# Patient Record
Sex: MALE | Race: WHITE | NOT HISPANIC OR LATINO | Employment: OTHER | URBAN - METROPOLITAN AREA
[De-identification: names, ages, dates, MRNs, and addresses within clinical notes are randomized per-mention and may not be internally consistent; named-entity substitution may affect disease eponyms.]

---

## 2017-03-15 ENCOUNTER — TELEPHONE (OUTPATIENT)
Dept: MEDICAL GROUP | Age: 82
End: 2017-03-15

## 2017-03-15 NOTE — TELEPHONE ENCOUNTER
Called Lui Denise and left a message to return my call regarding his/her upcoming NP appointment with Giuseppe Lazo M.D..   If patient returns the call please transfer them to extension: [phone]

## 2017-03-16 RX ORDER — ESCITALOPRAM OXALATE 20 MG/1
20 TABLET ORAL DAILY
COMMUNITY
End: 2017-03-22 | Stop reason: SDUPTHER

## 2017-03-16 NOTE — TELEPHONE ENCOUNTER
NEW PATIENT PRE-VISIT PLANNING    Called Lui Denise in order to verify health topics prior to the New appointment.     1.  All medications were updated? yes    2.  Allergies were updated? yes    3.  All care teams were updated? yes       •   Gait devices, O2, CPAP, etc: N\A        •   Eye professional: yes       •   Other specialists (GYN, cardiology, endo, etc): yes    4.  All pharmacies were updated? yes          Current Outpatient Prescriptions   Medication Sig Dispense Refill   • escitalopram (LEXAPRO) 20 MG tablet Take 20 mg by mouth every day.     • Donepezil HCl (ARICEPT PO) Take  by mouth.       No current facility-administered medications for this visit.       5.  Patient may be due for these Health Maintenance Topics (update any if possible):            Health Maintenance Due   Topic Date Due   • IMM DTaP/Tdap/Td Vaccine (1 - Tdap) 03/22/1955   • COLONOSCOPY  03/22/1986   • IMM ZOSTER VACCINE  03/22/1996   • IMM PNEUMOCOCCAL 65+ (ADULT) LOW/MEDIUM RISK SERIES (1 of 2 - PCV13) 03/22/2001   • IMM INFLUENZA (1) 09/01/2016                        6.  Immunizations were updated in [facility] using WebIZ?: No        a. Web Iz Recommendations: Not in Web Iz          7.  Former PCP records requested?: N\A         8.  Notes to provider or MA:       a. Establish care               Pt was encouraged to keep the appointment and to arrive at least 15-20 minutes early.

## 2017-03-22 ENCOUNTER — OFFICE VISIT (OUTPATIENT)
Dept: MEDICAL GROUP | Age: 82
End: 2017-03-22
Payer: MEDICARE

## 2017-03-22 VITALS
HEIGHT: 62 IN | WEIGHT: 143 LBS | RESPIRATION RATE: 16 BRPM | OXYGEN SATURATION: 98 % | SYSTOLIC BLOOD PRESSURE: 136 MMHG | DIASTOLIC BLOOD PRESSURE: 88 MMHG | TEMPERATURE: 97.9 F | HEART RATE: 66 BPM | BODY MASS INDEX: 26.31 KG/M2

## 2017-03-22 DIAGNOSIS — Z00.00 PREVENTATIVE HEALTH CARE: ICD-10-CM

## 2017-03-22 DIAGNOSIS — G30.1 LATE ONSET ALZHEIMER'S DISEASE WITH BEHAVIORAL DISTURBANCE (HCC): ICD-10-CM

## 2017-03-22 DIAGNOSIS — F32.9 REACTIVE DEPRESSION: ICD-10-CM

## 2017-03-22 DIAGNOSIS — Z23 NEED FOR VACCINATION: ICD-10-CM

## 2017-03-22 DIAGNOSIS — F02.818 LATE ONSET ALZHEIMER'S DISEASE WITH BEHAVIORAL DISTURBANCE (HCC): ICD-10-CM

## 2017-03-22 DIAGNOSIS — Z86.73 H/O: CVA (CEREBROVASCULAR ACCIDENT): ICD-10-CM

## 2017-03-22 DIAGNOSIS — K55.069: ICD-10-CM

## 2017-03-22 PROCEDURE — 99204 OFFICE O/P NEW MOD 45 MIN: CPT | Performed by: FAMILY MEDICINE

## 2017-03-22 RX ORDER — DONEPEZIL HYDROCHLORIDE 10 MG/1
10 TABLET, FILM COATED ORAL DAILY
Qty: 90 TAB | Refills: 1 | Status: SHIPPED | OUTPATIENT
Start: 2017-03-22 | End: 2018-01-15

## 2017-03-22 RX ORDER — ESCITALOPRAM OXALATE 20 MG/1
20 TABLET ORAL DAILY
Qty: 90 TAB | Refills: 1 | Status: SHIPPED | OUTPATIENT
Start: 2017-03-22 | End: 2017-09-23 | Stop reason: SDUPTHER

## 2017-03-22 RX ORDER — MEMANTINE HYDROCHLORIDE 10 MG/1
10 TABLET ORAL 2 TIMES DAILY
Qty: 180 TAB | Refills: 0 | Status: SHIPPED | OUTPATIENT
Start: 2017-03-22 | End: 2017-08-21 | Stop reason: SDUPTHER

## 2017-03-22 RX ORDER — DONEPEZIL HYDROCHLORIDE 10 MG/1
10 TABLET, FILM COATED ORAL NIGHTLY
COMMUNITY
End: 2017-03-22 | Stop reason: SDUPTHER

## 2017-03-22 RX ORDER — MELOXICAM 7.5 MG/1
7.5 TABLET ORAL DAILY
COMMUNITY
End: 2018-01-15

## 2017-03-22 RX ORDER — ASPIRIN 81 MG/1
81 TABLET, CHEWABLE ORAL DAILY
COMMUNITY
End: 2018-01-15

## 2017-03-22 RX ORDER — MEMANTINE HYDROCHLORIDE 10 MG/1
10 TABLET ORAL 2 TIMES DAILY
COMMUNITY
End: 2017-03-22 | Stop reason: SDUPTHER

## 2017-03-22 ASSESSMENT — PATIENT HEALTH QUESTIONNAIRE - PHQ9: CLINICAL INTERPRETATION OF PHQ2 SCORE: 1

## 2017-03-22 NOTE — ASSESSMENT & PLAN NOTE
On Namenda and Aricept ×6 years  This started in May 2011 after his bowel infarct per wife  Is often irritated about being asked what he wants to eat  He has no recolection of his unusual behavior  His wife is very supportive  Does not have a neurologist  They do not want to be seen by any specialists due to fear of co-pays

## 2017-03-22 NOTE — ASSESSMENT & PLAN NOTE
About 44 years ago--suffered a stroke per wife  At age 40, he was a heavy drinker at that point  They are not certain if that was the cause  No residual weakness, no speech difficulties

## 2017-03-22 NOTE — ASSESSMENT & PLAN NOTE
The patient is a very pleasant 82-year-old male who presents to clinic with his wife to establish care. He has a significant past medical history of CVA?, Depression, late onset Alzheimer's disease, bowel infarction? The patient denied any chest pain, no sob, no lazar, no  pnd, no orthopnea, no headache, no changes in vision, no numbness or tingling, no nausea, no diarrhea, no abdominal pain, no fevers, no chills, no bright red blood per rectum, no  difficulty urinating, no burning during micturition, no depressed mood, no other concerns.    He can walk with a cane  [place] [employer]  He can walk 5 blocks with some difficulty  He walks rarely , he is not active  His wife is his sole caretaker  His son lives in [place]  There are certain that they're up-to-date on all vaccinations

## 2017-03-22 NOTE — MR AVS SNAPSHOT
"        Lui Denise   3/22/2017 10:40 AM   Office Visit   MRN: [id number]    Department:  [address] [place] [address]   Dept Phone:  [phone]    Description:  Male : 1935   Provider:  Giuseppe Lazo M.D.           Allergies as of 3/22/2017     No Known Allergies      You were diagnosed with     Bowel infarction (HCC)   [705092]       Late onset Alzheimer's disease with behavioral disturbance   [8996385]       Need for vaccination   [620852]       Reactive depression   [621399]       H/O: CVA (cerebrovascular accident)   [981730]       Preventative health care   [310232]         Vital Signs     Blood Pressure Pulse Temperature Respirations Height Weight    136/88 mmHg 66 36.6 °C (97.9 °F) 16 1.575 m (5' 2.01\") 64.864 kg (143 lb)    Body Mass Index Oxygen Saturation Smoking Status             26.15 kg/m2 98% Former Smoker         Basic Information     Date Of Birth Sex Preferred Language          1935 Male English        Your appointments     Mar 22, 2017 10:40 AM   New Patient with Giuseppe Lazo M.D.   [address])    [address] [place] [address] [facility] [address]   [phone]           Please bring Photo ID, Insurance Cards, All Medication Bottles and copies of any legal documents (such as Living Will, Power of ) If speaking a language besides English please bring an adult . Please arrive 30 minutes prior for check in and registration. You will be receiving a confirmation call a few days before your appointment from our automated call confirmation system.            2017  1:40 PM   Established Patient with Giuseppe Lazo M.D.   [address])    [address] [place] [address] [facility] [address]   [phone]           You will be receiving a confirmation call a few days before your appointment from our automated call confirmation system.              Problem List              ICD-10-CM Priority Class " Noted - Resolved    Bowel infarction (HCC) K55.069   3/22/2017 - Present    Late onset Alzheimer's disease with behavioral disturbance G30.1, F02.81   3/22/2017 - Present    Need for vaccination Z23   3/22/2017 - Present    Reactive depression F32.9   3/22/2017 - Present    H/O: CVA (cerebrovascular accident) Z86.73   3/22/2017 - Present    Preventative health care Z00.00   3/22/2017 - Present      Health Maintenance        Date Due Completion Dates    IMM DTaP/Tdap/Td Vaccine (1 - Tdap) 3/22/1955 ---    COLONOSCOPY 3/22/1986 ---    IMM ZOSTER VACCINE 3/22/1996 ---    IMM PNEUMOCOCCAL 65+ (ADULT) LOW/MEDIUM RISK SERIES (1 of 2 - PCV13) 3/22/2001 ---    IMM INFLUENZA (1) 9/1/2016 ---            Current Immunizations     No immunizations on file.      Below and/or attached are the medications your provider expects you to take. Review all of your home medications and newly ordered medications with your provider and/or pharmacist. Follow medication instructions as directed by your provider and/or pharmacist. Please keep your medication list with you and share with your provider. Update the information when medications are discontinued, doses are changed, or new medications (including over-the-counter products) are added; and carry medication information at all times in the event of emergency situations     Allergies:  No Known Allergies          Medications  Valid as of: March 22, 2017 -  9:57 AM    Generic Name Brand Name Tablet Size Instructions for use    Aspirin (Chew Tab) ASA 81 MG Take 81 mg by mouth every day.        Donepezil HCl   Take  by mouth.        Donepezil HCl (Tab) ARICEPT 10 MG Take 1 Tab by mouth every day.        Escitalopram Oxalate (Tab) LEXAPRO 20 MG Take 1 Tab by mouth every day.        Meloxicam (Tab) MOBIC 7.5 MG Take 7.5 mg by mouth every day.        Memantine HCl (Tab) NAMENDA 10 MG Take 1 Tab by mouth 2 times a day.        .                 Medicines prescribed today were sent to:     [id number] - [place], [place] - [address]    [address] [place] [address]    Phone: [phone] Fax: [phone]    Open 24 Hours?: No      Medication refill instructions:       If your prescription bottle indicates you have medication refills left, it is not necessary to call your provider’s office. Please contact your pharmacy and they will refill your medication.    If your prescription bottle indicates you do not have any refills left, you may request refills at any time through one of the following ways: The online Comeks system (except Urgent Care), by calling your provider’s office, or by asking your pharmacy to contact your provider’s office with a refill request. Medication refills are processed only during regular business hours and may not be available until the next business day. Your provider may request additional information or to have a follow-up visit with you prior to refilling your medication.   *Please Note: Medication refills are assigned a new Rx number when refilled electronically. Your pharmacy may indicate that no refills were authorized even though a new prescription for the same medication is available at the pharmacy. Please request the medicine by name with the pharmacy before contacting your provider for a refill.        Your To Do List     Future Labs/Procedures Complete By Expires    CBC WITH DIFFERENTIAL  As directed 3/22/2018    COMP METABOLIC PANEL  As directed 3/22/2018    LIPID PROFILE  As directed 3/22/2018         Comeks Status: Patient Declined

## 2017-03-22 NOTE — PROGRESS NOTES
This medical record contains text that has been entered with the assistance of computer voice recognition and dictation software.  Therefore, it may contain unintended errors in text, spelling, punctuation, or grammar    No chief complaint on file.      Lui Denise is a 82 y.o. male here evaluation and management of: Establish care, also has dementia, depression      HPI:     Bowel infarction (HCC)  May 29, 2011  Was in ICU for 2wks  S/p colon resection  Now eating in normal fashion  And has BM x1 daily    Late onset Alzheimer's disease with behavioral disturbance  On Namenda and Aricept ×6 years  This started in May 2011 after his bowel infarct per wife  Is often irritated about being asked what he wants to eat  He has no recolection of his unusual behavior  His wife is very supportive  Does not have a neurologist  They do not want to be seen by any specialists due to fear of co-pays    Reactive depression  Lexapro 20 mg daily Was started to help with the agitation from dementia. Wife states that she thinks it may be helping she is not certain.    H/O: CVA (cerebrovascular accident)  About 44 years ago--suffered a stroke per wife  At age 40, he was a heavy drinker at that point  They are not certain if that was the cause  No residual weakness, no speech difficulties    Need for vaccination  They do not believe in vaccinations    Preventative health care  The patient is a very pleasant 82-year-old male who presents to clinic with his wife to establish care. He has a significant past medical history of CVA?, Depression, late onset Alzheimer's disease, bowel infarction? The patient denied any chest pain, no sob, no lazar, no  pnd, no orthopnea, no headache, no changes in vision, no numbness or tingling, no nausea, no diarrhea, no abdominal pain, no fevers, no chills, no bright red blood per rectum, no  difficulty urinating, no burning during micturition, no depressed mood, no other concerns.    He can walk with a  cane  [place]  He can walk 5 blocks with some difficulty  He walks rarely , he is not active  His wife is his sole caretaker  His son lives in [place]  There are certain that they're up-to-date on all vaccinations        Current medicines (including changes today)  Current Outpatient Prescriptions   Medication Sig Dispense Refill   • meloxicam (MOBIC) 7.5 MG Tab Take 7.5 mg by mouth every day.     • aspirin (ASA) 81 MG Chew Tab chewable tablet Take 81 mg by mouth every day.     • escitalopram (LEXAPRO) 20 MG tablet Take 1 Tab by mouth every day. 90 Tab 1   • memantine (NAMENDA) 10 MG Tab Take 1 Tab by mouth 2 times a day. 180 Tab 0   • donepezil (ARICEPT) 10 MG tablet Take 1 Tab by mouth every day. 90 Tab 1   • Donepezil HCl (ARICEPT PO) Take  by mouth.       No current facility-administered medications for this visit.     He  has a past medical history of Stroke (CMS-[facility]) and Alzheimer's dementia.  He  has past surgical history that includes hernia repair.  Social History   Substance Use Topics   • Smoking status: Former Smoker -- 3.00 packs/day for 48 years     Types: Cigarettes     Quit date: 1996   • Smokeless tobacco: Former User     Types: Chew     Quit date: 1968   • Alcohol Use: No      Comment: recoverying alcoholic  1973     Social History     Social History Narrative     Family History   Problem Relation Age of Onset   • No Known Problems Mother    • No Known Problems Father    • No Known Problems Maternal Grandmother    • No Known Problems Maternal Grandfather    • No Known Problems Paternal Grandmother    • No Known Problems Paternal Grandfather      Family Status   Relation Status Death Age   • Mother     • Father     • Sister Alive    • Maternal Grandmother     • Maternal Grandfather     • Paternal Grandmother     • Paternal Grandfather           ROS  Please see history of present illness    All other  "systems reviewed and are negative     Objective:     Blood pressure 136/88, pulse 66, temperature 36.6 °C (97.9 °F), resp. rate 16, height 1.575 m (5' 2.01\"), weight 64.864 kg (143 lb), SpO2 98 %. Body mass index is 26.15 kg/(m^2).  Physical Exam:    Constitutional: Alert, no distress, walks with cane very slowly  Skin: Warm, dry, good turgor, no rashes in visible areas.  Eye: Equal, round and reactive, conjunctiva clear, lids normal.  ENMT: Lips without lesions, good dentition, oropharynx clear.  Neck: Trachea midline, no masses, no thyromegaly. No cervical or supraclavicular lymphadenopathy.  Respiratory: Unlabored respiratory effort, lungs clear to auscultation, no wheezes, no ronchi.  Cardiovascular: Normal S1, S2, no murmur, no edema.  Abdomen: Soft, non-tender, no masses, no hepatosplenomegaly.  Psych: Alert and oriented x3, normal affect and mood.          Assessment and Plan:   The following treatment plan was discussed, again this medical record contains text that has been entered with the assistance of computer voice recognition and dictation software.  Therefore, it may contain unintended errors in text, spelling, punctuation, or grammar      1. Bowel infarction (HCC)  Requested that records be sent to us    2. Late onset Alzheimer's disease with behavioral disturbance  I will refill his medications  They do not want to be seen by any other specialist  Because of fear of co-pays  - memantine (NAMENDA) 10 MG Tab; Take 1 Tab by mouth 2 times a day.  Dispense: 180 Tab; Refill: 0  - donepezil (ARICEPT) 10 MG tablet; Take 1 Tab by mouth every day.  Dispense: 90 Tab; Refill: 1    3. Need for vaccination  There are certain that they're up-to-date on all vaccinations  I requested that they have records sent here    4. Reactive depression  Patient has been stable with current management  We will make no changes for now  - escitalopram (LEXAPRO) 20 MG tablet; Take 1 Tab by mouth every day.  Dispense: 90 Tab; " Refill: 1    5. H/O: CVA (cerebrovascular accident)  Continue blood pressure control and daily prophylactic aspirin    6. Preventative health care  Care has been established  We need baseline labs to establish a clinical profile  Continue daily prophylactic aspirin    the benefits outweighs  risk of GI bleed in his case  We reviewed [place] preventative guidelines  Up to date on colonoscopy per wife  Requested Medical records to be sent to us  This patient is due for nothing per wife    - COMP METABOLIC PANEL; Future  - CBC WITH DIFFERENTIAL; Future  - LIPID PROFILE; Future        Followup: Return in about 3 months (around 6/22/2017) for Reevaluation, shave biopsy.

## 2017-03-22 NOTE — ASSESSMENT & PLAN NOTE
Lexapro 20 mg daily Was started to help with the agitation from dementia. Wife states that she thinks it may be helping she is not certain.

## 2017-03-22 NOTE — ASSESSMENT & PLAN NOTE
May 29, 2011  Was in ICU for 2wks  S/p colon resection  Now eating in normal fashion  And has BM x1 daily

## 2017-04-19 ENCOUNTER — HOSPITAL ENCOUNTER (OUTPATIENT)
Dept: LAB | Facility: MEDICAL CENTER | Age: 82
End: 2017-04-19
Attending: FAMILY MEDICINE
Payer: MEDICARE

## 2017-04-19 DIAGNOSIS — Z00.00 PREVENTATIVE HEALTH CARE: ICD-10-CM

## 2017-04-19 LAB
BASOPHILS # BLD AUTO: 1.1 % (ref 0–1.8)
BASOPHILS # BLD: 0.08 K/UL (ref 0–0.12)
EOSINOPHIL # BLD AUTO: 0.22 K/UL (ref 0–0.51)
EOSINOPHIL NFR BLD: 2.9 % (ref 0–6.9)
ERYTHROCYTE [DISTWIDTH] IN BLOOD BY AUTOMATED COUNT: 48.4 FL (ref 35.9–50)
HCT VFR BLD AUTO: 47.9 % (ref 42–52)
HGB BLD-MCNC: 15.5 G/DL (ref 14–18)
IMM GRANULOCYTES # BLD AUTO: 0.07 K/UL (ref 0–0.11)
IMM GRANULOCYTES NFR BLD AUTO: 0.9 % (ref 0–0.9)
LYMPHOCYTES # BLD AUTO: 2.21 K/UL (ref 1–4.8)
LYMPHOCYTES NFR BLD: 29.2 % (ref 22–41)
MCH RBC QN AUTO: 32.4 PG (ref 27–33)
MCHC RBC AUTO-ENTMCNC: 32.4 G/DL (ref 33.7–35.3)
MCV RBC AUTO: 100.2 FL (ref 81.4–97.8)
MONOCYTES # BLD AUTO: 0.44 K/UL (ref 0–0.85)
MONOCYTES NFR BLD AUTO: 5.8 % (ref 0–13.4)
NEUTROPHILS # BLD AUTO: 4.56 K/UL (ref 1.82–7.42)
NEUTROPHILS NFR BLD: 60.1 % (ref 44–72)
NRBC # BLD AUTO: 0 K/UL
NRBC BLD AUTO-RTO: 0 /100 WBC
PLATELET # BLD AUTO: 213 K/UL (ref 164–446)
PMV BLD AUTO: 11 FL (ref 9–12.9)
RBC # BLD AUTO: 4.78 M/UL (ref 4.7–6.1)
WBC # BLD AUTO: 7.6 K/UL (ref 4.8–10.8)

## 2017-04-19 PROCEDURE — 80061 LIPID PANEL: CPT

## 2017-04-19 PROCEDURE — 80053 COMPREHEN METABOLIC PANEL: CPT

## 2017-04-19 PROCEDURE — 85025 COMPLETE CBC W/AUTO DIFF WBC: CPT

## 2017-04-19 PROCEDURE — 36415 COLL VENOUS BLD VENIPUNCTURE: CPT

## 2017-04-20 LAB
ALBUMIN SERPL BCP-MCNC: 4.2 G/DL (ref 3.2–4.9)
ALBUMIN/GLOB SERPL: 1.5 G/DL
ALP SERPL-CCNC: 70 U/L (ref 30–99)
ALT SERPL-CCNC: 11 U/L (ref 2–50)
ANION GAP SERPL CALC-SCNC: 8 MMOL/L (ref 0–11.9)
AST SERPL-CCNC: 14 U/L (ref 12–45)
BILIRUB SERPL-MCNC: 1.5 MG/DL (ref 0.1–1.5)
BUN SERPL-MCNC: 24 MG/DL (ref 8–22)
CALCIUM SERPL-MCNC: 9.2 MG/DL (ref 8.5–10.5)
CHLORIDE SERPL-SCNC: 107 MMOL/L (ref 96–112)
CHOLEST SERPL-MCNC: 150 MG/DL (ref 100–199)
CO2 SERPL-SCNC: 25 MMOL/L (ref 20–33)
CREAT SERPL-MCNC: 1.07 MG/DL (ref 0.5–1.4)
GFR SERPL CREATININE-BSD FRML MDRD: >60 ML/MIN/1.73 M 2
GLOBULIN SER CALC-MCNC: 2.8 G/DL (ref 1.9–3.5)
GLUCOSE SERPL-MCNC: 84 MG/DL (ref 65–99)
HDLC SERPL-MCNC: 39 MG/DL
LDLC SERPL CALC-MCNC: 94 MG/DL
POTASSIUM SERPL-SCNC: 3.7 MMOL/L (ref 3.6–5.5)
PROT SERPL-MCNC: 7 G/DL (ref 6–8.2)
SODIUM SERPL-SCNC: 140 MMOL/L (ref 135–145)
TRIGL SERPL-MCNC: 86 MG/DL (ref 0–149)

## 2017-04-21 ENCOUNTER — TELEPHONE (OUTPATIENT)
Dept: MEDICAL GROUP | Age: 82
End: 2017-04-21

## 2017-04-21 NOTE — TELEPHONE ENCOUNTER
----- Message from Giuseppe Lazo M.D. sent at 4/20/2017  3:16 PM PDT -----  All of the patients labs were normal. They may see some that are highlighted, however these have no clinical significance.    Mando Lazo MD  [facility]  [address] [place] [id number]

## 2017-04-21 NOTE — TELEPHONE ENCOUNTER
Phone Number Called: [phone]    Message: Spoke with pt's wife, informed her of results of lab work.    Left Message for patient to call back: N\A

## 2017-06-22 ENCOUNTER — HOSPITAL ENCOUNTER (OUTPATIENT)
Facility: MEDICAL CENTER | Age: 82
End: 2017-06-22
Attending: FAMILY MEDICINE
Payer: MEDICARE

## 2017-06-22 ENCOUNTER — OFFICE VISIT (OUTPATIENT)
Dept: MEDICAL GROUP | Age: 82
End: 2017-06-22
Payer: MEDICARE

## 2017-06-22 VITALS
TEMPERATURE: 97.5 F | HEART RATE: 105 BPM | BODY MASS INDEX: 25.65 KG/M2 | WEIGHT: 139.4 LBS | OXYGEN SATURATION: 98 % | HEIGHT: 62 IN | DIASTOLIC BLOOD PRESSURE: 62 MMHG | SYSTOLIC BLOOD PRESSURE: 110 MMHG

## 2017-06-22 DIAGNOSIS — D48.9 NEOPLASM OF UNCERTAIN BEHAVIOR: ICD-10-CM

## 2017-06-22 DIAGNOSIS — Z23 NEED FOR VACCINATION: ICD-10-CM

## 2017-06-22 DIAGNOSIS — L57.0 AK (ACTINIC KERATOSIS): ICD-10-CM

## 2017-06-22 PROCEDURE — 90472 IMMUNIZATION ADMIN EACH ADD: CPT | Performed by: FAMILY MEDICINE

## 2017-06-22 PROCEDURE — 17000 DESTRUCT PREMALG LESION: CPT | Performed by: FAMILY MEDICINE

## 2017-06-22 PROCEDURE — 11100 PR BIOPSY OF SKIN LESION: CPT | Mod: 59 | Performed by: FAMILY MEDICINE

## 2017-06-22 PROCEDURE — 17003 DESTRUCT PREMALG LES 2-14: CPT | Performed by: FAMILY MEDICINE

## 2017-06-22 PROCEDURE — G0009 ADMIN PNEUMOCOCCAL VACCINE: HCPCS | Mod: 59 | Performed by: FAMILY MEDICINE

## 2017-06-22 PROCEDURE — 90670 PCV13 VACCINE IM: CPT | Performed by: FAMILY MEDICINE

## 2017-06-22 PROCEDURE — 88305 TISSUE EXAM BY PATHOLOGIST: CPT

## 2017-06-22 PROCEDURE — 90715 TDAP VACCINE 7 YRS/> IM: CPT | Performed by: FAMILY MEDICINE

## 2017-06-22 NOTE — ASSESSMENT & PLAN NOTE
The patient is an 82-year-old male who presents to clinic with his wife and his daughter with a chief complaint of mole on the left upper back. They state that is been there for over 20 years but may have grown recently. He has several moles all over his body they state and need to be seen by dermatologist.

## 2017-06-22 NOTE — PROGRESS NOTES
This medical record contains text that has been entered with the assistance of computer voice recognition and dictation software.  Therefore, it may contain unintended errors in text, spelling, punctuation, or grammar    Chief Complaint   Patient presents with   • Other     see reason for visit       Lui Denise is a 82 y.o. male here evaluation and management of: Moles, AK      HPI:     Neoplasm of uncertain behavior  The patient is an 82-year-old male who presents to clinic with his wife and his daughter with a chief complaint of mole on the left upper back. They state that is been there for over 20 years but may have grown recently. He has several moles all over his body they state and need to be seen by dermatologist.    AK (actinic keratosis)  Patient also noted some angiopathic, red pinpoint, dry spots on his back. These are new.      Current medicines (including changes today)  Current Outpatient Prescriptions   Medication Sig Dispense Refill   • escitalopram (LEXAPRO) 20 MG tablet Take 1 Tab by mouth every day. 90 Tab 1   • memantine (NAMENDA) 10 MG Tab Take 1 Tab by mouth 2 times a day. 180 Tab 0   • donepezil (ARICEPT) 10 MG tablet Take 1 Tab by mouth every day. 90 Tab 1   • Donepezil HCl (ARICEPT PO) Take  by mouth.     • meloxicam (MOBIC) 7.5 MG Tab Take 7.5 mg by mouth every day.     • aspirin (ASA) 81 MG Chew Tab chewable tablet Take 81 mg by mouth every day.       No current facility-administered medications for this visit.     He  has a past medical history of Stroke (CMS-[facility]) and Alzheimer's dementia.  He  has past surgical history that includes hernia repair.  Social History   Substance Use Topics   • Smoking status: Former Smoker -- 3.00 packs/day for 48 years     Types: Cigarettes     Quit date: 03/19/1996   • Smokeless tobacco: Former User     Types: Chew     Quit date: 03/19/1968   • Alcohol Use: No      Comment: recoverying alcoholic  july 6, 1973     Social History     Social History  "Narrative     Family History   Problem Relation Age of Onset   • No Known Problems Mother    • No Known Problems Father    • No Known Problems Maternal Grandmother    • No Known Problems Maternal Grandfather    • No Known Problems Paternal Grandmother    • No Known Problems Paternal Grandfather      Family Status   Relation Status Death Age   • Mother     • Father     • Sister Alive    • Maternal Grandmother     • Maternal Grandfather     • Paternal Grandmother     • Paternal Grandfather           ROS  Please see hpi    All other systems reviewed and are negative     Objective:     Blood pressure 110/62, pulse 105, temperature 36.4 °C (97.5 °F), height 1.575 m (5' 2.01\"), weight 63.231 kg (139 lb 6.4 oz), SpO2 98 %. Body mass index is 25.49 kg/(m^2).  Physical Exam:    Constitutional: Alert, no distress.  Skin: Warm, dry, good turgor, no rashes in visible areas.  2  lesions on left upper back evidence of of solar damage present , spotty scattered telangiectasias, and  Xerosis      PROCEDURE: CRYOTHERAPY  Discussed risks and benefits of cryotherapy. Patient verbally agreed. 3 applications of cryotherapy were applied to the back lesions. Patient tolerated procedure well. Aftercare instructions given.      Eye: Equal, round and reactive, conjunctiva clear, lids normal.  ENMT: Lips without lesions, good dentition, oropharynx clear.  Neck: Trachea midline, no masses, no thyromegaly. No cervical or supraclavicular lymphadenopathy.  Respiratory: Unlabored respiratory effort, lungs clear to auscultation, no wheezes, no ronchi.  Cardiovascular: Normal S1, S2, no murmur, no edema.  Abdomen: Soft, non-tender, no masses, no hepatosplenomegaly.  Psych: Alert and oriented x3, normal affect and mood.    PROCEDURE NOTE, SHAVE BIOPSY    Indication: pain/bleeding over skin growth      History of allergy to iodine: NO     The risks (including bleeding and infection) and benefits of the " procedure and written informed consent obtained.    Local anesthesia was performed with Lidocaine 1% without epinephrine, prepped with povidone iodine, and draped in the usual sterile fashion.  Using a dermablade i shaved beneath the leasion and removed.     The wound stopped bleeding and a sterile dressing applied.  The specimen was  sent for pathologic examination.    The patient tolerated the procedure well and without apparent complications.    The patient was instructed to keep the wound dry and covered for 24-48h and clean thereafter, and warning signs of infection were reviewed.    Recommended that the patient use OTC analgesics as needed for pain.  Followup sooner for any concerns.            Assessment and Plan:   The following treatment plan was discussed    1. Neoplasm of uncertain behavior  Patient tollerrated procedure well  There were no adverse events  Patient was given post procedure precautions     - PATHOLOGY SPECIMEN; Future  - REFERRAL TO DERMATOLOGY    2. Need for vaccination  Given today    - Pneumococcal Conjugate Vaccine 13-Valent <4yo IM  - TDAP VACCINE =>6YO IM      3. AK (actinic keratosis)  Patient tollerrated procedure well  There were no adverse events  Patient was given post procedure precautions         HEALTH MAINTENANCE: due for AWV and shingles    Instructed to Follow up in clinic or ER for worsening symptoms, difficulty breathing, lack of expected recovery, or should new symptoms or problems arise.    Followup: Return in about 3 months (around 9/22/2017) for Reevaluation.       Once again this medical record contains text that has been entered with the assistance of computer voice recognition and dictation software.  Therefore, it may contain unintended errors in text, spelling, punctuation, or grammar

## 2017-06-23 LAB — PATHOLOGY CONSULT NOTE: NORMAL

## 2017-06-27 DIAGNOSIS — D48.9 NEOPLASM OF UNCERTAIN BEHAVIOR: ICD-10-CM

## 2017-08-22 RX ORDER — MEMANTINE HYDROCHLORIDE 10 MG/1
TABLET ORAL
Qty: 180 TAB | Refills: 0 | Status: SHIPPED | OUTPATIENT
Start: 2017-08-22 | End: 2017-11-21 | Stop reason: SDUPTHER

## 2017-09-23 DIAGNOSIS — F32.9 REACTIVE DEPRESSION: ICD-10-CM

## 2017-09-26 RX ORDER — ESCITALOPRAM OXALATE 20 MG/1
TABLET ORAL
Qty: 90 TAB | Refills: 0 | Status: SHIPPED | OUTPATIENT
Start: 2017-09-26 | End: 2017-12-20 | Stop reason: SDUPTHER

## 2017-11-28 RX ORDER — MEMANTINE HYDROCHLORIDE 10 MG/1
TABLET ORAL
Qty: 180 TAB | Refills: 0 | Status: ON HOLD | OUTPATIENT
Start: 2017-11-28 | End: 2018-02-13

## 2017-12-20 DIAGNOSIS — F32.9 REACTIVE DEPRESSION: ICD-10-CM

## 2017-12-26 RX ORDER — ESCITALOPRAM OXALATE 20 MG/1
TABLET ORAL
Qty: 90 TAB | Refills: 0 | Status: SHIPPED | OUTPATIENT
Start: 2017-12-26 | End: 2018-02-27

## 2017-12-26 RX ORDER — DONEPEZIL HYDROCHLORIDE 10 MG/1
10 TABLET, FILM COATED ORAL EVERY 24 HOURS
Qty: 90 TAB | Refills: 0 | Status: SHIPPED | OUTPATIENT
Start: 2017-12-26 | End: 2018-02-27

## 2017-12-27 ENCOUNTER — TELEPHONE (OUTPATIENT)
Dept: MEDICAL GROUP | Age: 82
End: 2017-12-27

## 2017-12-27 NOTE — TELEPHONE ENCOUNTER
ESTABLISHED PATIENT PRE-VISIT PLANNING     Note: Patient will not be contacted if there is no indication to call.     1.  Reviewed notes from the last few office visits within the medical group: Yes    2.  If any orders were placed at last visit or intended to be done for this visit (i.e. 6 mos follow-up), do we have Results/Consult Notes?        •  Labs - Labs were not ordered at last office visit.   Note: If patient appointment is for lab review and patient did not complete labs, check with provider if OK to reschedule patient until labs completed.       •  Imaging - Imaging was not ordered at last office visit.       •  Referrals - No referrals were ordered at last office visit.    3. Is this appointment scheduled as a Hospital Follow-Up? No    4.  Immunizations were updated in Epic using WebIZ?: Epic matches WebIZ       •  Web Iz Recommendations: FLU and ZOSTAVAX (Shingles)    5.  Patient is due for the following Health Maintenance Topics:   Health Maintenance Due   Topic Date Due   • Annual Wellness Visit  1935   • IMM ZOSTER VACCINE  03/22/1996   • IMM INFLUENZA (1) 09/01/2017       - Patient is up-to-date on all Health Maintenance topics. No records have been requested at this time.    6.  Patient was NOT informed to arrive 15 min prior to their scheduled appointment and bring in their medication bottles.

## 2017-12-28 ENCOUNTER — OFFICE VISIT (OUTPATIENT)
Dept: MEDICAL GROUP | Age: 82
End: 2017-12-28
Payer: MEDICARE

## 2017-12-28 VITALS
OXYGEN SATURATION: 98 % | WEIGHT: 138 LBS | BODY MASS INDEX: 25.4 KG/M2 | HEART RATE: 84 BPM | DIASTOLIC BLOOD PRESSURE: 70 MMHG | SYSTOLIC BLOOD PRESSURE: 124 MMHG | TEMPERATURE: 97.7 F | HEIGHT: 62 IN

## 2017-12-28 DIAGNOSIS — F32.9 REACTIVE DEPRESSION: ICD-10-CM

## 2017-12-28 DIAGNOSIS — H25.012 CORTICAL AGE-RELATED CATARACT OF LEFT EYE: ICD-10-CM

## 2017-12-28 DIAGNOSIS — Z23 NEED FOR VACCINATION: ICD-10-CM

## 2017-12-28 DIAGNOSIS — F02.818 LATE ONSET ALZHEIMER'S DISEASE WITH BEHAVIORAL DISTURBANCE (HCC): ICD-10-CM

## 2017-12-28 DIAGNOSIS — J30.1 CHRONIC SEASONAL ALLERGIC RHINITIS DUE TO POLLEN: ICD-10-CM

## 2017-12-28 DIAGNOSIS — G30.1 LATE ONSET ALZHEIMER'S DISEASE WITH BEHAVIORAL DISTURBANCE (HCC): ICD-10-CM

## 2017-12-28 DIAGNOSIS — R06.83 SNORING: ICD-10-CM

## 2017-12-28 DIAGNOSIS — L57.0 AK (ACTINIC KERATOSIS): ICD-10-CM

## 2017-12-28 PROBLEM — H25.9 AGE-RELATED CATARACT OF LEFT EYE: Status: ACTIVE | Noted: 2017-12-28

## 2017-12-28 PROCEDURE — 17003 DESTRUCT PREMALG LES 2-14: CPT | Performed by: FAMILY MEDICINE

## 2017-12-28 PROCEDURE — 99214 OFFICE O/P EST MOD 30 MIN: CPT | Mod: 25 | Performed by: FAMILY MEDICINE

## 2017-12-28 PROCEDURE — 17000 DESTRUCT PREMALG LESION: CPT | Performed by: FAMILY MEDICINE

## 2017-12-28 RX ORDER — MONTELUKAST SODIUM 10 MG/1
10 TABLET ORAL DAILY
Qty: 90 TAB | Refills: 1 | Status: SHIPPED | OUTPATIENT
Start: 2017-12-28 | End: 2018-02-27

## 2017-12-28 RX ORDER — ALPRAZOLAM 0.25 MG/1
0.25 TABLET ORAL NIGHTLY PRN
Qty: 30 TAB | Refills: 0 | Status: SHIPPED | OUTPATIENT
Start: 2017-12-28 | End: 2018-01-27

## 2017-12-28 NOTE — ASSESSMENT & PLAN NOTE
Lexapro 20mg    She has not noticed any improvement, he continues to have bouts of anxiety nervousness when he becomes really irritable and frustrated. This is due to him not remembering routine things throughout his day.

## 2017-12-28 NOTE — PROGRESS NOTES
This medical record contains text that has been entered with the assistance of computer voice recognition and dictation software.  Therefore, it may contain unintended errors in text, spelling, punctuation, or grammar    Chief Complaint   Patient presents with   • Other     see reason for visit       Lui Denise is a 82 y.o. male here evaluation and management of: Alzheimers, depression, AK, cataract      HPI:     Late onset Alzheimer's disease with behavioral disturbance  On Namenda and Aricept x 7 years  They believe his memory is improving  He remembers his wife or murmurs his daughter  He continues to have some behavioral disturbances when he becomes very irritable, they think he may benefit from Xanax.      Reactive depression  Lexapro 20mg    She has not noticed any improvement, he continues to have bouts of anxiety nervousness when he becomes really irritable and frustrated. This is due to him not remembering routine things throughout his day.    AK (actinic keratosis)  The patient continues to scratch these very irritating lesions on the top of his head. They are new and he does not use sunscreen nor wears hat he is bald.    Age-related cataract of left eye  Patient has difficulty seeing due to age-related cataract, however there financial hardship prevents him from going to see the ophthalmologist for now. They planned going next year.    Current medicines (including changes today)  Current Outpatient Prescriptions   Medication Sig Dispense Refill   • montelukast (SINGULAIR) 10 MG Tab Take 1 Tab by mouth every day. 90 Tab 1   • alprazolam (XANAX) 0.25 MG Tab Take 1 Tab by mouth at bedtime as needed for Sleep for up to 30 days. 30 Tab 0   • escitalopram (LEXAPRO) 20 MG tablet TAKE ONE TABLET BY MOUTH ONE TIME DAILY 90 Tab 0   • memantine (NAMENDA) 10 MG Tab TAKE 1 TABLET BY MOUTH TWICE DAILY 180 Tab 0   • donepezil (ARICEPT) 10 MG tablet Take 1 Tab by mouth every day. 90 Tab 1   • donepezil (ARICEPT) 10 MG  "tablet Take 1 Tab by mouth every 24 hours. 90 Tab 0   • meloxicam (MOBIC) 7.5 MG Tab Take 7.5 mg by mouth every day.     • aspirin (ASA) 81 MG Chew Tab chewable tablet Take 81 mg by mouth every day.       No current facility-administered medications for this visit.      He  has a past medical history of Alzheimer's dementia and Stroke (CMS-HCC).  He  has a past surgical history that includes hernia repair.  Social History   Substance Use Topics   • Smoking status: Former Smoker     Packs/day: 3.00     Years: 48.00     Types: Cigarettes     Quit date: 3/19/1996   • Smokeless tobacco: Former User     Types: Chew     Quit date: 3/19/1968   • Alcohol use No      Comment: recoverying alcoholic  1973     Social History     Social History Narrative   • No narrative on file     Family History   Problem Relation Age of Onset   • No Known Problems Mother    • No Known Problems Father    • No Known Problems Maternal Grandmother    • No Known Problems Maternal Grandfather    • No Known Problems Paternal Grandmother    • No Known Problems Paternal Grandfather      Family Status   Relation Status   • Mother    • Father    • Sister Alive   • Maternal Grandmother    • Maternal Grandfather    • Paternal Grandmother    • Paternal Grandfather          ROS    Please see hpi     All other systems reviewed and are negative     Objective:     Blood pressure 124/70, pulse 84, temperature 36.5 °C (97.7 °F), height 1.575 m (5' 2.01\"), weight 62.6 kg (138 lb), SpO2 98 %. Body mass index is 25.23 kg/m².  Physical Exam:    Constitutional: Alert, no distress.  Skin: Warm, dry, good turgor, no rashes in visible areas.  Eye: Equal, round and reactive, conjunctiva clear, lids normal.  ENMT: Lips without lesions, good dentition, oropharynx clear.  Neck: Trachea midline, no masses, no thyromegaly. No cervical or supraclavicular lymphadenopathy.  Respiratory: Unlabored respiratory effort, lungs " clear to auscultation, no wheezes, no ronchi.  Cardiovascular: Normal S1, S2, no murmur, no edema.  Abdomen: Soft, non-tender, no masses, no hepatosplenomegaly.  Psych: Alert and oriented x3, normal affect and mood.      SKIN EXAM        scalp with evidence of of solar damage present , spotty hyperpigmentation, scattered telangiectasias, and  Xerosis      PROCEDURE: CRYOTHERAPY  Discussed risks and benefits of cryotherapy including but not limited to scarring, hyperpigmentation, hypopigmentation, hypertrophic scarring, keiloid scarring, incomplete or no resolution of lesions treated,pain, undesirable cosemetic result, blistering, potential need for additional treatment including more invasive treatment. Patient expresses understanding and verbally acknowledges risks and consent to treatment. 2  applications of cryotherapy with 3 second freeze thaw cycle was applied to  3 AK's. Patient tolerated procedure well. There were no complications. Aftercare instructions given.            Assessment and Plan:   The following treatment plan was discussed      1. Need for vaccination    - INFLUENZA VACCINE, HIGH DOSE (65+ ONLY)    2. Cortical age-related cataract of left eye  Needs to f/u with ophathamology    3. Late onset Alzheimer's disease with behavioral disturbance  They would like to try Xanax prn severe agitation, not daily    - alprazolam (XANAX) 0.25 MG Tab; Take 1 Tab by mouth at bedtime as needed for Sleep for up to 30 days.  Dispense: 30 Tab; Refill: 0    4. Reactive depression      5. Chronic seasonal allergic rhinitis due to pollen    - montelukast (SINGULAIR) 10 MG Tab; Take 1 Tab by mouth every day.  Dispense: 90 Tab; Refill: 1    6. Snoring  Clinically suspicious for obstructive sleep apnea  [place] Sleepiness Scale score (ESS) of 10  We will proceed with  Polysomnography    We discussed all pathologies related to untreated sleep apnea  Including but not limited to pulmonary hypertension, stroke,  cardiovascular disease, difficulty losing weight, headaches, daytime somnolence, etc...    - REFERRAL TO PULMONOLOGY    7. AK (actinic keratosis)  Patient tollerrated procedure well  There were no adverse events  Patient was given post procedure precautions           HEALTH MAINTENANCE:    Instructed to Follow up in clinic or ER for worsening symptoms, difficulty breathing, lack of expected recovery, or should new symptoms or problems arise.    Followup: Return in about 3 months (around 3/28/2018) for Reevaluation.       Once again this medical record contains text that has been entered with the assistance of computer voice recognition and dictation software.  Therefore, it may contain unintended errors in text, spelling, punctuation, or grammar

## 2017-12-28 NOTE — ASSESSMENT & PLAN NOTE
On Namenda and Aricept x 7 years  They believe his memory is improving  He remembers his wife or murmurs his daughter  He continues to have some behavioral disturbances when he becomes very irritable, they think he may benefit from Xanax.

## 2017-12-28 NOTE — ASSESSMENT & PLAN NOTE
The patient continues to scratch these very irritating lesions on the top of his head. They are new and he does not use sunscreen nor wears hat he is bald.

## 2018-01-15 ENCOUNTER — APPOINTMENT (OUTPATIENT)
Dept: RADIOLOGY | Facility: MEDICAL CENTER | Age: 83
DRG: 057 | End: 2018-01-15
Attending: EMERGENCY MEDICINE
Payer: MEDICARE

## 2018-01-15 ENCOUNTER — RESOLUTE PROFESSIONAL BILLING HOSPITAL PROF FEE (OUTPATIENT)
Dept: HOSPITALIST | Facility: MEDICAL CENTER | Age: 83
End: 2018-01-15
Payer: MEDICARE

## 2018-01-15 ENCOUNTER — HOSPITAL ENCOUNTER (INPATIENT)
Facility: MEDICAL CENTER | Age: 83
LOS: 29 days | DRG: 057 | End: 2018-02-13
Attending: EMERGENCY MEDICINE | Admitting: HOSPITALIST
Payer: MEDICARE

## 2018-01-15 DIAGNOSIS — N30.00 ACUTE CYSTITIS WITHOUT HEMATURIA: ICD-10-CM

## 2018-01-15 DIAGNOSIS — E87.0 HYPERNATREMIA: ICD-10-CM

## 2018-01-15 DIAGNOSIS — G30.1 LATE ONSET ALZHEIMER'S DISEASE WITH BEHAVIORAL DISTURBANCE (HCC): Primary | ICD-10-CM

## 2018-01-15 DIAGNOSIS — R62.7 FTT (FAILURE TO THRIVE) IN ADULT: ICD-10-CM

## 2018-01-15 DIAGNOSIS — R45.1 AGITATION: ICD-10-CM

## 2018-01-15 DIAGNOSIS — F02.818 LATE ONSET ALZHEIMER'S DISEASE WITH BEHAVIORAL DISTURBANCE (HCC): Primary | ICD-10-CM

## 2018-01-15 PROBLEM — R45.851 SUICIDAL IDEATION: Status: ACTIVE | Noted: 2018-01-15

## 2018-01-15 PROBLEM — R82.71 ASYMPTOMATIC BACTERIURIA: Status: ACTIVE | Noted: 2018-01-15

## 2018-01-15 PROBLEM — F03.90 DEMENTIA (HCC): Status: ACTIVE | Noted: 2018-01-15

## 2018-01-15 PROBLEM — R45.4 IRRITABILITY: Status: ACTIVE | Noted: 2018-01-15

## 2018-01-15 LAB
ALBUMIN SERPL BCP-MCNC: 4.1 G/DL (ref 3.2–4.9)
ALBUMIN/GLOB SERPL: 1.9 G/DL
ALP SERPL-CCNC: 53 U/L (ref 30–99)
ALT SERPL-CCNC: 7 U/L (ref 2–50)
AMPHET UR QL SCN: NEGATIVE
ANION GAP SERPL CALC-SCNC: 10 MMOL/L (ref 0–11.9)
APPEARANCE UR: CLEAR
AST SERPL-CCNC: 11 U/L (ref 12–45)
BACTERIA #/AREA URNS HPF: NEGATIVE /HPF
BARBITURATES UR QL SCN: NEGATIVE
BASOPHILS # BLD AUTO: 0.7 % (ref 0–1.8)
BASOPHILS # BLD: 0.05 K/UL (ref 0–0.12)
BENZODIAZ UR QL SCN: POSITIVE
BILIRUB SERPL-MCNC: 0.9 MG/DL (ref 0.1–1.5)
BILIRUB UR QL STRIP.AUTO: NEGATIVE
BUN SERPL-MCNC: 20 MG/DL (ref 8–22)
BZE UR QL SCN: NEGATIVE
CALCIUM SERPL-MCNC: 9 MG/DL (ref 8.5–10.5)
CANNABINOIDS UR QL SCN: NEGATIVE
CAOX CRY #/AREA URNS HPF: ABNORMAL /HPF
CHLORIDE SERPL-SCNC: 115 MMOL/L (ref 96–112)
CO2 SERPL-SCNC: 25 MMOL/L (ref 20–33)
COLOR UR: YELLOW
CREAT SERPL-MCNC: 0.9 MG/DL (ref 0.5–1.4)
CULTURE IF INDICATED INDCX: YES UA CULTURE
EOSINOPHIL # BLD AUTO: 0.26 K/UL (ref 0–0.51)
EOSINOPHIL NFR BLD: 3.6 % (ref 0–6.9)
EPI CELLS #/AREA URNS HPF: ABNORMAL /HPF
ERYTHROCYTE [DISTWIDTH] IN BLOOD BY AUTOMATED COUNT: 49.1 FL (ref 35.9–50)
ETHANOL BLD-MCNC: 0 G/DL
GLOBULIN SER CALC-MCNC: 2.2 G/DL (ref 1.9–3.5)
GLUCOSE SERPL-MCNC: 92 MG/DL (ref 65–99)
GLUCOSE UR STRIP.AUTO-MCNC: NEGATIVE MG/DL
HCT VFR BLD AUTO: 42.6 % (ref 42–52)
HGB BLD-MCNC: 14.2 G/DL (ref 14–18)
HYALINE CASTS #/AREA URNS LPF: ABNORMAL /LPF
IMM GRANULOCYTES # BLD AUTO: 0.05 K/UL (ref 0–0.11)
IMM GRANULOCYTES NFR BLD AUTO: 0.7 % (ref 0–0.9)
KETONES UR STRIP.AUTO-MCNC: ABNORMAL MG/DL
LEUKOCYTE ESTERASE UR QL STRIP.AUTO: ABNORMAL
LYMPHOCYTES # BLD AUTO: 1.58 K/UL (ref 1–4.8)
LYMPHOCYTES NFR BLD: 22 % (ref 22–41)
MCH RBC QN AUTO: 33.5 PG (ref 27–33)
MCHC RBC AUTO-ENTMCNC: 33.3 G/DL (ref 33.7–35.3)
MCV RBC AUTO: 100.5 FL (ref 81.4–97.8)
METHADONE UR QL SCN: NEGATIVE
MICRO URNS: ABNORMAL
MONOCYTES # BLD AUTO: 0.31 K/UL (ref 0–0.85)
MONOCYTES NFR BLD AUTO: 4.3 % (ref 0–13.4)
MUCOUS THREADS #/AREA URNS HPF: ABNORMAL /HPF
NEUTROPHILS # BLD AUTO: 4.93 K/UL (ref 1.82–7.42)
NEUTROPHILS NFR BLD: 68.7 % (ref 44–72)
NITRITE UR QL STRIP.AUTO: NEGATIVE
NRBC # BLD AUTO: 0 K/UL
NRBC BLD-RTO: 0 /100 WBC
OPIATES UR QL SCN: NEGATIVE
OXYCODONE UR QL SCN: NEGATIVE
PCP UR QL SCN: NEGATIVE
PH UR STRIP.AUTO: 5 [PH]
PLATELET # BLD AUTO: 171 K/UL (ref 164–446)
PMV BLD AUTO: 10.8 FL (ref 9–12.9)
POTASSIUM SERPL-SCNC: 4.4 MMOL/L (ref 3.6–5.5)
PROPOXYPH UR QL SCN: NEGATIVE
PROT SERPL-MCNC: 6.3 G/DL (ref 6–8.2)
PROT UR QL STRIP: NEGATIVE MG/DL
RBC # BLD AUTO: 4.24 M/UL (ref 4.7–6.1)
RBC UR QL AUTO: NEGATIVE
RENAL EPI CELLS #/AREA URNS HPF: ABNORMAL /HPF
SODIUM SERPL-SCNC: 150 MMOL/L (ref 135–145)
SP GR UR STRIP.AUTO: 1.03
UROBILINOGEN UR STRIP.AUTO-MCNC: 1 MG/DL
WBC # BLD AUTO: 7.2 K/UL (ref 4.8–10.8)
WBC #/AREA URNS HPF: ABNORMAL /HPF

## 2018-01-15 PROCEDURE — 96374 THER/PROPH/DIAG INJ IV PUSH: CPT

## 2018-01-15 PROCEDURE — 700102 HCHG RX REV CODE 250 W/ 637 OVERRIDE(OP): Performed by: HOSPITALIST

## 2018-01-15 PROCEDURE — 87086 URINE CULTURE/COLONY COUNT: CPT

## 2018-01-15 PROCEDURE — 99223 1ST HOSP IP/OBS HIGH 75: CPT | Performed by: HOSPITALIST

## 2018-01-15 PROCEDURE — 85025 COMPLETE CBC W/AUTO DIFF WBC: CPT

## 2018-01-15 PROCEDURE — A9270 NON-COVERED ITEM OR SERVICE: HCPCS | Performed by: HOSPITALIST

## 2018-01-15 PROCEDURE — 80053 COMPREHEN METABOLIC PANEL: CPT

## 2018-01-15 PROCEDURE — 770006 HCHG ROOM/CARE - MED/SURG/GYN SEMI*

## 2018-01-15 PROCEDURE — 71045 X-RAY EXAM CHEST 1 VIEW: CPT

## 2018-01-15 PROCEDURE — 87040 BLOOD CULTURE FOR BACTERIA: CPT

## 2018-01-15 PROCEDURE — 700111 HCHG RX REV CODE 636 W/ 250 OVERRIDE (IP): Performed by: EMERGENCY MEDICINE

## 2018-01-15 PROCEDURE — 81001 URINALYSIS AUTO W/SCOPE: CPT

## 2018-01-15 PROCEDURE — 99285 EMERGENCY DEPT VISIT HI MDM: CPT

## 2018-01-15 PROCEDURE — 80307 DRUG TEST PRSMV CHEM ANLYZR: CPT

## 2018-01-15 RX ORDER — ASPIRIN 81 MG/1
81 TABLET, CHEWABLE ORAL DAILY
Status: DISCONTINUED | OUTPATIENT
Start: 2018-01-15 | End: 2018-01-15

## 2018-01-15 RX ORDER — DONEPEZIL HYDROCHLORIDE 5 MG/1
10 TABLET, FILM COATED ORAL EVERY 24 HOURS
Status: DISCONTINUED | OUTPATIENT
Start: 2018-01-15 | End: 2018-01-17

## 2018-01-15 RX ORDER — BISACODYL 10 MG
10 SUPPOSITORY, RECTAL RECTAL
Status: DISCONTINUED | OUTPATIENT
Start: 2018-01-15 | End: 2018-01-19

## 2018-01-15 RX ORDER — SODIUM CHLORIDE 450 MG/100ML
INJECTION, SOLUTION INTRAVENOUS CONTINUOUS
Status: DISCONTINUED | OUTPATIENT
Start: 2018-01-15 | End: 2018-01-16

## 2018-01-15 RX ORDER — ACETAMINOPHEN 325 MG/1
650 TABLET ORAL EVERY 6 HOURS PRN
Status: DISCONTINUED | OUTPATIENT
Start: 2018-01-15 | End: 2018-02-13 | Stop reason: HOSPADM

## 2018-01-15 RX ORDER — ALPRAZOLAM 0.25 MG/1
0.25 TABLET ORAL NIGHTLY PRN
Status: DISCONTINUED | OUTPATIENT
Start: 2018-01-15 | End: 2018-01-17

## 2018-01-15 RX ORDER — MEMANTINE HYDROCHLORIDE 10 MG/1
10 TABLET ORAL 2 TIMES DAILY
Status: DISCONTINUED | OUTPATIENT
Start: 2018-01-15 | End: 2018-02-13 | Stop reason: HOSPADM

## 2018-01-15 RX ORDER — AMOXICILLIN 250 MG
2 CAPSULE ORAL 2 TIMES DAILY
Status: DISCONTINUED | OUTPATIENT
Start: 2018-01-15 | End: 2018-01-19

## 2018-01-15 RX ORDER — POLYETHYLENE GLYCOL 3350 17 G/17G
1 POWDER, FOR SOLUTION ORAL
Status: DISCONTINUED | OUTPATIENT
Start: 2018-01-15 | End: 2018-01-19

## 2018-01-15 RX ORDER — MONTELUKAST SODIUM 10 MG/1
10 TABLET ORAL DAILY
Status: DISCONTINUED | OUTPATIENT
Start: 2018-01-16 | End: 2018-02-13 | Stop reason: HOSPADM

## 2018-01-15 RX ORDER — HEPARIN SODIUM 5000 [USP'U]/ML
5000 INJECTION, SOLUTION INTRAVENOUS; SUBCUTANEOUS EVERY 8 HOURS
Status: DISCONTINUED | OUTPATIENT
Start: 2018-01-15 | End: 2018-01-18

## 2018-01-15 RX ORDER — ONDANSETRON 4 MG/1
4 TABLET, ORALLY DISINTEGRATING ORAL EVERY 4 HOURS PRN
Status: DISCONTINUED | OUTPATIENT
Start: 2018-01-15 | End: 2018-02-13 | Stop reason: HOSPADM

## 2018-01-15 RX ORDER — CEFTRIAXONE 1 G/1
1 INJECTION, POWDER, FOR SOLUTION INTRAMUSCULAR; INTRAVENOUS ONCE
Status: COMPLETED | OUTPATIENT
Start: 2018-01-15 | End: 2018-01-15

## 2018-01-15 RX ORDER — ESCITALOPRAM OXALATE 10 MG/1
20 TABLET ORAL DAILY
Status: DISCONTINUED | OUTPATIENT
Start: 2018-01-16 | End: 2018-02-13 | Stop reason: HOSPADM

## 2018-01-15 RX ORDER — ONDANSETRON 2 MG/ML
4 INJECTION INTRAMUSCULAR; INTRAVENOUS EVERY 4 HOURS PRN
Status: DISCONTINUED | OUTPATIENT
Start: 2018-01-15 | End: 2018-01-18

## 2018-01-15 RX ADMIN — CEFTRIAXONE SODIUM 1 G: 1 INJECTION, POWDER, FOR SOLUTION INTRAMUSCULAR; INTRAVENOUS at 19:43

## 2018-01-15 RX ADMIN — MEMANTINE HYDROCHLORIDE 10 MG: 10 TABLET ORAL at 23:34

## 2018-01-15 RX ADMIN — DONEPEZIL HYDROCHLORIDE 10 MG: 5 TABLET, FILM COATED ORAL at 23:34

## 2018-01-15 RX ADMIN — STANDARDIZED SENNA CONCENTRATE AND DOCUSATE SODIUM 2 TABLET: 8.6; 5 TABLET, FILM COATED ORAL at 23:34

## 2018-01-15 ASSESSMENT — PAIN SCALES - GENERAL: PAINLEVEL_OUTOF10: 0

## 2018-01-16 PROBLEM — F02.80 ALZHEIMER'S DISEASE (HCC): Status: ACTIVE | Noted: 2018-01-15

## 2018-01-16 PROBLEM — R82.90 ABNORMAL URINALYSIS: Status: ACTIVE | Noted: 2018-01-16

## 2018-01-16 PROBLEM — G30.9 ALZHEIMER'S DISEASE (HCC): Status: ACTIVE | Noted: 2018-01-15

## 2018-01-16 LAB
ALBUMIN SERPL BCP-MCNC: 3.5 G/DL (ref 3.2–4.9)
BASOPHILS # BLD AUTO: 0.7 % (ref 0–1.8)
BASOPHILS # BLD: 0.04 K/UL (ref 0–0.12)
BUN SERPL-MCNC: 17 MG/DL (ref 8–22)
CALCIUM SERPL-MCNC: 8.2 MG/DL (ref 8.5–10.5)
CHLORIDE SERPL-SCNC: 104 MMOL/L (ref 96–112)
CO2 SERPL-SCNC: 25 MMOL/L (ref 20–33)
CREAT SERPL-MCNC: 0.83 MG/DL (ref 0.5–1.4)
EOSINOPHIL # BLD AUTO: 0.39 K/UL (ref 0–0.51)
EOSINOPHIL NFR BLD: 6.9 % (ref 0–6.9)
ERYTHROCYTE [DISTWIDTH] IN BLOOD BY AUTOMATED COUNT: 48 FL (ref 35.9–50)
GLUCOSE SERPL-MCNC: 75 MG/DL (ref 65–99)
HCT VFR BLD AUTO: 38.6 % (ref 42–52)
HGB BLD-MCNC: 12.9 G/DL (ref 14–18)
IMM GRANULOCYTES # BLD AUTO: 0.05 K/UL (ref 0–0.11)
IMM GRANULOCYTES NFR BLD AUTO: 0.9 % (ref 0–0.9)
LYMPHOCYTES # BLD AUTO: 1.66 K/UL (ref 1–4.8)
LYMPHOCYTES NFR BLD: 29.2 % (ref 22–41)
MCH RBC QN AUTO: 33.4 PG (ref 27–33)
MCHC RBC AUTO-ENTMCNC: 33.4 G/DL (ref 33.7–35.3)
MCV RBC AUTO: 100 FL (ref 81.4–97.8)
MONOCYTES # BLD AUTO: 0.31 K/UL (ref 0–0.85)
MONOCYTES NFR BLD AUTO: 5.5 % (ref 0–13.4)
NEUTROPHILS # BLD AUTO: 3.23 K/UL (ref 1.82–7.42)
NEUTROPHILS NFR BLD: 56.8 % (ref 44–72)
NRBC # BLD AUTO: 0 K/UL
NRBC BLD-RTO: 0 /100 WBC
PHOSPHATE SERPL-MCNC: 2.5 MG/DL (ref 2.5–4.5)
PLATELET # BLD AUTO: 149 K/UL (ref 164–446)
PMV BLD AUTO: 10.8 FL (ref 9–12.9)
POTASSIUM SERPL-SCNC: 3.7 MMOL/L (ref 3.6–5.5)
RBC # BLD AUTO: 3.86 M/UL (ref 4.7–6.1)
SODIUM SERPL-SCNC: 135 MMOL/L (ref 135–145)
WBC # BLD AUTO: 5.7 K/UL (ref 4.8–10.8)

## 2018-01-16 PROCEDURE — 700102 HCHG RX REV CODE 250 W/ 637 OVERRIDE(OP): Performed by: HOSPITALIST

## 2018-01-16 PROCEDURE — A9270 NON-COVERED ITEM OR SERVICE: HCPCS | Performed by: HOSPITALIST

## 2018-01-16 PROCEDURE — 36415 COLL VENOUS BLD VENIPUNCTURE: CPT

## 2018-01-16 PROCEDURE — 80069 RENAL FUNCTION PANEL: CPT

## 2018-01-16 PROCEDURE — 85025 COMPLETE CBC W/AUTO DIFF WBC: CPT

## 2018-01-16 PROCEDURE — 99232 SBSQ HOSP IP/OBS MODERATE 35: CPT | Performed by: INTERNAL MEDICINE

## 2018-01-16 PROCEDURE — 770001 HCHG ROOM/CARE - MED/SURG/GYN PRIV*

## 2018-01-16 PROCEDURE — 700105 HCHG RX REV CODE 258: Performed by: HOSPITALIST

## 2018-01-16 PROCEDURE — 700111 HCHG RX REV CODE 636 W/ 250 OVERRIDE (IP): Performed by: HOSPITALIST

## 2018-01-16 RX ADMIN — SODIUM CHLORIDE: 4.5 INJECTION, SOLUTION INTRAVENOUS at 00:22

## 2018-01-16 RX ADMIN — STANDARDIZED SENNA CONCENTRATE AND DOCUSATE SODIUM 2 TABLET: 8.6; 5 TABLET, FILM COATED ORAL at 07:52

## 2018-01-16 RX ADMIN — SODIUM CHLORIDE: 4.5 INJECTION, SOLUTION INTRAVENOUS at 07:55

## 2018-01-16 RX ADMIN — HEPARIN SODIUM 5000 UNITS: 5000 INJECTION, SOLUTION INTRAVENOUS; SUBCUTANEOUS at 14:27

## 2018-01-16 RX ADMIN — ESCITALOPRAM OXALATE 20 MG: 10 TABLET ORAL at 07:52

## 2018-01-16 RX ADMIN — MONTELUKAST SODIUM 10 MG: 10 TABLET, COATED ORAL at 07:52

## 2018-01-16 RX ADMIN — HEPARIN SODIUM 5000 UNITS: 5000 INJECTION, SOLUTION INTRAVENOUS; SUBCUTANEOUS at 22:00

## 2018-01-16 RX ADMIN — MEMANTINE HYDROCHLORIDE 10 MG: 10 TABLET ORAL at 19:55

## 2018-01-16 RX ADMIN — MEMANTINE HYDROCHLORIDE 10 MG: 10 TABLET ORAL at 07:52

## 2018-01-16 RX ADMIN — DONEPEZIL HYDROCHLORIDE 10 MG: 5 TABLET, FILM COATED ORAL at 18:28

## 2018-01-16 RX ADMIN — STANDARDIZED SENNA CONCENTRATE AND DOCUSATE SODIUM 2 TABLET: 8.6; 5 TABLET, FILM COATED ORAL at 19:55

## 2018-01-16 ASSESSMENT — PAIN SCALES - GENERAL
PAINLEVEL_OUTOF10: 0

## 2018-01-16 ASSESSMENT — ENCOUNTER SYMPTOMS
FEVER: 0
VOMITING: 0
COUGH: 0
CHILLS: 0
LOSS OF CONSCIOUSNESS: 1
WHEEZING: 0
SHORTNESS OF BREATH: 0
NAUSEA: 0
CONSTIPATION: 0
HEADACHES: 0
DIARRHEA: 0

## 2018-01-16 NOTE — ED PROVIDER NOTES
"ED Provider Note    Scribed for Phillip Boykin M.D. by Aisha Prasad. 1/15/2018  5:14 PM    Primary Care Provider: Mando Chin M.D.  Means of arrival: Ambulance   History limited by: None     CHIEF COMPLAINT  Chief Complaint   Patient presents with   • Psych Eval     HPI  Lui Denise is a 82 y.o. male who presents to the ED by ambulance for psychiatric evaluation. Per wife, the patient has been living with her and she has been the patient's caretaker. She states he has been \"too much for her to take care of\". Per nursing, the patient's family is requesting home placement secondary to the patient's dementia. The patient had reportedly overheard his wife talking to resources on the phone regarding home placement and he made a suicidal statement. Patient's step daughter reports the patient said he was \"going to do it today\". Family denies any guns in the hold. Dr. Lazo is the patient's primary care provider. Patient does not drink alcohol and has been sober since the 1970s. Patient denies shortness of breath, nausea, vomiting, new skin rashes or newfound weakness or numbness to extremities.     REVIEW OF SYSTEMS    EYES:  Denies blurred vision.   ENT:  Denies sore throat.  CARDIOVASCULAR:  Denies chest pain.  RESPIRATORY:  Denies shortness of breath.  GI:  Denies nausea and vomiting.  MUSCULOSKELETAL:  Denies weakness.  SKIN:  No rash.  NEUROLOGIC: Denies headache.  PSYCHIATRIC: Positive for suicidal ideation.   All other systems have been reviewed and are negative.   C.     PAST MEDICAL HISTORY  Past Medical History:   Diagnosis Date   • Alzheimer's dementia    • Stroke (CMS-[facility])     intestial blockage, memory       FAMILY HISTORY  Family History   Problem Relation Age of Onset   • No Known Problems Mother    • No Known Problems Father    • No Known Problems Maternal Grandmother    • No Known Problems Maternal Grandfather    • No Known Problems Paternal Grandmother    • No Known Problems " Paternal Grandfather        SOCIAL HISTORY   reports that he quit smoking about 21 years ago. His smoking use included Cigarettes. He has a 144.00 pack-year smoking history. He quit smokeless tobacco use about 49 years ago. His smokeless tobacco use included Chew. He reports that he does not drink alcohol or use drugs.    SURGICAL HISTORY  Past Surgical History:   Procedure Laterality Date   • HERNIA REPAIR      reconstruction 2011       CURRENT MEDICATIONS  No current facility-administered medications on file prior to encounter.      Current Outpatient Prescriptions on File Prior to Encounter   Medication Sig Dispense Refill   • montelukast (SINGULAIR) 10 MG Tab Take 1 Tab by mouth every day. 90 Tab 1   • alprazolam (XANAX) 0.25 MG Tab Take 1 Tab by mouth at bedtime as needed for Sleep for up to 30 days. 30 Tab 0   • escitalopram (LEXAPRO) 20 MG tablet TAKE ONE TABLET BY MOUTH ONE TIME DAILY 90 Tab 0   • donepezil (ARICEPT) 10 MG tablet Take 1 Tab by mouth every 24 hours. 90 Tab 0   • memantine (NAMENDA) 10 MG Tab TAKE 1 TABLET BY MOUTH TWICE DAILY 180 Tab 0   • meloxicam (MOBIC) 7.5 MG Tab Take 7.5 mg by mouth every day.     • aspirin (ASA) 81 MG Chew Tab chewable tablet Take 81 mg by mouth every day.     • donepezil (ARICEPT) 10 MG tablet Take 1 Tab by mouth every day. 90 Tab 1        ALLERGIES  No Known Allergies    PHYSICAL EXAM  VITAL SIGNS: /81   Pulse 65   Temp 37.1 °C (98.7 °F)   Resp 15   Wt 63 kg (138 lb 14.2 oz)   SpO2 95%   BMI 25.40 kg/m²      Constitutional: Patient is awake and alert. Obviously confused No acute respiratory distress. Well developed, Well nourished, Non-toxic appearance.  HENT: Normocephalic, Atraumatic, Bilateral external ears normal, Oropharynx pink moist with no exudates, Nose patent.  Eyes: PERRLA, EOMI, Sclera and conjunctiva clear, No discharge.   Neck: Midline. Supple no nuchal rigidity, no thyromegaly or mass. Non-tender  Lymphatic: No supraclavicular lymph nodes.    Cardiovascular: Heart is regular rate and rhythm no murmur, rub or thrill.   Thorax & Lungs: Chest is symmetrical, with good breath sounds. No wheezing or crackles. No respiratory distress, No chest tenderness.   Abdomen: Soft, No tenderness no hepatosplenomegaly there is no guarding or rebound, No masses, No pulsatile masses.   Skin: Warm, Dry, no petechia, purpura, or rash.   Back: Non tender with palpation, No CVA tenderness.   Extremities: No edema. Non tender.   Musculoskeletal: Good range of motion to wrists, elbows, shoulders, hips, knees, and ankles. Pulses 2+ radially and femorally. No gross deformities noted.   Neurologic: Alert & does not know year or month or where he is at. Strength is 5 over 5 and symmetric in bilateral upper and lower extremities.  Sensory is intact to light touch to face, arms, and legs.  DTRs are symmetrical in biceps brachioradialis, patella and Achilles.   Psychiatric: Agitated confused         LABS  Results for orders placed or performed during the hospital encounter of 01/15/18   CBC WITH DIFFERENTIAL   Result Value Ref Range    WBC 7.2 4.8 - 10.8 K/uL    RBC 4.24 (L) 4.70 - 6.10 M/uL    Hemoglobin 14.2 14.0 - 18.0 g/dL    Hematocrit 42.6 42.0 - 52.0 %    .5 (H) 81.4 - 97.8 fL    MCH 33.5 (H) 27.0 - 33.0 pg    MCHC 33.3 (L) 33.7 - 35.3 g/dL    RDW 49.1 35.9 - 50.0 fL    Platelet Count 171 164 - 446 K/uL    MPV 10.8 9.0 - 12.9 fL    Neutrophils-Polys 68.70 44.00 - 72.00 %    Lymphocytes 22.00 22.00 - 41.00 %    Monocytes 4.30 0.00 - 13.40 %    Eosinophils 3.60 0.00 - 6.90 %    Basophils 0.70 0.00 - 1.80 %    Immature Granulocytes 0.70 0.00 - 0.90 %    Nucleated RBC 0.00 /100 WBC    Neutrophils (Absolute) 4.93 1.82 - 7.42 K/uL    Lymphs (Absolute) 1.58 1.00 - 4.80 K/uL    Monos (Absolute) 0.31 0.00 - 0.85 K/uL    Eos (Absolute) 0.26 0.00 - 0.51 K/uL    Baso (Absolute) 0.05 0.00 - 0.12 K/uL    Immature Granulocytes (abs) 0.05 0.00 - 0.11 K/uL    NRBC (Absolute) 0.00 K/uL    COMP METABOLIC PANEL   Result Value Ref Range    Sodium 150 (H) 135 - 145 mmol/L    Potassium 4.4 3.6 - 5.5 mmol/L    Chloride 115 (H) 96 - 112 mmol/L    Co2 25 20 - 33 mmol/L    Anion Gap 10.0 0.0 - 11.9    Glucose 92 65 - 99 mg/dL    Bun 20 8 - 22 mg/dL    Creatinine 0.90 0.50 - 1.40 mg/dL    Calcium 9.0 8.5 - 10.5 mg/dL    AST(SGOT) 11 (L) 12 - 45 U/L    ALT(SGPT) 7 2 - 50 U/L    Alkaline Phosphatase 53 30 - 99 U/L    Total Bilirubin 0.9 0.1 - 1.5 mg/dL    Albumin 4.1 3.2 - 4.9 g/dL    Total Protein 6.3 6.0 - 8.2 g/dL    Globulin 2.2 1.9 - 3.5 g/dL    A-G Ratio 1.9 g/dL   URINE DRUG SCREEN (TRIAGE)   Result Value Ref Range    Amphetamines Urine Negative Negative    Barbiturates Negative Negative    Benzodiazepines Positive (A) Negative    Cocaine Metabolite Negative Negative    Methadone Negative Negative    Opiates Negative Negative    Oxycodone Negative Negative    Phencyclidine -Pcp Negative Negative    Propoxyphene Negative Negative    Cannabinoid Metab Negative Negative   URINALYSIS CULTURE, IF INDICATED   Result Value Ref Range    Color Yellow     Character Clear     Specific Gravity 1.031 <1.035    Ph 5.0 5.0 - 8.0    Glucose Negative Negative mg/dL    Ketones Trace (A) Negative mg/dL    Protein Negative Negative mg/dL    Bilirubin Negative Negative    Urobilinogen, Urine 1.0 Negative    Nitrite Negative Negative    Leukocyte Esterase Small (A) Negative    Occult Blood Negative Negative    Micro Urine Req Microscopic     Culture Indicated Yes UA Culture   DIAGNOSTIC ALCOHOL   Result Value Ref Range    Diagnostic Alcohol 0.00 0.00 g/dL   URINE MICROSCOPIC (W/UA)   Result Value Ref Range    WBC 10-20 (A) /hpf    Bacteria Negative None /hpf    Epithelial Cells Few /hpf    Epithelial Cells Renal Rare /hpf    Mucous Threads Few /hpf    Ca Oxalate Crystal Few /hpf    Hyaline Cast 6-10 (A) /lpf   ESTIMATED GFR   Result Value Ref Range    GFR If African American >60 >60 mL/min/1.73 m 2    GFR If Non African  American >60 >60 mL/min/1.73 m 2      All labs reviewed by me.    RADIOLOGY/PROCEDURES  DX-CHEST-PORTABLE (1 VIEW)   Final Result         1. No acute cardiopulmonary abnormalities are identified.        The radiologist's interpretations of all radiological studies have been reviewed by me.     COURSE & MEDICAL DECISION MAKING  Pertinent Labs & Imaging studies reviewed. (See chart for details)        5:14 PM - Patient seen and examined at bedside. Ordered chest x-ray, estimated GFR, CBC, CMP, urine drug screen and other labs.     5:17 PM Spoke with family outside of room. Patient was making suicidal comments after discovering his wife was considering home placement.     5:28 PM Consulted with .      Decision Making  Patient has dementia. He's got much worse recently. His wife can no longer take care of him. She's been trying to find a place for him to be able to go to group home or some other situation. Today became very agitated and left the house and she is afraid he get lost. He did come back. He also threatened to harm himself with a knife as well. Urine emergency department is been agitated confused. He actually has hyponatremia and a urinary tract infection. I discussed the case with Dr. Landaverde who will admit him to the hospital for further care and evaluation. I also discussed the case with  and they are going to also help with possible placement once his urinary tract infection and hyponatremia been cared for.        FINAL IMPRESSION   1. Confusion and agitation  2. Hyponatremia  3. Urinary tract infection    PLAN  1. Admission [facility]  2. IV antibiotics  3. Continue workup and evaluation of his agitation and hyponatremia and urinary tract infection       Aisha VIZCAINO (Arlene), am scribing for, and in the presence of, Phillip Boykin M.D..    Electronically signed by: Aisha Prasad (Arlene), 1/15/2018    Phillip VIZCAINO M.D. personally performed the services  described in this documentation, as scribed by Aisha Prasad in my presence, and it is both accurate and complete.    The note accurately reflects work and decisions made by me.  Phillip Boyikn  1/15/2018  7:40 PM

## 2018-01-16 NOTE — ASSESSMENT & PLAN NOTE
Agitation has improved.  Continue Frequent re-orientation, re assurance.  Treat depression  Trial of Namenda.  Plan dc to group home.

## 2018-01-16 NOTE — ASSESSMENT & PLAN NOTE
- Given a dose of ceftriaxone in the ER  - Does not have a UTI  - Defer further antibiotics  - Blood and urine cultures pending

## 2018-01-16 NOTE — ASSESSMENT & PLAN NOTE
No active SI or depressed affect.   Continue [facility]  Psychiatry has evaluated patient and cleared of suicide risk.   Patient had repiratory treatment, states feeling improved.

## 2018-01-16 NOTE — PROGRESS NOTES
[facility] Progress Note    Date of Service: 2018    Chief Complaint  82 y.o. Male w/h/o Alzheimer's, stroke  admitted 1/15/2018 after been brought to ER by wife as she cannot take care of him any more, pt also found to have hypernatremia.      Interval Problem Update  Pt seen and examined, afebrile, no overnight events.     Consultants/Specialty  None     Disposition  TBD         Review of Systems   Unable to perform ROS: Dementia      Physical Exam  Laboratory/Imaging   Hemodynamics  Temp (24hrs), Av.6 °C (97.8 °F), Min:36.1 °C (97 °F), Max:37.1 °C (98.7 °F)   Temperature: 36.1 °C (97 °F)  Pulse  Av.5  Min: 60  Max: 70    Blood Pressure : 127/80      Respiratory      Respiration: 16, Pulse Oximetry: 98 %             Fluids    Intake/Output Summary (Last 24 hours) at 18 1424  Last data filed at 18 1000   Gross per 24 hour   Intake             1140 ml   Output              600 ml   Net              540 ml       Nutrition  Orders Placed This Encounter   Procedures   • Diet Order     Standing Status:   Standing     Number of Occurrences:   1     Order Specific Question:   Diet:     Answer:   Regular [1]     Order Specific Question:   Texture/Fiber modifications:     Answer:   Dysphagia 3(Mechanical Soft)specify fluid consistency(question 6) [3]     Physical Exam   HENT:   Head: Normocephalic and atraumatic.   Eyes: Conjunctivae are normal. No scleral icterus.   Neck: Neck supple. No JVD present.   Cardiovascular: Normal heart sounds.  Exam reveals no gallop.    Pulmonary/Chest: Effort normal and breath sounds normal. He has no wheezes. He has no rales.   Abdominal: Soft. Bowel sounds are normal. He exhibits no distension. There is no tenderness.   Musculoskeletal: He exhibits no edema.   Neurological: He is alert.   Skin: Skin is warm and dry. No erythema.   Nursing note and vitals reviewed.      Recent Labs      01/15/18   1814  18   0348   WBC  7.2  5.7   RBC  4.24*  3.86*    HEMOGLOBIN  14.2  12.9*   HEMATOCRIT  42.6  38.6*   MCV  100.5*  100.0*   MCH  33.5*  33.4*   MCHC  33.3*  33.4*   RDW  49.1  48.0   PLATELETCT  171  149*   MPV  10.8  10.8     Recent Labs      01/15/18   1814  01/16/18   0348   SODIUM  150*  135   POTASSIUM  4.4  3.7   CHLORIDE  115*  104   CO2  25  25   GLUCOSE  92  75   BUN  20  17   CREATININE  0.90  0.83   CALCIUM  9.0  8.2*                      Assessment/Plan     Suicidal ideation- (present on admission)   Assessment & Plan    - Was noted to say this at home but patient does not currently have suicidal ideation  - Likely due to dementia and agitation at home has not expressed any more SI   - Not on a legal hold  - will consider psych consult if patient expresses suicidal ideation in hospital        Irritability- (present on admission)   Assessment & Plan    - Likely worsened by hypernatremia  - Treating hypernatremia  - more calm this AM         FTT (failure to thrive) in adult- (present on admission)   Assessment & Plan    - Wife unable to care for him at home  - Likely will need placement        Asymptomatic bacteriuria- (present on admission)   Assessment & Plan    - Given a dose of ceftriaxone in the ER  - Does not have a UTI  - Defer further antibiotics  - Blood and urine cultures pending        Hypernatremia- (present on admission)   Assessment & Plan    - Likely due to dehydration  - resolved after 1/2 NS         Late onset Alzheimer's disease with behavioral disturbance- (present on admission)   Assessment & Plan    - Chronic, worsening  - Continue on  donepezil, escitalopram, memantine  - Continue Xanax p.r.n.            Reviewed items::  Labs reviewed, Radiology images reviewed and Medications reviewed  Shaw catheter::  No Shaw  DVT prophylaxis pharmacological::  Heparin

## 2018-01-16 NOTE — ED NOTES
Med rec updated and complete.  Allergies reviewed.  Per family .  Pt is not able to communicate his current medications at this time.

## 2018-01-16 NOTE — PROGRESS NOTES
"Received pt from ED at 2214.  Pt is AAOx2, disoriented to place and event.  Explained situation to pt, and he states \"my wife just wants to get rid of me\".  Moves upper extremities 5/5 and lower extremities 4/5.  Denies numbness or tingling.  Up with standby assist, steady gait.  Pt complains of no pain.  Pt denies nausea/vomiting.  Tolerating mechanical soft diet.  Pt voiding without difficulty.   POC discussed, pt verbalizes understanding.  Bed alarm on.  Call light within reach, bed in lowest position, close observation in place.        "

## 2018-01-16 NOTE — CARE PLAN
Problem: Safety  Goal: Will remain free from falls    Intervention: Implement fall precautions  Bed alarm in use. Call light w/in reach. Instructed pt to call for assistance before getting OOB- pt verbalized understanding.        Problem: Discharge Barriers/Planning  Goal: Patient's continuum of care needs will be met    Intervention: Involve patient and significant other/support system in setting and prioritizing goals for hospital stay and discharge  Spoke with wife over the phone regarding MD ordered for SW to start discharge planning for SNF placement.

## 2018-01-16 NOTE — DIETARY
Nutrition Services: Pt seen for FTT diagnosis. Pt with hx of alzheimer's with progressive dementia. Reviewed with RN who reports pt appropriate to discuss diet. Attempted to speak with pt, pt refused and very upset that family has admitted him to hospital. Discussed with MD, Po intake unclear at this time and MD wants to give him a couple days to see how he does. Diet=Regular, Dysphagia III. Labs/Meds reviewed. No skin breakdown documented. Current wt is 62.3 kg/ 137 lbs and BMI of 24.33.     Plan/Rec:  Nutrition Representative to see pt daily for snacks/meal preferences as appropriate with diet order. RD follow to monitor PO intake.

## 2018-01-16 NOTE — PROGRESS NOTES
Pt aaox2. Pt impulsive, does not call. Bed alarm on. Up w/ x1 assist with cane. Pt denies pain.  +BM x2, blood on toilet paper after pt wiped. Voiding frequently. IV fluids stopped per order. Good appetite. Bed alarm in use. Call light in reach. Q 15 minute checks completed.

## 2018-01-16 NOTE — H&P
Hospital Medicine History and Physical      Date of Service  1/15/2018    Chief Complaint  Chief Complaint   Patient presents with   • Psych Eval     History of Presenting Illness  Denise is a 82 y.o. male w/h/o Alzheimer's, stroke who presents with hypernatremia and SI. Patient has had progressive dementia from Alzheimer's. Patient's wife is no longer able to care for him at home. She was looking for a place for him to go live. She [facility] with [facility] and was speaking with Shamika Samuel do  there. She got a contact for a local home which she was on the phone talking to them with. The patient overheard her talking and became irritated and felt that she was trying to get rid of him. He called their stepdaughter who came over and saw that he was very sad. He said he figured he might as well just end it all. Thus, they brought him over to the hospital.  Wife does have tinnitus which makes the medication more difficult but she was able to provide a history as the patient was not able.    Primary Care Physician  Mando Chin M.D.    Code Status  Full code wife and sister at bedside as patient is unable to provide himself    Review of Systems  Review of Systems   Constitutional: Negative for chills (Feels the thermostat is too high) and fever.   Respiratory: Negative for cough, shortness of breath and wheezing.    Cardiovascular: Positive for chest pain (With anxiety).   Gastrointestinal: Negative for constipation, diarrhea, nausea and vomiting.   Genitourinary: Negative for dysuria.   Neurological: Positive for loss of consciousness. Negative for headaches.     Please see HPI, all other systems were reviewed and are negative (AMA/CMS criteria)     Past Medical History  Past Medical History:   Diagnosis Date   • Alzheimer's dementia    • Stroke (CMS-HCC)     intestial blockage, memory       Surgical History  Past Surgical History:   Procedure  Laterality Date   • HERNIA REPAIR      reconstruction        Medications  No current facility-administered medications on file prior to encounter.      Current Outpatient Prescriptions on File Prior to Encounter   Medication Sig Dispense Refill   • montelukast (SINGULAIR) 10 MG Tab Take 1 Tab by mouth every day. 90 Tab 1   • alprazolam (XANAX) 0.25 MG Tab Take 1 Tab by mouth at bedtime as needed for Sleep for up to 30 days. 30 Tab 0   • escitalopram (LEXAPRO) 20 MG tablet TAKE ONE TABLET BY MOUTH ONE TIME DAILY 90 Tab 0   • donepezil (ARICEPT) 10 MG tablet Take 1 Tab by mouth every 24 hours. 90 Tab 0   • memantine (NAMENDA) 10 MG Tab TAKE 1 TABLET BY MOUTH TWICE DAILY 180 Tab 0     Family History  Family History   Problem Relation Age of Onset   • No Known Problems Mother    • No Known Problems Father    • No Known Problems Maternal Grandmother    • No Known Problems Maternal Grandfather    • No Known Problems Paternal Grandmother    • No Known Problems Paternal Grandfather      Social History  Social History   Substance Use Topics   • Smoking status: Former Smoker     Packs/day: 3.00     Years: 48.00     Types: Cigarettes     Quit date: 3/19/1996   • Smokeless tobacco: Former User     Types: Chew     Quit date: 3/19/1968   • Alcohol use No      Comment: recoverying alcoholic  1973       Allergies  No Known Allergies     Physical Exam  Laboratory   Hemodynamics  Temp (24hrs), Av.7 °C (98.1 °F), Min:36.4 °C (97.5 °F), Max:37.1 °C (98.7 °F)   Temperature: 36.4 °C (97.5 °F)  Pulse  Av.7  Min: 60  Max: 65    Blood Pressure : 116/81      Respiratory      Respiration: 16, Pulse Oximetry: 98 %             Physical Exam   Constitutional: He appears well-developed and well-nourished.   HENT:   Head: Normocephalic.   Right Ear: External ear normal.   Left Ear: External ear normal.   Nose: Nose normal.   Eyes: Conjunctivae and EOM are normal. Pupils are equal, round, and reactive to light. Right eye  exhibits no discharge. Left eye exhibits no discharge. No scleral icterus.   Neck: Neck supple. No tracheal deviation present.   Cardiovascular: Normal rate.  Exam reveals no gallop and no friction rub.    Pulmonary/Chest: No respiratory distress. He has no wheezes. He has no rales.   Abdominal: Soft. He exhibits no distension. There is no tenderness. There is no rebound and no guarding.   Musculoskeletal: He exhibits no edema.   Neurological: He is alert.   Confused with dementia   Skin: Skin is warm and dry. No rash noted. No erythema.   Psychiatric: He has a normal mood and affect.       Recent Labs      01/15/18   1814   WBC  7.2   RBC  4.24*   HEMOGLOBIN  14.2   HEMATOCRIT  42.6   MCV  100.5*   MCH  33.5*   MCHC  33.3*   RDW  49.1   PLATELETCT  171   MPV  10.8     Recent Labs      01/15/18   1814   SODIUM  150*   POTASSIUM  4.4   CHLORIDE  115*   CO2  25   GLUCOSE  92   BUN  20   CREATININE  0.90   CALCIUM  9.0     Recent Labs      01/15/18   1814   ALTSGPT  7   ASTSGOT  11*   ALKPHOSPHAT  53   TBILIRUBIN  0.9   GLUCOSE  92                 No results found for: TROPONINI    Imaging  DX-CHEST-PORTABLE (1 VIEW)   Final Result         1. No acute cardiopulmonary abnormalities are identified.         Assessment/Plan     I anticipate this patient will require at least two midnights for appropriate medical management, necessitating inpatient admission.    Suicidal ideation- (present on admission)   Assessment & Plan    - Was noted to say this at home but patient does not currently have suicidal ideation  - Likely due to dementia and agitation at home  - Not on a legal hold  - Consider psych consult if patient expresses suicidal ideation in hospital        Irritability- (present on admission)   Assessment & Plan    - Likely worsened by hypernatremia  - Treating hypernatremia        FTT (failure to thrive) in adult- (present on admission)   Assessment & Plan    - Wife unable to care for him at home  - Likely will need  placement        Asymptomatic bacteriuria- (present on admission)   Assessment & Plan    - Given a dose of ceftriaxone in the ER  - Does not have a UTI  - Defer further antibiotics  - Blood and urine cultures pending        Hypernatremia- (present on admission)   Assessment & Plan    - Likely due to dehydration  - Treating with half-normal saline        Late onset Alzheimer's disease with behavioral disturbance- (present on admission)   Assessment & Plan    - Chronic, worsening  - Continued donepezil, escitalopram, memantine  - Continue Xanax p.r.n.            Prophylaxis:  sc heparin

## 2018-01-16 NOTE — ED NOTES
Pt presents to ED from home via EMS in need of psych eval and family is requesting placement; pt has hx of dementia and has been living @ home with wife; wife reports applying for placement for pt and was talking on phone today with facility when pt overheard communication, pt then made statement of SI to family

## 2018-01-16 NOTE — CARE PLAN
Problem: Safety  Goal: Will remain free from falls  Outcome: PROGRESSING AS EXPECTED  Assessed the patient for fall risk factors. Provided education regarding the need to call for assistance. Bed alarm in place, bed in lowest position, call light in reach, close observation in place.      Problem: Infection  Goal: Will remain free from infection  Outcome: PROGRESSING AS EXPECTED  Assessed pt for signs and symptoms of infection.  Educated pt regarding hand hygiene.

## 2018-01-17 ENCOUNTER — APPOINTMENT (OUTPATIENT)
Dept: RADIOLOGY | Facility: MEDICAL CENTER | Age: 83
DRG: 057 | End: 2018-01-17
Attending: INTERNAL MEDICINE
Payer: MEDICARE

## 2018-01-17 LAB
25(OH)D3 SERPL-MCNC: 12 NG/ML (ref 30–100)
ANION GAP SERPL CALC-SCNC: 10 MMOL/L (ref 0–11.9)
BACTERIA UR CULT: NORMAL
BUN SERPL-MCNC: 12 MG/DL (ref 8–22)
CALCIUM SERPL-MCNC: 8.8 MG/DL (ref 8.5–10.5)
CHLORIDE SERPL-SCNC: 105 MMOL/L (ref 96–112)
CHOLEST SERPL-MCNC: 144 MG/DL (ref 100–199)
CO2 SERPL-SCNC: 22 MMOL/L (ref 20–33)
CREAT SERPL-MCNC: 0.9 MG/DL (ref 0.5–1.4)
EKG IMPRESSION: NORMAL
ERYTHROCYTE [DISTWIDTH] IN BLOOD BY AUTOMATED COUNT: 48.6 FL (ref 35.9–50)
EST. AVERAGE GLUCOSE BLD GHB EST-MCNC: 100 MG/DL
GLUCOSE SERPL-MCNC: 80 MG/DL (ref 65–99)
HBA1C MFR BLD: 5.1 % (ref 0–5.6)
HCT VFR BLD AUTO: 39.3 % (ref 42–52)
HDLC SERPL-MCNC: 39 MG/DL
HGB BLD-MCNC: 13 G/DL (ref 14–18)
LDLC SERPL CALC-MCNC: 82 MG/DL
MAGNESIUM SERPL-MCNC: 1.7 MG/DL (ref 1.5–2.5)
MCH RBC QN AUTO: 33.5 PG (ref 27–33)
MCHC RBC AUTO-ENTMCNC: 33.1 G/DL (ref 33.7–35.3)
MCV RBC AUTO: 101.3 FL (ref 81.4–97.8)
PLATELET # BLD AUTO: 156 K/UL (ref 164–446)
PMV BLD AUTO: 10.7 FL (ref 9–12.9)
POTASSIUM SERPL-SCNC: 3.8 MMOL/L (ref 3.6–5.5)
RBC # BLD AUTO: 3.88 M/UL (ref 4.7–6.1)
SIGNIFICANT IND 70042: NORMAL
SITE SITE: NORMAL
SODIUM SERPL-SCNC: 137 MMOL/L (ref 135–145)
SOURCE SOURCE: NORMAL
T4 FREE SERPL-MCNC: 1.03 NG/DL (ref 0.53–1.43)
TRIGL SERPL-MCNC: 115 MG/DL (ref 0–149)
TSH SERPL DL<=0.005 MIU/L-ACNC: 1.69 UIU/ML (ref 0.38–5.33)
VIT B12 SERPL-MCNC: 181 PG/ML (ref 211–911)
WBC # BLD AUTO: 5.7 K/UL (ref 4.8–10.8)

## 2018-01-17 PROCEDURE — 82607 VITAMIN B-12: CPT

## 2018-01-17 PROCEDURE — 80048 BASIC METABOLIC PNL TOTAL CA: CPT

## 2018-01-17 PROCEDURE — 93010 ELECTROCARDIOGRAM REPORT: CPT | Performed by: INTERNAL MEDICINE

## 2018-01-17 PROCEDURE — 82728 ASSAY OF FERRITIN: CPT

## 2018-01-17 PROCEDURE — 82746 ASSAY OF FOLIC ACID SERUM: CPT

## 2018-01-17 PROCEDURE — 82306 VITAMIN D 25 HYDROXY: CPT

## 2018-01-17 PROCEDURE — G8979 MOBILITY GOAL STATUS: HCPCS | Mod: CI

## 2018-01-17 PROCEDURE — 80061 LIPID PANEL: CPT

## 2018-01-17 PROCEDURE — 84443 ASSAY THYROID STIM HORMONE: CPT

## 2018-01-17 PROCEDURE — 700102 HCHG RX REV CODE 250 W/ 637 OVERRIDE(OP): Performed by: HOSPITALIST

## 2018-01-17 PROCEDURE — 93005 ELECTROCARDIOGRAM TRACING: CPT | Performed by: INTERNAL MEDICINE

## 2018-01-17 PROCEDURE — 70450 CT HEAD/BRAIN W/O DYE: CPT

## 2018-01-17 PROCEDURE — 700102 HCHG RX REV CODE 250 W/ 637 OVERRIDE(OP): Performed by: INTERNAL MEDICINE

## 2018-01-17 PROCEDURE — 99222 1ST HOSP IP/OBS MODERATE 55: CPT | Performed by: INTERNAL MEDICINE

## 2018-01-17 PROCEDURE — 83735 ASSAY OF MAGNESIUM: CPT

## 2018-01-17 PROCEDURE — 99232 SBSQ HOSP IP/OBS MODERATE 35: CPT | Performed by: INTERNAL MEDICINE

## 2018-01-17 PROCEDURE — 83036 HEMOGLOBIN GLYCOSYLATED A1C: CPT

## 2018-01-17 PROCEDURE — A9270 NON-COVERED ITEM OR SERVICE: HCPCS | Performed by: INTERNAL MEDICINE

## 2018-01-17 PROCEDURE — A9270 NON-COVERED ITEM OR SERVICE: HCPCS | Performed by: HOSPITALIST

## 2018-01-17 PROCEDURE — 770001 HCHG ROOM/CARE - MED/SURG/GYN PRIV*

## 2018-01-17 PROCEDURE — G8978 MOBILITY CURRENT STATUS: HCPCS | Mod: CJ

## 2018-01-17 PROCEDURE — 85027 COMPLETE CBC AUTOMATED: CPT

## 2018-01-17 PROCEDURE — 36415 COLL VENOUS BLD VENIPUNCTURE: CPT

## 2018-01-17 PROCEDURE — 84439 ASSAY OF FREE THYROXINE: CPT

## 2018-01-17 PROCEDURE — 97162 PT EVAL MOD COMPLEX 30 MIN: CPT

## 2018-01-17 RX ORDER — DONEPEZIL HYDROCHLORIDE 5 MG/1
5 TABLET, FILM COATED ORAL EVERY 24 HOURS
Status: DISCONTINUED | OUTPATIENT
Start: 2018-01-17 | End: 2018-01-31

## 2018-01-17 RX ADMIN — DONEPEZIL HYDROCHLORIDE 5 MG: 5 TABLET, FILM COATED ORAL at 17:47

## 2018-01-17 RX ADMIN — MONTELUKAST SODIUM 10 MG: 10 TABLET, COATED ORAL at 10:30

## 2018-01-17 RX ADMIN — STANDARDIZED SENNA CONCENTRATE AND DOCUSATE SODIUM 2 TABLET: 8.6; 5 TABLET, FILM COATED ORAL at 10:29

## 2018-01-17 RX ADMIN — ESCITALOPRAM OXALATE 20 MG: 10 TABLET ORAL at 10:29

## 2018-01-17 RX ADMIN — MEMANTINE HYDROCHLORIDE 10 MG: 10 TABLET ORAL at 10:29

## 2018-01-17 ASSESSMENT — COGNITIVE AND FUNCTIONAL STATUS - GENERAL
WALKING IN HOSPITAL ROOM: A LITTLE
SUGGESTED CMS G CODE MODIFIER MOBILITY: CJ
CLIMB 3 TO 5 STEPS WITH RAILING: A LITTLE
MOBILITY SCORE: 22

## 2018-01-17 ASSESSMENT — GAIT ASSESSMENTS
DISTANCE (FEET): 250
GAIT LEVEL OF ASSIST: MODERATE ASSIST
ASSISTIVE DEVICE: SINGLE POINT CANE

## 2018-01-17 ASSESSMENT — PAIN SCALES - GENERAL
PAINLEVEL_OUTOF10: 0

## 2018-01-17 NOTE — PROGRESS NOTES
Patient is alert and oriented x 3, able to make needs known. He denies any pain or discomfort at this time. Denies SOB. Denies SI/HI. All due meds taken and tolerated. Assisted with ADLs as needed. Reminded of the use of call light for needs. Bed alarm in place.

## 2018-01-17 NOTE — CARE PLAN
Problem: Safety  Goal: Will remain free from falls    Intervention: Assess risk factors for falls  Patient has dementia, but he is alert and oriented x 3 tonight. Pleasant, calm and cooperative with care. Bed alarm in place. Reminded of the use of call light for any needs.
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
## 2018-01-17 NOTE — THERAPY
"Occupational Therapy Contact Note    Attempted OT evaluation, pt tearful and agitated about his current situation. \"I'm about ready to end it\", \"just give me a shirt and a coat and I'll get out of here\". RN notified. Will attempt eval again tomorrow as appropriate.    Nazia Philippe, OTR/L  "

## 2018-01-17 NOTE — PROGRESS NOTES
"Pt is very agitated and stating he wants to go [place].  He then says \"Yes, I want to die.  I want to go and get it over with.\"      Pt requesting a t-shirt, but due to concern for safety and elopement, nursing feels it is unsafe to provide it.    RN notified Dr. Sheldon.  Continues on close observations at this time.  "

## 2018-01-17 NOTE — PROGRESS NOTES
Received pt from NOC RN.  He is teary.  He says he does not want to hurt himself, but if he can't go home he does not want to live.    He is oriented to place and situation, but does not know the date.      He REZA, and turns for pt.  No skin breakdown noted. Offered pt encouragement and reassurance about plan of care and situaiton.

## 2018-01-17 NOTE — CARE PLAN
Problem: Communication  Goal: The ability to communicate needs accurately and effectively will improve    Intervention: [place] patient and significant other/support system to call light to alert staff of needs  Patient has dementia but alert and oriented x 3 tonight. Patient was reminded of the use of call light for any needs. Verbalized understanding.

## 2018-01-17 NOTE — PROGRESS NOTES
[facility] Hospitalist Progress Note    Date of Service: 2018    Chief Complaint  82 y.o. Male w/h/o Alzheimer's, stroke  admitted 1/15/2018 after been brought to ER by wife as she cannot take care of him any more, pt also found to have hypernatremia.      Interval Problem Update  Pt seen and examined, afebrile, upset this AM after discussion with wife on the phine, refusing medication.  Geriatrics have been consulted appreciate rec.     Consultants/Specialty  Geriatrics     Disposition  TBD         Review of Systems   Unable to perform ROS: Dementia      Physical Exam  Laboratory/Imaging   Hemodynamics  Temp (24hrs), Av.7 °C (98 °F), Min:36.6 °C (97.8 °F), Max:36.8 °C (98.2 °F)   Temperature: 36.8 °C (98.2 °F)  Pulse  Av.4  Min: 59  Max: 76    Blood Pressure : 139/79      Respiratory      Respiration: 16, Pulse Oximetry: 98 %             Fluids    Intake/Output Summary (Last 24 hours) at 18 1122  Last data filed at 18 1000   Gross per 24 hour   Intake              880 ml   Output                0 ml   Net              880 ml       Nutrition  Orders Placed This Encounter   Procedures   • Diet Order     Standing Status:   Standing     Number of Occurrences:   1     Order Specific Question:   Diet:     Answer:   Regular [1]     Order Specific Question:   Texture/Fiber modifications:     Answer:   Dysphagia 3(Mechanical Soft)specify fluid consistency(question 6) [3]     Physical Exam   HENT:   Head: Normocephalic and atraumatic.   Eyes: Conjunctivae are normal. No scleral icterus.   Neck: Neck supple. No JVD present.   Cardiovascular: Normal heart sounds.  Exam reveals no gallop.    Pulmonary/Chest: Effort normal and breath sounds normal. He has no wheezes. He has no rales.   Abdominal: Soft. Bowel sounds are normal. He exhibits no distension. There is no tenderness.   Musculoskeletal: He exhibits no edema.   Neurological: He is alert.   Skin: Skin is warm and dry. No erythema.   Nursing note and  vitals reviewed.      Recent Labs      01/15/18   1814  01/16/18 0348  01/17/18   0044   WBC  7.2  5.7  5.7   RBC  4.24*  3.86*  3.88*   HEMOGLOBIN  14.2  12.9*  13.0*   HEMATOCRIT  42.6  38.6*  39.3*   MCV  100.5*  100.0*  101.3*   MCH  33.5*  33.4*  33.5*   MCHC  33.3*  33.4*  33.1*   RDW  49.1  48.0  48.6   PLATELETCT  171  149*  156*   MPV  10.8  10.8  10.7     Recent Labs      01/15/18   1814  01/16/18 0348  01/17/18   0044   SODIUM  150*  135  137   POTASSIUM  4.4  3.7  3.8   CHLORIDE  115*  104  105   CO2  25  25  22   GLUCOSE  92  75  80   BUN  20  17  12   CREATININE  0.90  0.83  0.90   CALCIUM  9.0  8.2*  8.8                      Assessment/Plan     Suicidal ideation- (present on admission)   Assessment & Plan    - Was noted to say this at home but patient does not currently have suicidal ideation  - Likely due to dementia and agitation at home has not expressed any more SI   - Not on a legal hold  - will consider psych consult if patient expresses suicidal ideation in hospital        Irritability- (present on admission)   Assessment & Plan    - Likely worsened by hypernatremia  - Treating hypernatremia  - more calm this AM         FTT (failure to thrive) in adult- (present on admission)   Assessment & Plan    - Wife unable to care for him at home  - Likely will need placement        Asymptomatic bacteriuria- (present on admission)   Assessment & Plan    - Given a dose of ceftriaxone in the ER  - Does not have a UTI  - Defer further antibiotics  - Blood and urine cultures pending        Hypernatremia- (present on admission)   Assessment & Plan    - Likely due to dehydration  - resolved after 1/2 NS         Late onset Alzheimer's disease with behavioral disturbance- (present on admission)   Assessment & Plan    - Chronic, worsening  - Continue on  donepezil, escitalopram, memantine  - Continue Xanax p.r.n.            Reviewed items::  Labs reviewed, Radiology images reviewed and Medications  reviewed  Shaw catheter::  No Shaw  DVT prophylaxis pharmacological::  Heparin

## 2018-01-18 LAB
FERRITIN SERPL-MCNC: 177.5 NG/ML (ref 22–322)
FERRITIN SERPL-MCNC: 198.1 NG/ML (ref 22–322)
FOLATE SERPL-MCNC: 17 NG/ML
FOLATE SERPL-MCNC: 21.1 NG/ML

## 2018-01-18 PROCEDURE — A9270 NON-COVERED ITEM OR SERVICE: HCPCS | Performed by: HOSPITALIST

## 2018-01-18 PROCEDURE — 700102 HCHG RX REV CODE 250 W/ 637 OVERRIDE(OP): Performed by: HOSPITALIST

## 2018-01-18 PROCEDURE — 99232 SBSQ HOSP IP/OBS MODERATE 35: CPT | Performed by: INTERNAL MEDICINE

## 2018-01-18 PROCEDURE — 700102 HCHG RX REV CODE 250 W/ 637 OVERRIDE(OP): Performed by: INTERNAL MEDICINE

## 2018-01-18 PROCEDURE — A9270 NON-COVERED ITEM OR SERVICE: HCPCS | Performed by: INTERNAL MEDICINE

## 2018-01-18 PROCEDURE — 770001 HCHG ROOM/CARE - MED/SURG/GYN PRIV*

## 2018-01-18 PROCEDURE — 82746 ASSAY OF FOLIC ACID SERUM: CPT

## 2018-01-18 PROCEDURE — 82728 ASSAY OF FERRITIN: CPT

## 2018-01-18 PROCEDURE — 700111 HCHG RX REV CODE 636 W/ 250 OVERRIDE (IP): Performed by: INTERNAL MEDICINE

## 2018-01-18 PROCEDURE — 36415 COLL VENOUS BLD VENIPUNCTURE: CPT

## 2018-01-18 RX ORDER — ERGOCALCIFEROL 1.25 MG/1
50000 CAPSULE ORAL
Status: DISCONTINUED | OUTPATIENT
Start: 2018-01-18 | End: 2018-02-13 | Stop reason: HOSPADM

## 2018-01-18 RX ORDER — POLYETHYLENE GLYCOL 3350 17 G/17G
1 POWDER, FOR SOLUTION ORAL DAILY
Status: DISCONTINUED | OUTPATIENT
Start: 2018-01-18 | End: 2018-02-13 | Stop reason: HOSPADM

## 2018-01-18 RX ORDER — CHOLECALCIFEROL (VITAMIN D3) 125 MCG
1000 CAPSULE ORAL DAILY
Status: DISCONTINUED | OUTPATIENT
Start: 2018-01-18 | End: 2018-02-13 | Stop reason: HOSPADM

## 2018-01-18 RX ADMIN — Medication 1000 MCG: at 11:26

## 2018-01-18 RX ADMIN — MEMANTINE HYDROCHLORIDE 10 MG: 10 TABLET ORAL at 20:01

## 2018-01-18 RX ADMIN — MEMANTINE HYDROCHLORIDE 10 MG: 10 TABLET ORAL at 11:27

## 2018-01-18 RX ADMIN — ACETAMINOPHEN 650 MG: 325 TABLET, FILM COATED ORAL at 11:27

## 2018-01-18 RX ADMIN — ENOXAPARIN SODIUM 40 MG: 100 INJECTION SUBCUTANEOUS at 11:27

## 2018-01-18 RX ADMIN — STANDARDIZED SENNA CONCENTRATE AND DOCUSATE SODIUM 2 TABLET: 8.6; 5 TABLET, FILM COATED ORAL at 20:01

## 2018-01-18 RX ADMIN — MONTELUKAST SODIUM 10 MG: 10 TABLET, COATED ORAL at 11:26

## 2018-01-18 RX ADMIN — ERGOCALCIFEROL 50000 UNITS: 1.25 CAPSULE ORAL at 15:39

## 2018-01-18 RX ADMIN — ESCITALOPRAM OXALATE 20 MG: 10 TABLET ORAL at 11:26

## 2018-01-18 RX ADMIN — DONEPEZIL HYDROCHLORIDE 5 MG: 5 TABLET, FILM COATED ORAL at 20:01

## 2018-01-18 ASSESSMENT — PAIN SCALES - GENERAL
PAINLEVEL_OUTOF10: 0

## 2018-01-18 ASSESSMENT — LIFESTYLE VARIABLES: REASON UNABLE TO ASSESS: CONFUSED

## 2018-01-18 NOTE — PROGRESS NOTES
"Geriatric Progress Note    Chief Complaint   Patient presents with   • Psych Eval     Today's Date: 1/18/2018  Patient Name: Lui Denise  Current Attending: Tasia Sheldon M.D.    Subjective:  24 hour Events: Initial consult. Dementia labs consistent with b12 def. Vitamin D is very low. Patient has been refusing medications. No changes in Mental Status. Refused to work with PT.     Nursing Report: He has poor memory. Otherwise no concerns. Unclear when last BM was.     Patient Report: continues to want to see his wife and children.He would like to wear a shirt.  He would like to live with them. He has some conflicting statements as he knows that his wife is moving in with her sister and that she does not want to care for him anymore. He appears to value living with family above all else. He has family in [place] and [place]. He is not sure how either of those family members feel about him, but he does not appear to be holding any ill will towards them. A nurse gave him her phone to listen to country music     Team Discussion: Discussed need to look for placement with family as that is the patient's preference. Can look at family in [place] (son) and family in [place] (two sisters). Primary MD requested psych consult today.     Medical Team:  Primary: Internal Medicine - [facility]  Consultants:  PT    Activity Level:   Light    Activities of Daily Living:   Independent  This is a reflection of patient's normal baseline    ROS: The items below were reported to be negative, unless otherwise noted above or is in bold type.   Constipation  Diarrhea  Urination  Pain  SOB  Fluctuation of mental status   Vision   Hearing  Nausea  Vomiting  Motor issues  Sensory issues  Memory issues      Objective:  Physical Exam:   /105   Pulse 82   Temp 36.6 °C (97.8 °F)   Resp 16   Ht 1.6 m (5' 3\")   Wt 62.3 kg (137 lb 5.6 oz)   SpO2 98%   BMI 24.33 kg/m²     Intake/Output Summary (Last 24 hours) at 01/18/18 1109  Last data filed " at 01/18/18 0730   Gross per 24 hour   Intake              440 ml   Output              600 ml   Net             -160 ml     General: thin/frail/appears older than stated age. Alert and oriented X 2 (person, place) able to guess jan 2018 after prompting  HEENT: neck suppple  CV: regular rate, 2+ radial and DP pulses bilaterlly  Lungs: normal effort, CAB   Hands: osteoporotic changes to bilateral hands noted with Herberben's and Toy's nodes  Ext: no c/c/e  Neurological Exam: on focal  Psych: depressed mood and anxious, perseverates on current situation and his lack of control. Redirectable, but he often returns to the subject of his family.   Delirium Screen: negative. Patient was able to say days of the week forwards and backwards    Labs: b12 -181  25vitD - 16  Mag 1.7  TSH and T4 WNL  A1c WNL.   LDL >70    Imaging: head CT with no acute findings. Old occipital infarct with dystrophic calcifications.     EKG: NSR, early positive R wave in V2, non specific EKG    Assessment: 83 y/o M with vascular dementia here for placement.       Plan:   Vascular Dementia   History of CVA  Vitamin B12 Deficiency  - see initial H/P for full details about dementia diagnosis  - will start 1000 mcg b12 for any component of memory loss that may be due to this.   - Given CT head findings would benefit from secondary prevention with initiation of low dose ASA and statin, however would hold off today as we are starting vitamin D and b12 and he is refusing a lot of treatment.   -recommend to reintroduce yourself before every interaction  -requested RN and SW get patient a shirt to wear under his gown  -low likelihood of elopement given history from wife and he has not made any attempts to leave the hospital, just threats.    -contiue donepezil 5 mg (decrease from 10 mg on admission due to history of bradycardia and falls) and memantine 10 mg BID. Unclear if these are effective. If he can find continuity of caer consider assessing for  improvement and if no improvement wean these medications.   -country music calms patient's anxiety    Anxiety   SI - resolved  will get patient shirt that may help with one of his concerns. Other concerns stem from feelings of family abandonment and new environment. Needs constat reassurance, Query if patient will get used to new environment and then the anxiety will self resolve/improve. It appears some of his anxiety stems from his memory loss. Unclear if polypharmacy with his donepezil, excitalopram and memantidine are playing a role.    -on lexapro 20 mg, unclear if it is effective  -primary team to consult psych  -country music calms patient's anxiety    Falls  Vitamin D def  -encourage mobilization with RN today  -start 80187 U weekly X 6 weeks then 1000 U daily there after  -encourage PO intake to get 1200 mg of Ca in diet daily.  -consider nutrition consult pending intake over next few days. May need protein calorie suppplement    macrocytic anemia  - likely secondary to b12 deficiency. TSH normal  - I have added on a ferritin and folate to yesterday's labs to see if these are also contributing. Unfortunately this may add to his polypharmacy.     Constipation  - no BM, will schedule PEG, does not likley need stimulant laxative, but will continue until BM.     Polypharmacy  -I am unfortunately having to add medications, as I feel they add value to his care. I do not want to abruptly stop the medications he was admitted on, as there are come concerns with adrupt cessation of these medications. Currently decreased donepezil, but have added b12 and vitamin D. I do think ASA and statin should be eventually started if he starts to be adherent, but no rush as these medications will make differences over months and years rather than days. Can likely decrease bowel regimen and assess need for montelukast over time.     disposition  -patient would like to live with family, it appears living with the wife is not an  option. Discussed with SW. She is to talk to daughter and then possibly  call son and then family in [place] to see if they are amendable to having the patient live with them. Patient has medicaid application in process per wife, this would help with group home placement if his family is unable to take care of him.   -primary goal would be to meet patient's goal of discharge with family  -attempted to call cindy Denise in [place] yesterday and today but no answer. No VM left. Number is [phone], this number was added to patient's demographics. Requst SW attempt to contact any available family.     Care giver fatigue  -referred wife to [facility].   -wife no longer able to care for her  to to psychosocial issues.     DVT Prophylaxis:  -changed to enoxaparin to limit number of dosings vs heparin. Requested Rn document number of feet walked so we can d/c this medication if he is walking >150'/day    Mobility Orders: ad becky    Delirium Prevention Recommendations: High risk for delirium. Please consider following these nursing recommendations.     Interventions to be considered in all patients in order to minimize the risk of delirium.   -do not disturb patient (vitals or lab draws) between the hours of 10 PM and 6 AM.  -ideally the patient should not sleep during the day and we should avoid day time naps.   -up in chair for meals  -ambulate at least three times daily, as able  -watch for constipation  -timed voiding - ask patient is she would like to go to the bathroom q 2-3 hours, except during the do not disturb hours.   -remove all unnecessary lines (central lines, peripheral IVs, feeding tubes, nevarez catheters)    We will continue to follow that patient along with you    Noe Chadwick M.D.  Geriatrics- [facility]  Please feel free to contact me with any questions.  Extension [phone]   8:00-5:00 Monday - Friday (excluding holidays)

## 2018-01-18 NOTE — PROGRESS NOTES
[facility] Hospitalist Progress Note    Date of Service: 2018    Chief Complaint  82 y.o. Male w/h/o Alzheimer's, stroke  admitted 1/15/2018 after been brought to ER by wife as she cannot take care of him any more, pt also found to have hypernatremia.      Interval Problem Update  Pt seen and examined, afebrile, depressed, anxious, refusing medication.  Psychiatry consulted appreciate rec.  Geriatrics also following appreciate rec.     Consultants/Specialty  Geriatrics     Disposition  TBD         Review of Systems   Unable to perform ROS: Dementia      Physical Exam  Laboratory/Imaging   Hemodynamics  Temp (24hrs), Av.6 °C (97.8 °F), Min:36.3 °C (97.3 °F), Max:36.9 °C (98.4 °F)   Temperature: 36.6 °C (97.8 °F)  Pulse  Av.1  Min: 59  Max: 82    Blood Pressure : 118/105      Respiratory      Respiration: 16, Pulse Oximetry: 98 %             Fluids    Intake/Output Summary (Last 24 hours) at 18 1227  Last data filed at 18 0730   Gross per 24 hour   Intake              440 ml   Output              600 ml   Net             -160 ml       Nutrition  Orders Placed This Encounter   Procedures   • Diet Order     Standing Status:   Standing     Number of Occurrences:   1     Order Specific Question:   Diet:     Answer:   Regular [1]     Order Specific Question:   Texture/Fiber modifications:     Answer:   Dysphagia 3(Mechanical Soft)specify fluid consistency(question 6) [3]     Physical Exam   HENT:   Head: Normocephalic and atraumatic.   Eyes: Conjunctivae are normal. No scleral icterus.   Neck: Neck supple. No JVD present.   Cardiovascular: Normal heart sounds.  Exam reveals no gallop.    Pulmonary/Chest: Effort normal and breath sounds normal. He has no wheezes. He has no rales.   Abdominal: Soft. Bowel sounds are normal. He exhibits no distension. There is no tenderness.   Musculoskeletal: He exhibits no edema.   Neurological: He is alert.   Skin: Skin is warm and dry. No erythema.   Nursing note and  vitals reviewed.      Recent Labs      01/15/18   1814  01/16/18   0348  01/17/18   0044   WBC  7.2  5.7  5.7   RBC  4.24*  3.86*  3.88*   HEMOGLOBIN  14.2  12.9*  13.0*   HEMATOCRIT  42.6  38.6*  39.3*   MCV  100.5*  100.0*  101.3*   MCH  33.5*  33.4*  33.5*   MCHC  33.3*  33.4*  33.1*   RDW  49.1  48.0  48.6   PLATELETCT  171  149*  156*   MPV  10.8  10.8  10.7     Recent Labs      01/15/18   1814  01/16/18   0348  01/17/18   0044   SODIUM  150*  135  137   POTASSIUM  4.4  3.7  3.8   CHLORIDE  115*  104  105   CO2  25  25  22   GLUCOSE  92  75  80   BUN  20  17  12   CREATININE  0.90  0.83  0.90   CALCIUM  9.0  8.2*  8.8             Recent Labs      01/17/18   1516   TRIGLYCERIDE  115   HDL  39*   LDL  82          Assessment/Plan     Suicidal ideation- (present on admission)   Assessment & Plan    - Was noted to say this at home but patient does not currently have suicidal ideation  - Likely due to dementia and agitation at home has not expressed any more SI   - Not on a legal hold  - will consider psych consult if patient expresses suicidal ideation in hospital        Irritability- (present on admission)   Assessment & Plan    - Likely worsened by hypernatremia  - Treating hypernatremia  - more calm this AM         FTT (failure to thrive) in adult- (present on admission)   Assessment & Plan    - Wife unable to care for him at home  - Likely will need placement        Asymptomatic bacteriuria- (present on admission)   Assessment & Plan    - Given a dose of ceftriaxone in the ER  - Does not have a UTI  - Defer further antibiotics  - Blood and urine cultures pending        Hypernatremia- (present on admission)   Assessment & Plan    - Likely due to dehydration  - resolved after 1/2 NS         Late onset Alzheimer's disease with behavioral disturbance- (present on admission)   Assessment & Plan    - Chronic, worsening  - Continue on  donepezil, escitalopram, memantine  - Continue Xanax p.r.n.            Reviewed  items::  Labs reviewed, Radiology images reviewed and Medications reviewed  Shaw catheter::  No Shaw  DVT prophylaxis pharmacological::  Heparin

## 2018-01-18 NOTE — CARE PLAN
Problem: Communication  Goal: The ability to communicate needs accurately and effectively will improve    Intervention: Reorient patient to environment as needed  Patient is alert with confusion. Reoriented with the use of call light and provided safety education. Hourly rounding in place.       Problem: Safety  Goal: Will remain free from injury  Patient is alert with confusion. Bed alarm in place. Reoriented to the use of call light for assistance. Patient has dementia and needs reorientation for safety.

## 2018-01-18 NOTE — THERAPY
"Physical Therapy Evaluation completed.   Bed Mobility:  Supine to Sit: Stand by Assist  Transfers: Sit to Stand: Stand by Assist  Gait: Level Of Assist: Contact Guard Assist with Single Point Cane       Plan of Care: Will benefit from Physical Therapy 3 times per week  Discharge Recommendations: Equipment: Will Continue to Assess for Equipment Needs. Post-acute therapy Discharge to a transitional care facility for continued skilled therapy services.    See \"Rehab Therapy-Acute\" Patient Summary Report for complete documentation.     Pt. presents with L knee buckling, general decreased in strength in bilateral LE,  and emotional instability limiting functional mobility.  Unable to completely assess and quantize pt. impairments due to becoming very labile during session due to wife  bringing him to hospital.  Initially, pt. did not want to participate and stated he would not unless he could physically see and talk to his wife or one of his family members.  He reports that he his family doesnt care about him so he doesnt care to live and that no one has called to check on him. Pt. very demented and nurse notified us that pt did in fact talk to his wife earlier but doesn't remember.  Pt. stated several times that he was ready to visit his son who is  and referenced that he wanted to die also if he could not go home. Pt. reports that he wanted to lie in bed until he passed.  Nurse came in and played music for him which changed his mood and pt. began to participate and was willing to ambulate.  He was able to ambulate ~500ft w/ SPC and CGA and began to joke and smile more.  Pt. will benefit from skilled acute PT services.   "

## 2018-01-18 NOTE — PROGRESS NOTES
Patient has refused all due meds tonight. Patient is noted to be irritable and agitated. He denies pain or any discomfort. Bed alarm in place. Call light within reach.

## 2018-01-18 NOTE — CARE PLAN
Problem: Communication  Goal: The ability to communicate needs accurately and effectively will improve  Outcome: PROGRESSING SLOWER THAN EXPECTED  Reoriented pt q hour.  He demonstrates severe short term memory loss and requires frequent reorientation.      Problem: Safety  Goal: Will remain free from injury  Outcome: PROGRESSING AS EXPECTED  Pt is oriented to self and situation, not date.  He has short term memory loss and often does not retain memories of conversation from earlier in the day.  This makes him very anxious and at times very angry.  He is very concerned that his wife has left him and that he has no where to go.  RN and pt tries to call his wife multiple times during the day, but she only was reached once.  At the time of our phone call, they argued about his insurance benefit, and he became more upset and agitated.  At times pt says he wants to die since he has no where to go, but he is unable to endorse a plan of how to kill himself and denies he wants to kill himself, only that he wants to die.    He uses call light, but refuses non-skid socks.  He keeps his cane in bed with him, so bed is kept at a safe position for him to decrease risk of falls.  Bed alarm remains in use due to concern for possible wandering.      RN finds that he is very responsive to country music when his agitation is great.  OT reached out to music therapy for [facility] to see if they might be able to come to his bedside and spend time with him.

## 2018-01-19 ENCOUNTER — APPOINTMENT (OUTPATIENT)
Dept: RADIOLOGY | Facility: MEDICAL CENTER | Age: 83
DRG: 057 | End: 2018-01-19
Attending: INTERNAL MEDICINE
Payer: MEDICARE

## 2018-01-19 LAB
ANION GAP SERPL CALC-SCNC: 11 MMOL/L (ref 0–11.9)
BUN SERPL-MCNC: 10 MG/DL (ref 8–22)
CALCIUM SERPL-MCNC: 8.4 MG/DL (ref 8.5–10.5)
CHLORIDE SERPL-SCNC: 104 MMOL/L (ref 96–112)
CO2 SERPL-SCNC: 20 MMOL/L (ref 20–33)
CREAT SERPL-MCNC: 0.8 MG/DL (ref 0.5–1.4)
ERYTHROCYTE [DISTWIDTH] IN BLOOD BY AUTOMATED COUNT: 47.1 FL (ref 35.9–50)
GLUCOSE SERPL-MCNC: 71 MG/DL (ref 65–99)
HCT VFR BLD AUTO: 41.4 % (ref 42–52)
HGB BLD-MCNC: 13.9 G/DL (ref 14–18)
MCH RBC QN AUTO: 33.6 PG (ref 27–33)
MCHC RBC AUTO-ENTMCNC: 33.6 G/DL (ref 33.7–35.3)
MCV RBC AUTO: 100 FL (ref 81.4–97.8)
PLATELET # BLD AUTO: 165 K/UL (ref 164–446)
PMV BLD AUTO: 10.5 FL (ref 9–12.9)
POTASSIUM SERPL-SCNC: 3.6 MMOL/L (ref 3.6–5.5)
RBC # BLD AUTO: 4.14 M/UL (ref 4.7–6.1)
SODIUM SERPL-SCNC: 135 MMOL/L (ref 135–145)
WBC # BLD AUTO: 6 K/UL (ref 4.8–10.8)

## 2018-01-19 PROCEDURE — G8987 SELF CARE CURRENT STATUS: HCPCS | Mod: CJ

## 2018-01-19 PROCEDURE — 99232 SBSQ HOSP IP/OBS MODERATE 35: CPT | Performed by: INTERNAL MEDICINE

## 2018-01-19 PROCEDURE — 770001 HCHG ROOM/CARE - MED/SURG/GYN PRIV*

## 2018-01-19 PROCEDURE — 700102 HCHG RX REV CODE 250 W/ 637 OVERRIDE(OP): Performed by: HOSPITALIST

## 2018-01-19 PROCEDURE — A9270 NON-COVERED ITEM OR SERVICE: HCPCS | Performed by: INTERNAL MEDICINE

## 2018-01-19 PROCEDURE — 70450 CT HEAD/BRAIN W/O DYE: CPT

## 2018-01-19 PROCEDURE — 700102 HCHG RX REV CODE 250 W/ 637 OVERRIDE(OP): Performed by: INTERNAL MEDICINE

## 2018-01-19 PROCEDURE — 700111 HCHG RX REV CODE 636 W/ 250 OVERRIDE (IP): Performed by: INTERNAL MEDICINE

## 2018-01-19 PROCEDURE — 80048 BASIC METABOLIC PNL TOTAL CA: CPT

## 2018-01-19 PROCEDURE — 97166 OT EVAL MOD COMPLEX 45 MIN: CPT

## 2018-01-19 PROCEDURE — 85027 COMPLETE CBC AUTOMATED: CPT

## 2018-01-19 PROCEDURE — G8988 SELF CARE GOAL STATUS: HCPCS | Mod: CI

## 2018-01-19 PROCEDURE — A9270 NON-COVERED ITEM OR SERVICE: HCPCS | Performed by: PSYCHIATRY & NEUROLOGY

## 2018-01-19 PROCEDURE — 36415 COLL VENOUS BLD VENIPUNCTURE: CPT

## 2018-01-19 PROCEDURE — 700102 HCHG RX REV CODE 250 W/ 637 OVERRIDE(OP): Performed by: PSYCHIATRY & NEUROLOGY

## 2018-01-19 PROCEDURE — A9270 NON-COVERED ITEM OR SERVICE: HCPCS | Performed by: HOSPITALIST

## 2018-01-19 PROCEDURE — 99231 SBSQ HOSP IP/OBS SF/LOW 25: CPT | Performed by: INTERNAL MEDICINE

## 2018-01-19 RX ORDER — POLYETHYLENE GLYCOL 3350 17 G/17G
1 POWDER, FOR SOLUTION ORAL
Status: DISCONTINUED | OUTPATIENT
Start: 2018-01-19 | End: 2018-02-13 | Stop reason: HOSPADM

## 2018-01-19 RX ORDER — AMOXICILLIN 250 MG
1 CAPSULE ORAL 2 TIMES DAILY PRN
Status: DISCONTINUED | OUTPATIENT
Start: 2018-01-19 | End: 2018-02-13 | Stop reason: HOSPADM

## 2018-01-19 RX ORDER — BISACODYL 10 MG
10 SUPPOSITORY, RECTAL RECTAL
Status: DISCONTINUED | OUTPATIENT
Start: 2018-01-19 | End: 2018-02-13 | Stop reason: HOSPADM

## 2018-01-19 RX ORDER — ARIPIPRAZOLE 2 MG/1
2 TABLET ORAL DAILY
Status: DISCONTINUED | OUTPATIENT
Start: 2018-01-19 | End: 2018-02-13 | Stop reason: HOSPADM

## 2018-01-19 RX ADMIN — MEMANTINE HYDROCHLORIDE 10 MG: 10 TABLET ORAL at 20:16

## 2018-01-19 RX ADMIN — MEMANTINE HYDROCHLORIDE 10 MG: 10 TABLET ORAL at 08:45

## 2018-01-19 RX ADMIN — ARIPIPRAZOLE 2 MG: 2 TABLET ORAL at 20:17

## 2018-01-19 RX ADMIN — ENOXAPARIN SODIUM 40 MG: 100 INJECTION SUBCUTANEOUS at 08:45

## 2018-01-19 RX ADMIN — MONTELUKAST SODIUM 10 MG: 10 TABLET, COATED ORAL at 08:45

## 2018-01-19 RX ADMIN — ESCITALOPRAM OXALATE 20 MG: 10 TABLET ORAL at 08:45

## 2018-01-19 RX ADMIN — DONEPEZIL HYDROCHLORIDE 5 MG: 5 TABLET, FILM COATED ORAL at 20:16

## 2018-01-19 RX ADMIN — ACETAMINOPHEN 650 MG: 325 TABLET, FILM COATED ORAL at 08:45

## 2018-01-19 RX ADMIN — Medication 1000 MCG: at 08:45

## 2018-01-19 ASSESSMENT — PAIN SCALES - GENERAL: PAINLEVEL_OUTOF10: 0

## 2018-01-19 ASSESSMENT — COGNITIVE AND FUNCTIONAL STATUS - GENERAL
DRESSING REGULAR LOWER BODY CLOTHING: A LITTLE
HELP NEEDED FOR BATHING: A LITTLE
PERSONAL GROOMING: A LITTLE
TOILETING: A LITTLE
DRESSING REGULAR UPPER BODY CLOTHING: A LITTLE
DAILY ACTIVITIY SCORE: 19
SUGGESTED CMS G CODE MODIFIER DAILY ACTIVITY: CK

## 2018-01-19 ASSESSMENT — PATIENT HEALTH QUESTIONNAIRE - PHQ9
2. FEELING DOWN, DEPRESSED, IRRITABLE, OR HOPELESS: NOT AT ALL
SUM OF ALL RESPONSES TO PHQ9 QUESTIONS 1 AND 2: 0
SUM OF ALL RESPONSES TO PHQ QUESTIONS 1-9: 0
1. LITTLE INTEREST OR PLEASURE IN DOING THINGS: NOT AT ALL

## 2018-01-19 ASSESSMENT — ACTIVITIES OF DAILY LIVING (ADL): TOILETING: INDEPENDENT
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
## 2018-01-19 NOTE — DISCHARGE PLANNING
"Medical Social Work    SW Supervisor reviewed pt's case.  Pt is an 83 y/o male admitted on 1/15/18 for a UTI.  Pt was brought to the ER for psychiatric evaluation.  Pt has hx of dementia and spouse, Brenda is pt's primary caregiver.  Per chart, pt overheard his spouse speaking to a care facility prior to this admission on the phone and threatened to \"end it all\" , resulting in the request for a psych eval. Pt has been evaluated by Geriatric Physician and pt has stated he is agreeable to placement but would like to be involved in the process.  Pt is alert and confused however.   Supervisor placed call to spouse, Brenda ([phone]) and left message requesting return phone call to complete assessment and discuss SNF placement/discharge options.  Pt has [employer] insurance.     Plan: Awaiting return call from spouse to complete assessment and discuss d/c plan.    "

## 2018-01-19 NOTE — CARE PLAN
Problem: Nutritional:  Goal: Achieve adequate nutritional intake.  Patient will consume >50% of meals.   Outcome: MET Date Met: 01/19/18  PO intake has improved. Yesterday pt ate % breakfast, 25-50% lunch and % dinner.  Today pt ate % of breakfast.

## 2018-01-19 NOTE — THERAPY
"Occupational Therapy Treatment completed with focus on ADLs and ADL transfers.  Functional Status:  Pt. is an 82 y.o. male admitted secondary to AMS. He demonstrates poor safety awareness and impulsivitity. Performed toileting ADL with CGA. Very concerned that he does not know where his wife is throughout eval. Not safe to live alone.   Plan of Care: Will benefit from Occupational Therapy 3 times per week  Discharge Recommendations:  . Post-acute therapy Discharge to a transitional care facility for continued skilled therapy services.    See \"Rehab Therapy-Acute\" Patient Summary Report for complete documentation.   "

## 2018-01-19 NOTE — PROGRESS NOTES
Geriatric Progress Note    Chief Complaint   Patient presents with   • Psych Eval     Today's Date: 1/19/2018  Patient Name: Lui Denise  Current Attending: Tasia Sheldon M.D.    Subjective:  24 hour Events: Patient has increasing adherence to med regimen. DVT ppx changed to enoxaparin. Added vitamin D and b12 to meds.  No other acute medical changes.     Nursing Report: His behavior improved with giving patient a shirt. Patient's wife came by yesterday and they did not get a along. Having regular BMs. He refused to get up yesterday to walkk.     Patient Report: patient does not recall talking to his wife yesterday. He says he recognizes us. He knows he needs to ask before getting up to go to the bathroom. He had a BM this AM. He mentions wanting to be involved in the decision on where he is going, but would like to talk to his wife. He continues to feel abanded. He did not mention wanting to live with his children and was okay considering other d/c options. He will continue to take meds. He is open for a walk today and we walked about around the entire neuro unit today. The pateint has taken asa before and did not recall any detrimental effects.     Team Discussion: Discussed with RN to continue to mobilize. He enjoys country music (RN gave patient phone to listen again today), he also seems to enjoy talking about cars and whenever he gets agitated this may be a good venue to curb any issues.  Called pharmacy about considering taper on his donepezil and memantine, however there is no way to follow him clinically as his care team changes very often.       Medical Team:  Primary: Internal Medicine - [facility]  Consultants:  PT    Activity Level:   Light    Activities of Daily Living:   Independent      ROS: The items below were reported to be negative, unless otherwise noted above or is in bold type.   Constipation  Diarrhea  Urination  Pain  SOB  Fluctuation of mental status   Vision   Hearing  Nausea  Vomiting  Motor  "issues walks with cane in left hand due to right leg contractinga t times.   Sensory issues  Memory issues      Objective:  Physical Exam:   /82   Pulse 77   Temp 36.5 °C (97.7 °F)   Resp 18   Ht 1.6 m (5' 3\")   Wt 62.3 kg (137 lb 5.6 oz)   SpO2 95%   BMI 24.33 kg/m²     Intake/Output Summary (Last 24 hours) at 01/19/18 1004  Last data filed at 01/19/18 0600   Gross per 24 hour   Intake                0 ml   Output             1525 ml   Net            -1525 ml     General: thin/frail/appears older than stated age. Alert and oriented X to self, person, year, month and place. Not well oriented to situation  HEENT: neck supple  CV: RRR no murmur  Lungs: normal effort, CAB from posterior auscultion   Hands: osteoporotic changes to bilateral hands noted with Herberben's and Toy's nodes  Back: kyphosis noted, no CVA tenderness  Ext: no c/c/e  Neurological Exam: non focal  Psych: depressed, denies SI. Anxious, perseverates on current situation and his lack of control. Redirectable, but he often returns to the subject of his family.    Gait: stable from previous exams, walked around entire neuro unit.     Delirium Screen: negative. Patient was able to say days of the week forwards and backwards      Labs: normal folate and ferritin, cbc and bmp stable    Imaging: none new    EKG: NA    Assessment: 83 y/o male with vascular dementia here with       Plan:   Vascular dementia  B12 deficiency  -on replacement b12  -recommend starting asa for secondary prevention and perhaps a statin after that.   -admitted on donepezil 10 mg daily and memantine 10 mg BID. Decrease donepezil to 5 mg due to falls and relative bradycardia, unclear if either of these benefit.   -will follow patient peripherally to assess need for these medications and taper dose if appropriate. Placing a taper in the MAR without clinical f/u of cognitive status does not seem appropriate.     Fall  Vitamin D def  -replacement ordered  -mobilizing " patient will decrease fall risk  -rec bed at a functional height, not lowest position.     Situational depression  Anxiety d/o  Depression  SI - resolved.   -mood improved now that he can wear his own shirt.   -on escitalopram, recommend psych consult as this medication appears ineffective for his deprssion.   -holding alprazolam, however per wife report the patient did not suffer obviously deleterious  side effects. Do not generally recommend use of benzos for anxiety or depression in the elderly.     Macrocytic anemia  Seasonal Allergies - on montelukast    Bowel Regimen  - no e/o of constipation, modified bowel regimen to minimize stimulant laxatives and schedule PEG as this will keep his stools hydrated. Need to monitor and adjust bowel regimen as needed.       Disposition - patient more open to other living options.  Request SW have family meeting with any available members (they can phone conference) and if they cannot care for him to consider other discharge options he is amendable to. He needs support. He needs a health care proxy. He said he wishes physicians to make decision on his behalf, if he cannot, he does not feel like he can trust any family member. He said his code status depends on how he feels that day, as such I did not change his code status in the chart, as his situation depression may be playing a role into his choice.       Mobility Orders: up ad becky    Delirium Prevention Recommendations: Continue to reorient patient and allow him to mobilize. He can communicate his needs well. His dementia requires some reorientation     We will continue to follow that patient peripherally  Noe Chadwick M.D.  Geriatrics - [facility]  Please feel free to contact me with any questions.  Extension [phone]   8:00-5:00 Monday - Friday (excluding holidays)

## 2018-01-19 NOTE — CARE PLAN
Problem: Communication  Goal: The ability to communicate needs accurately and effectively will improve  Outcome: PROGRESSING AS EXPECTED      Problem: Safety  Goal: Will remain free from injury  Outcome: PROGRESSING AS EXPECTED      Problem: Psychosocial Needs:  Goal: Level of anxiety will decrease  Outcome: PROGRESSING SLOWER THAN EXPECTED  Frequent reorientation and redirection needed. Fixating on family and financial situation.    Problem: Mobility  Goal: Risk for activity intolerance will decrease  Outcome: PROGRESSING AS EXPECTED

## 2018-01-19 NOTE — CARE PLAN
Problem: Communication  Goal: The ability to communicate needs accurately and effectively will improve    Intervention: [place] patient and significant other/support system to call light to alert staff of needs  Patient is alert and confused. Patient has dementia and forgets to use the call light. Reoriented for it's use. Hourly rounding in place.       Problem: Safety  Goal: Will remain free from falls    Intervention: Assess risk factors for falls  Patient has bed alarm in place. Reoriented of the use of call light.

## 2018-01-19 NOTE — PROGRESS NOTES
[facility] Hospitalist Progress Note    Date of Service: 2018    Chief Complaint  82 y.o. Male w/h/o Alzheimer's, stroke  admitted 1/15/2018 after been brought to ER by wife as she cannot take care of him any more, pt also found to have hypernatremia.      Interval Problem Update  Pt seen and examined, afebrile, more open today to other placement options, no overnight events.    Consultants/Specialty  Geriatrics   Psychiatry    Disposition  TBD         Review of Systems   Unable to perform ROS: Dementia      Physical Exam  Laboratory/Imaging   Hemodynamics  Temp (24hrs), Av.8 °C (98.2 °F), Min:36.5 °C (97.7 °F), Max:37.1 °C (98.7 °F)   Temperature:  (pt refused vitals at this time)  Pulse  Av.6  Min: 59  Max: 82    Blood Pressure :  (pt refused at this time)      Respiratory      Respiration: 18, Pulse Oximetry:  (pt refused at this time)             Fluids    Intake/Output Summary (Last 24 hours) at 18 1417  Last data filed at 18 0600   Gross per 24 hour   Intake                0 ml   Output             1525 ml   Net            -1525 ml       Nutrition  Orders Placed This Encounter   Procedures   • Diet Order     Standing Status:   Standing     Number of Occurrences:   1     Order Specific Question:   Diet:     Answer:   Regular [1]     Order Specific Question:   Texture/Fiber modifications:     Answer:   Dysphagia 3(Mechanical Soft)specify fluid consistency(question 6) [3]     Physical Exam   HENT:   Head: Normocephalic and atraumatic.   Eyes: Conjunctivae are normal. No scleral icterus.   Neck: Neck supple. No JVD present.   Cardiovascular: Normal heart sounds.  Exam reveals no gallop.    Pulmonary/Chest: Effort normal and breath sounds normal. He has no wheezes. He has no rales.   Abdominal: Soft. Bowel sounds are normal. He exhibits no distension. There is no tenderness.   Musculoskeletal: He exhibits no edema.   Neurological: He is alert.   Skin: Skin is warm and dry. No erythema.    Nursing note and vitals reviewed.      Recent Labs      01/17/18   0044  01/19/18   0330   WBC  5.7  6.0   RBC  3.88*  4.14*   HEMOGLOBIN  13.0*  13.9*   HEMATOCRIT  39.3*  41.4*   MCV  101.3*  100.0*   MCH  33.5*  33.6*   MCHC  33.1*  33.6*   RDW  48.6  47.1   PLATELETCT  156*  165   MPV  10.7  10.5     Recent Labs      01/17/18   0044  01/19/18   0330   SODIUM  137  135   POTASSIUM  3.8  3.6   CHLORIDE  105  104   CO2  22  20   GLUCOSE  80  71   BUN  12  10   CREATININE  0.90  0.80   CALCIUM  8.8  8.4*             Recent Labs      01/17/18   1516   TRIGLYCERIDE  115   HDL  39*   LDL  82          Assessment/Plan     Suicidal ideation- (present on admission)   Assessment & Plan    - Was noted to say this at home but patient does not currently have suicidal ideation  - Likely due to dementia and agitation at home has not expressed any more SI   - depressed and anxious, psychiatry consulted appreciate rec.         Irritability- (present on admission)   Assessment & Plan    - Likely worsened by hypernatremia  - Treating hypernatremia  - more calm         FTT (failure to thrive) in adult- (present on admission)   Assessment & Plan    - Wife unable to care for him at home  - Likely will need placement        Asymptomatic bacteriuria- (present on admission)   Assessment & Plan    - Given a dose of ceftriaxone in the ER  - Does not have a UTI  - Defer further antibiotics  - Blood and urine cultures pending        Hypernatremia- (present on admission)   Assessment & Plan    - Likely due to dehydration  - resolved after 1/2 NS         Late onset Alzheimer's disease with behavioral disturbance- (present on admission)   Assessment & Plan    - Chronic, worsening  - Continue on  donepezil, escitalopram, memantine  - Continue Xanax p.r.n.            Reviewed items::  Labs reviewed, Radiology images reviewed and Medications reviewed  Shaw catheter::  No Shaw  DVT prophylaxis pharmacological::  Heparin

## 2018-01-19 NOTE — PROGRESS NOTES
Patient is alert and confused. Reoriented as needed. Has episodes of agitation, was able to calm down after sitting in and talking. He took all his night medicine. He denies any pain or discomfort at this time. Bed alarm in place. Call light within reach.

## 2018-01-20 ENCOUNTER — APPOINTMENT (OUTPATIENT)
Dept: RADIOLOGY | Facility: MEDICAL CENTER | Age: 83
DRG: 057 | End: 2018-01-20
Attending: INTERNAL MEDICINE
Payer: MEDICARE

## 2018-01-20 PROBLEM — H54.62 VISION LOSS OF LEFT EYE: Status: ACTIVE | Noted: 2018-01-20

## 2018-01-20 LAB
BACTERIA BLD CULT: NORMAL
BACTERIA BLD CULT: NORMAL
SIGNIFICANT IND 70042: NORMAL
SIGNIFICANT IND 70042: NORMAL
SITE SITE: NORMAL
SITE SITE: NORMAL
SOURCE SOURCE: NORMAL
SOURCE SOURCE: NORMAL

## 2018-01-20 PROCEDURE — 700102 HCHG RX REV CODE 250 W/ 637 OVERRIDE(OP): Performed by: INTERNAL MEDICINE

## 2018-01-20 PROCEDURE — 700111 HCHG RX REV CODE 636 W/ 250 OVERRIDE (IP): Performed by: INTERNAL MEDICINE

## 2018-01-20 PROCEDURE — A9270 NON-COVERED ITEM OR SERVICE: HCPCS | Performed by: INTERNAL MEDICINE

## 2018-01-20 PROCEDURE — 770001 HCHG ROOM/CARE - MED/SURG/GYN PRIV*

## 2018-01-20 PROCEDURE — 700102 HCHG RX REV CODE 250 W/ 637 OVERRIDE(OP): Performed by: PSYCHIATRY & NEUROLOGY

## 2018-01-20 PROCEDURE — A9270 NON-COVERED ITEM OR SERVICE: HCPCS | Performed by: HOSPITALIST

## 2018-01-20 PROCEDURE — 700102 HCHG RX REV CODE 250 W/ 637 OVERRIDE(OP): Performed by: HOSPITALIST

## 2018-01-20 PROCEDURE — 70551 MRI BRAIN STEM W/O DYE: CPT

## 2018-01-20 PROCEDURE — A9270 NON-COVERED ITEM OR SERVICE: HCPCS | Performed by: PSYCHIATRY & NEUROLOGY

## 2018-01-20 PROCEDURE — 99232 SBSQ HOSP IP/OBS MODERATE 35: CPT | Performed by: INTERNAL MEDICINE

## 2018-01-20 RX ADMIN — Medication 1000 MCG: at 08:25

## 2018-01-20 RX ADMIN — MONTELUKAST SODIUM 10 MG: 10 TABLET, COATED ORAL at 08:25

## 2018-01-20 RX ADMIN — MEMANTINE HYDROCHLORIDE 10 MG: 10 TABLET ORAL at 20:20

## 2018-01-20 RX ADMIN — ESCITALOPRAM OXALATE 20 MG: 10 TABLET ORAL at 08:25

## 2018-01-20 RX ADMIN — POLYETHYLENE GLYCOL 3350 1 PACKET: 17 POWDER, FOR SOLUTION ORAL at 08:25

## 2018-01-20 RX ADMIN — DONEPEZIL HYDROCHLORIDE 5 MG: 5 TABLET, FILM COATED ORAL at 20:20

## 2018-01-20 RX ADMIN — ARIPIPRAZOLE 2 MG: 2 TABLET ORAL at 08:25

## 2018-01-20 RX ADMIN — MEMANTINE HYDROCHLORIDE 10 MG: 10 TABLET ORAL at 08:25

## 2018-01-20 ASSESSMENT — PAIN SCALES - GENERAL
PAINLEVEL_OUTOF10: 0
PAINLEVEL_OUTOF10: 0

## 2018-01-20 NOTE — PROGRESS NOTES
[facility] Hospitalist Progress Note    Date of Service: 2018    Chief Complaint  82 y.o. Male w/h/o Alzheimer's, stroke  admitted 1/15/2018 after been brought to ER by wife as she cannot take care of him any more, pt also found to have hypernatremia.      Interval Problem Update  Pt seen and examined, afebrile, vision loss left side pt unsure if this is old, states doesn't remember??. CT head neg , will check a MRI brain     Consultants/Specialty  Geriatrics   Psychiatry    Disposition  TBD         Review of Systems   Unable to perform ROS: Dementia      Physical Exam  Laboratory/Imaging   Hemodynamics  Temp (24hrs), Av.4 °C (97.5 °F), Min:36 °C (96.8 °F), Max:36.7 °C (98 °F)   Temperature: 36.7 °C (98 °F)  Pulse  Av  Min: 59  Max: 87    Blood Pressure : (!) 99/69      Respiratory      Respiration: 16, Pulse Oximetry: 96 %             Fluids  No intake or output data in the 24 hours ending 18 1052    Nutrition  Orders Placed This Encounter   Procedures   • Diet Order     Standing Status:   Standing     Number of Occurrences:   1     Order Specific Question:   Diet:     Answer:   Regular [1]     Order Specific Question:   Texture/Fiber modifications:     Answer:   Dysphagia 3(Mechanical Soft)specify fluid consistency(question 6) [3]     Physical Exam   HENT:   Head: Normocephalic and atraumatic.   Eyes: Conjunctivae are normal. No scleral icterus.   Neck: Neck supple. No JVD present.   Cardiovascular: Normal heart sounds.  Exam reveals no gallop.    Pulmonary/Chest: Effort normal and breath sounds normal. He has no wheezes. He has no rales.   Abdominal: Soft. Bowel sounds are normal. He exhibits no distension. There is no tenderness.   Musculoskeletal: He exhibits no edema.   Neurological: He is alert.   Skin: Skin is warm and dry. No erythema.   Nursing note and vitals reviewed.      Recent Labs      18   0330   WBC  6.0   RBC  4.14*   HEMOGLOBIN  13.9*   HEMATOCRIT  41.4*   MCV  100.0*   MCH   33.6*   MCHC  33.6*   RDW  47.1   PLATELETCT  165   MPV  10.5     Recent Labs      01/19/18   0330   SODIUM  135   POTASSIUM  3.6   CHLORIDE  104   CO2  20   GLUCOSE  71   BUN  10   CREATININE  0.80   CALCIUM  8.4*             Recent Labs      01/17/18   1516   TRIGLYCERIDE  115   HDL  39*   LDL  82          Assessment/Plan     Vision loss of left eye   Assessment & Plan    CT head neg, will check a MRI brain  Monitor         Suicidal ideation- (present on admission)   Assessment & Plan    - Was noted to say this at home but patient does not currently have suicidal ideation  - Likely due to dementia and agitation at home has not expressed any more SI   - depressed and anxious, psychiatry consulted appreciate rec.         Irritability- (present on admission)   Assessment & Plan    - Likely worsened by hypernatremia  - Treating hypernatremia  - more calm         FTT (failure to thrive) in adult- (present on admission)   Assessment & Plan    - Wife unable to care for him at home  - Likely will need placement        Asymptomatic bacteriuria- (present on admission)   Assessment & Plan    - Given a dose of ceftriaxone in the ER  - Does not have a UTI  - Defer further antibiotics  - Blood and urine cultures pending        Hypernatremia- (present on admission)   Assessment & Plan    - Likely due to dehydration  - resolved after 1/2 NS         Late onset Alzheimer's disease with behavioral disturbance- (present on admission)   Assessment & Plan    - Chronic, worsening  - Continue on  donepezil, escitalopram, memantine  - Continue Xanax p.r.n.            Reviewed items::  Labs reviewed, Radiology images reviewed and Medications reviewed  Shaw catheter::  No Shaw  DVT prophylaxis pharmacological::  Heparin

## 2018-01-20 NOTE — ASSESSMENT & PLAN NOTE
CT head neg, MRI brain - no acute CVA, evid for prior stroke.  No eye pain or signs of infxn  Continue asa, statin

## 2018-01-20 NOTE — CARE PLAN
Problem: Psychosocial Needs:  Goal: Level of anxiety will decrease    Intervention: Collaborate with Interdisciplinary Team including Psychologist/Behavioral Health Team  Goal met. Psych already evaluated patient.

## 2018-01-20 NOTE — PROGRESS NOTES
Patient reporting decreased vision in left eye. Upon assessment, patient does not have peripheral vision on left side. No other new neuro deficits. No n/t, facial droop, dysphagia, dysarthria or increased confusion. Patient follows directions. VSS. Page sent out to Dr. Sheldon. Will continue to monitor..

## 2018-01-20 NOTE — PROGRESS NOTES
"Pt is alert and oriented x2 to self and place. Pt is able to chambers & fc. Pt complains of visionary problems. Pt states that he can not see of the L eye. Pt is able to track finger on R side but unable to on L. Pt states \"everything is blurry with L eye\". Bed alarm on. Pt uses cane. PERRLA- 3 mm. Pt does not complain of pain at this time. Pt is stable at this time.   "

## 2018-01-20 NOTE — CARE PLAN
Problem: Communication  Goal: The ability to communicate needs accurately and effectively will improve  Outcome: MET Date Met: 01/20/18  Pt communicates needs throughout shift    Problem: Safety  Goal: Will remain free from injury  Outcome: MET Date Met: 01/20/18  Pt remains free of injury

## 2018-01-21 PROCEDURE — 99232 SBSQ HOSP IP/OBS MODERATE 35: CPT | Performed by: INTERNAL MEDICINE

## 2018-01-21 PROCEDURE — 700111 HCHG RX REV CODE 636 W/ 250 OVERRIDE (IP): Performed by: INTERNAL MEDICINE

## 2018-01-21 PROCEDURE — 700102 HCHG RX REV CODE 250 W/ 637 OVERRIDE(OP): Performed by: INTERNAL MEDICINE

## 2018-01-21 PROCEDURE — 770001 HCHG ROOM/CARE - MED/SURG/GYN PRIV*

## 2018-01-21 PROCEDURE — A9270 NON-COVERED ITEM OR SERVICE: HCPCS | Performed by: HOSPITALIST

## 2018-01-21 PROCEDURE — 700102 HCHG RX REV CODE 250 W/ 637 OVERRIDE(OP): Performed by: HOSPITALIST

## 2018-01-21 PROCEDURE — A9270 NON-COVERED ITEM OR SERVICE: HCPCS | Performed by: INTERNAL MEDICINE

## 2018-01-21 RX ORDER — ZIPRASIDONE MESYLATE 20 MG/ML
10 INJECTION, POWDER, LYOPHILIZED, FOR SOLUTION INTRAMUSCULAR EVERY 4 HOURS PRN
Status: DISCONTINUED | OUTPATIENT
Start: 2018-01-21 | End: 2018-01-23

## 2018-01-21 RX ADMIN — MEMANTINE HYDROCHLORIDE 10 MG: 10 TABLET ORAL at 20:54

## 2018-01-21 RX ADMIN — ZIPRASIDONE MESYLATE 10 MG: 20 INJECTION, POWDER, LYOPHILIZED, FOR SOLUTION INTRAMUSCULAR at 21:58

## 2018-01-21 RX ADMIN — DONEPEZIL HYDROCHLORIDE 5 MG: 5 TABLET, FILM COATED ORAL at 20:54

## 2018-01-21 ASSESSMENT — PAIN SCALES - GENERAL
PAINLEVEL_OUTOF10: 0

## 2018-01-21 NOTE — CARE PLAN
Problem: Safety  Goal: Will remain free from falls  Outcome: PROGRESSING AS EXPECTED  Assessed the patient for fall risk factors. Provided education regarding the need to call for assistance. Bed alarm in place, bed in lowest position, call light in reach.      Problem: Psychosocial Needs:  Goal: Level of anxiety will decrease  Outcome: PROGRESSING AS EXPECTED  Assessed pt's mental status.  Reoriented pt as needed.

## 2018-01-21 NOTE — PROGRESS NOTES
Pt is AAOx2.  Moves all extremities 4/5.  Denies numbness or tingling.  Up with standby assist, steady gait.  Pt complains of no pain.  Pt denies nausea/vomiting.  Tolerating regular diet.  Pt voiding without difficulty.   POC discussed, pt verbalizes understanding.  Bed alarm on.  Call light within reach, bed in lowest position.

## 2018-01-21 NOTE — PROGRESS NOTES
[facility] Hospitalist Progress Note    Date of Service: 2018    Chief Complaint  82 y.o. Male w/h/o Alzheimer's, stroke  admitted 1/15/2018 after been brought to ER by wife as she cannot take care of him any more, pt also found to have hypernatremia.      Interval Problem Update  Pt seen and examined, afebrile,no overnight events, MRI brain done yesterday showed chronic infarct , but no acute.     Consultants/Specialty  Geriatrics   Psychiatry    Disposition  TBD         Review of Systems   Unable to perform ROS: Dementia      Physical Exam  Laboratory/Imaging   Hemodynamics  Temp (24hrs), Av.6 °C (97.8 °F), Min:36.3 °C (97.4 °F), Max:36.7 °C (98.1 °F)   Temperature: 36.3 °C (97.4 °F)  Pulse  Av.8  Min: 59  Max: 87    Blood Pressure : 110/74      Respiratory      Respiration: 18, Pulse Oximetry: 98 %             Fluids    Intake/Output Summary (Last 24 hours) at 18 1243  Last data filed at 18 0600   Gross per 24 hour   Intake              120 ml   Output                0 ml   Net              120 ml       Nutrition  Orders Placed This Encounter   Procedures   • Diet Order     Standing Status:   Standing     Number of Occurrences:   1     Order Specific Question:   Diet:     Answer:   Regular [1]     Order Specific Question:   Texture/Fiber modifications:     Answer:   Dysphagia 3(Mechanical Soft)specify fluid consistency(question 6) [3]     Physical Exam   HENT:   Head: Normocephalic and atraumatic.   Eyes: Conjunctivae are normal. No scleral icterus.   Neck: Neck supple. No JVD present.   Cardiovascular: Normal heart sounds.  Exam reveals no gallop.    Pulmonary/Chest: Effort normal and breath sounds normal. He has no wheezes. He has no rales.   Abdominal: Soft. Bowel sounds are normal. He exhibits no distension. There is no tenderness.   Musculoskeletal: He exhibits no edema.   Neurological: He is alert.   Skin: Skin is warm and dry. No erythema.   Nursing note and vitals reviewed.       Recent Labs      01/19/18   0330   WBC  6.0   RBC  4.14*   HEMOGLOBIN  13.9*   HEMATOCRIT  41.4*   MCV  100.0*   MCH  33.6*   MCHC  33.6*   RDW  47.1   PLATELETCT  165   MPV  10.5     Recent Labs      01/19/18   0330   SODIUM  135   POTASSIUM  3.6   CHLORIDE  104   CO2  20   GLUCOSE  71   BUN  10   CREATININE  0.80   CALCIUM  8.4*                      Assessment/Plan     Vision loss of left eye   Assessment & Plan    CT head neg, will check a MRI brain also negative for CVA  Monitor         Suicidal ideation- (present on admission)   Assessment & Plan    - Was noted to say this at home but patient does not currently have suicidal ideation  - Likely due to dementia and agitation at home has not expressed any more SI   - depressed and anxious, psychiatry consulted appreciate rec.         Irritability- (present on admission)   Assessment & Plan    - Likely worsened by hypernatremia  - Treating hypernatremia  - more calm         FTT (failure to thrive) in adult- (present on admission)   Assessment & Plan    - Wife unable to care for him at home  - Likely will need placement        Asymptomatic bacteriuria- (present on admission)   Assessment & Plan    - Given a dose of ceftriaxone in the ER  - Does not have a UTI  - Defer further antibiotics  - Blood and urine cultures pending        Hypernatremia- (present on admission)   Assessment & Plan    - Likely due to dehydration  - resolved after 1/2 NS         Late onset Alzheimer's disease with behavioral disturbance- (present on admission)   Assessment & Plan    - Chronic, worsening  - Continue on  donepezil, escitalopram, memantine  - Continue Xanax p.r.n.            Reviewed items::  Labs reviewed, Radiology images reviewed and Medications reviewed  Shaw catheter::  No Shaw  DVT prophylaxis pharmacological::  Heparin

## 2018-01-21 NOTE — CARE PLAN
Problem: Safety  Goal: Will remain free from falls  Outcome: MET Date Met: 01/21/18  Pt remains safe throughout shift     Problem: Mobility  Goal: Risk for activity intolerance will decrease  Outcome: MET Date Met: 01/21/18  Pt increases activity throughout shift     Problem: Pain Management  Goal: Pain level will decrease to patient's comfort goal  Outcome: MET Date Met: 01/21/18  Pt pain level at goal

## 2018-01-22 PROCEDURE — 700102 HCHG RX REV CODE 250 W/ 637 OVERRIDE(OP): Performed by: INTERNAL MEDICINE

## 2018-01-22 PROCEDURE — A9270 NON-COVERED ITEM OR SERVICE: HCPCS | Performed by: HOSPITALIST

## 2018-01-22 PROCEDURE — 770006 HCHG ROOM/CARE - MED/SURG/GYN SEMI*

## 2018-01-22 PROCEDURE — 700105 HCHG RX REV CODE 258: Performed by: INTERNAL MEDICINE

## 2018-01-22 PROCEDURE — 700102 HCHG RX REV CODE 250 W/ 637 OVERRIDE(OP): Performed by: HOSPITALIST

## 2018-01-22 PROCEDURE — A9270 NON-COVERED ITEM OR SERVICE: HCPCS | Performed by: INTERNAL MEDICINE

## 2018-01-22 PROCEDURE — 99231 SBSQ HOSP IP/OBS SF/LOW 25: CPT | Performed by: INTERNAL MEDICINE

## 2018-01-22 PROCEDURE — 51798 US URINE CAPACITY MEASURE: CPT

## 2018-01-22 PROCEDURE — 99232 SBSQ HOSP IP/OBS MODERATE 35: CPT | Performed by: HOSPITALIST

## 2018-01-22 RX ORDER — SODIUM CHLORIDE 9 MG/ML
500 INJECTION, SOLUTION INTRAVENOUS
Status: COMPLETED | OUTPATIENT
Start: 2018-01-22 | End: 2018-01-22

## 2018-01-22 RX ORDER — ASPIRIN 81 MG/1
81 TABLET, CHEWABLE ORAL DAILY
Status: DISCONTINUED | OUTPATIENT
Start: 2018-01-22 | End: 2018-02-13 | Stop reason: HOSPADM

## 2018-01-22 RX ORDER — SIMVASTATIN 20 MG
10 TABLET ORAL EVERY EVENING
Status: DISCONTINUED | OUTPATIENT
Start: 2018-01-22 | End: 2018-02-13 | Stop reason: HOSPADM

## 2018-01-22 RX ORDER — SODIUM CHLORIDE 9 MG/ML
500 INJECTION, SOLUTION INTRAVENOUS ONCE
Status: COMPLETED | OUTPATIENT
Start: 2018-01-22 | End: 2018-01-22

## 2018-01-22 RX ORDER — ECHINACEA PURPUREA EXTRACT 125 MG
2 TABLET ORAL PRN
Status: DISCONTINUED | OUTPATIENT
Start: 2018-01-22 | End: 2018-02-13 | Stop reason: HOSPADM

## 2018-01-22 RX ORDER — SODIUM CHLORIDE 9 MG/ML
1000 INJECTION, SOLUTION INTRAVENOUS ONCE
Status: COMPLETED | OUTPATIENT
Start: 2018-01-22 | End: 2018-01-22

## 2018-01-22 RX ADMIN — DONEPEZIL HYDROCHLORIDE 5 MG: 5 TABLET, FILM COATED ORAL at 17:42

## 2018-01-22 RX ADMIN — SIMVASTATIN 10 MG: 20 TABLET, FILM COATED ORAL at 20:10

## 2018-01-22 RX ADMIN — ESCITALOPRAM OXALATE 20 MG: 10 TABLET ORAL at 08:56

## 2018-01-22 RX ADMIN — MEMANTINE HYDROCHLORIDE 10 MG: 10 TABLET ORAL at 20:11

## 2018-01-22 RX ADMIN — SODIUM CHLORIDE 500 ML: 9 INJECTION, SOLUTION INTRAVENOUS at 00:44

## 2018-01-22 RX ADMIN — SODIUM CHLORIDE 500 ML: 9 INJECTION, SOLUTION INTRAVENOUS at 01:20

## 2018-01-22 RX ADMIN — ASPIRIN 81 MG: 81 TABLET, CHEWABLE ORAL at 17:42

## 2018-01-22 RX ADMIN — SODIUM CHLORIDE 1000 ML: 9 INJECTION, SOLUTION INTRAVENOUS at 03:59

## 2018-01-22 RX ADMIN — MEMANTINE HYDROCHLORIDE 10 MG: 10 TABLET ORAL at 08:56

## 2018-01-22 ASSESSMENT — PAIN SCALES - GENERAL
PAINLEVEL_OUTOF10: 0
PAINLEVEL_OUTOF10: 0

## 2018-01-22 NOTE — PROGRESS NOTES
Received report from night shift RN, assumed care. Pt. Is awake, on bed. A&Ox3, disoriented to time, no aggressive behavior noted. Up x1 assist with 1 point cane. Pt. denies pain. Pt. Initially refusing scheduled meds, educated, now agreeable to take 2 pills. Changed L upper arm dressing. Safety precautions in place. Bed alarm ON, bed kept low and locked, call light within reach.    Pt. With concerns about SSN and wanted to talk to SW. Relayed information to SW.

## 2018-01-22 NOTE — DISCHARGE PLANNING
Medical Social Work  PC to patient's wife-home and cell: message left to call me.    PC from [facility]: patient is not a candidate for skilled, as PT notes indicate he can walk 500 feet.  Alessandra stated he is candidate for a group home/assisted living.

## 2018-01-22 NOTE — CARE PLAN
Problem: Discharge Barriers/Planning  Goal: Patient's continuum of care needs will be met  Outcome: PROGRESSING AS EXPECTED  Pt. Does not have current plan for placement. Pt.'s wife is attempting to find a home for dementia/alzheimers pts.    Problem: Psychosocial Needs:  Goal: Level of anxiety will decrease  Outcome: PROGRESSING SLOWER THAN EXPECTED  Pt. Threatening to kill him and harm others. Pt. Swinging cane and needing chemical restraints.

## 2018-01-22 NOTE — PROGRESS NOTES
Given a total of 1L NS per IV. Noted BP: 84/56, HR:72. Pt continues to be asleep, no complaints at this time. Will reassess after 15mins. Will notify MD and obtain orders as indicated.

## 2018-01-22 NOTE — PROGRESS NOTES
"Pt. Is A/Ox3, disoriented to time. Denies pain. Pt. Continually asking to \"get out of this place\" and that he is \"stuck\" here. Poc discussed, bed alarm on, call light within reach.   "

## 2018-01-22 NOTE — PROGRESS NOTES
Paged by the patient's primary nurse regarding the patient being agitated and threatening to kill himself and harm the staff.     I came by to the patient's bedside to examine the patient.  The patient is laying in bed but appears agitated but not in any distress.  I have discussed with the patient that he needs to remain calm and take his medication.  Also, I advise the patient not threaten to   harm the staff or kill himself.  Otherwise, the patient will be on restraints for violent behaviors.   The patient states that he will   remain calm tonight but still refuses to take any medication.

## 2018-01-22 NOTE — CARE PLAN
Problem: Knowledge Deficit  Goal: Knowledge of disease process/condition, treatment plan, diagnostic tests, and medications will improve  Outcome: PROGRESSING AS EXPECTED  Pt. Refusing scheduled PO meds. Educated about the importance of medication/treatment adherence but still refusing most of his medications.     Problem: Discharge Barriers/Planning  Goal: Patient's continuum of care needs will be met  Outcome: PROGRESSING AS EXPECTED  Pending disposition. SW on board.

## 2018-01-22 NOTE — DISCHARGE PLANNING
Medical Social Work  PC from patient's wife, returning my call.   Verified that she can not take the patient back, telling me she is worn out and can't do it anymore.  Currently living with her sister.  Gave me a brief history of patient, see Dr. Chadwick chart notes from 1/19 for detail.  Told that she went to [facility] two weeks ago to get patient on Medicaid and the group home waiver.   Completed assessment with Ella Samuel [phone]     PC to Ella, verified that the spouse had been in to apply,  Case was assigned to Vandana at [phone]. Transferred to Candie.  Stated she has until 2/1 to contact the spouse, to do a more complete intake assessment.  Current Medicaid policy is the patient needs to be in a group home before they can assess the patient.  I told her I have been told they need a d/c date and they can assess the patient.  Candie stated she will follow up and verify.  Will contact me when she contacts the patient's spouse.

## 2018-01-22 NOTE — CARE PLAN
"Problem: Psychosocial Needs:  Goal: Level of anxiety will decrease  Outcome: PROGRESSING SLOWER THAN EXPECTED  Pt refuses all medications, had to have IM Geodon, when pt arrives to floor he is quiet but awake, allows staff to put in IV states\" I dont know why Im here, I dont have a wife, I dont have a home, I miss my cat\" Pt is able to hold a conversation for a short while during IV insertion, refuses to put on hospital gown, refuses to allow staff to change dressing to Left forearm although it is very bloody       "

## 2018-01-22 NOTE — PROGRESS NOTES
Per report, pt. Has been attempting to leave all day. Pt. Found on neuroscience unit attempting to leave.   Pt. Is combative with charge RN and security. Swinging cane at staff and threatening to kill him and harm staff. Security called. Pt. Sustained new skin tear on L forearm while being restrained by security. Dr. Luca mckinley, orders placed for soft left/right wrist restraints and Haldol.     Orders changed when put in to Geodon IM.

## 2018-01-22 NOTE — PROGRESS NOTES
Received pt in unit at around 2355. Pt sedated with geodon prior to transfer to unit. Pt lethargic but responds to pain and verbal command, drifts back to sleep after. Noted to have skin tear/ wound to left elbow/forearm. Per report, pt incurred skin tear after being put down by security for aggressive behavior. Dressing already intact. Checked VS both manually and with machine. Noted BP: 71/51, HR:81. Contacted [facility] and received order for IV NS Bolus. Administered accordingly. Will reassess BP after initial bolus. Bed alarm on, changed pt to gown although pt is refusing to have his pants taken off. Applied wanderguard to left ankle with rigid band snuggly for elopement risks. Per Dr. Bradley, okay to apply bilateral soft wrist restraints if ever pt's sedative wearsoff and pt pulling on IV. Kept safe and comfortable.

## 2018-01-22 NOTE — PROGRESS NOTES
Pt refused on several occasions to allow staff to remove clothes. Pt will not remove pants to allow for skin assessment. Pt does have large skin tear to left forearm, dressed with xeroform and transparent dressing. Refuses to let staff remove and take picture. Pt yells loudly and clenches fists when staff asks him to cooperate.

## 2018-01-22 NOTE — PROGRESS NOTES
"Contacted Dr. Arizmendi about persistent low Bp, bladder scan, pt behaviors. Orders 1 liter NS bolus. Pt resting, continues to refuse gown and dressing change to left arm.     0355- lab attempts to draw blood, pt yells at , 2 RNs attempt to convince pt to allow for blood draw, Pt refuses. Pt States \"no more blood, no more pills, I don't need any of it!\"   "

## 2018-01-22 NOTE — PROGRESS NOTES
"Geriatric Progress Note    Chief Complaint   Patient presents with   • Psych Eval     Today's Date: 1/22/2018  Patient Name: Lui Luo  Current Attending: Abelardo Carlson M.D.    Subjective:  72 hour Events: Patient assessed by psychiatry and started on Abilify. MRI brain done and no acute insult reported. Patient became agitated tried to leave the luo and require security to restrain, the patient then tranfers to [facility] 6th floor and wanderguard applied. He received ziprasidone during this outrage episode.     Nursing Report: since his transfer last night his behavior was good, no agitation, but he is refusing his meds and refuse walking or getting out of the bed. He keep asking about his wife and his family. Patient has been requesting nasal spray for congestion.     Patient Report: patient is calm in his bed, he says he recognized us. He would like to talk to his wife, he admit that he doent want to be d/c with his wife, and asked us to help him with placement near his [facility] [place]. He will continue to take meds. He walked with our team in the unit this morning.     Team Discussion: Discussed with RN to continue to mobilize and try to talk to the patient about cars if he resist or need redirection. communicate to the RN that the patien agreed to get a shower if confirm to get clean pant and shirt back after the shower.      Medical Team:  [facility]  Consultants:  PT    Activity Level:   Light    Activities of Daily Living:   Independent      ROS: The items below were reported to be negative, unless otherwise noted above or is in bold type.   Constipation  Diarrhea  Urination  Pain  SOB  Fluctuation of mental status   Vision   Hearing  Nausea  Vomiting  Motor issues.  Sensory issues  Runny nose  Memory issues      Objective:  Physical Exam:   /77   Pulse 84   Temp 36.8 °C (98.3 °F)   Resp 15   Ht 1.6 m (5' 3\")   Wt 62.3 kg (137 lb 5.6 oz)   SpO2 94%   BMI " 24.33 kg/m²     Intake/Output Summary (Last 24 hours) at 01/19/18 1004  Last data filed at 01/19/18 0600   Gross per 24 hour   Intake                0 ml   Output             1525 ml   Net            -1525 ml     General: thin/frail/appears older than stated age. Alert and oriented X to self, person, year, month and place.  CV: S1+S2, regular rhythm with no murmur  Lungs: equal breath sound, normal effort  Hands: osteoporotic changes to bilateral hands noted with Herberben's and Toy's nodes  Back: kyphosis noted, no CVA tenderness  Ext: no c/c/e  Neurological Exam: no focal deficit  Psych: feel abandoned , denies SI. Anxious, redirectable, but he often returns to the subject of his family.    Gait: stable from previous exams, walked around entire unit.     Delirium Screen: negative. Patient was able to say days of the week forwards and backwards    Imaging: MRI, CT brain, no acute insult     EKG: NA    Assessment and Plan:   #Vascular dementia  #b12 Deficiency  -on replacement b12  -recommend starting asa for secondary prevention and perhaps a statin after that.   -continue taper down Donepezil as recommended previously.     #Fall at home  #Vitamin D def  -replacement ordered  -mobilizing patient will decrease fall risk    #Adjustment Disorder with mixed features  -continue on escitalopram and abilify for boost the antidepressant effect as recommended by psych  -recommend against PRN use of antipsychotic. Patient is mostly logical and can be reasoned with. Any misunderstanding should be redirected by talking to the patient about cars .    #nasal congestion  -ordered PRN ocean spray    #Disposition - patient more open to other living options.  Request SW have family meeting with any available members (they can phone conference) and if they cannot care for him to consider other discharge options he is amendable to. He needs support. He needs a health care proxy. See previous note about discussion with patient  about surrogate decision making and code status.        Interventions to be considered in all patients in order to minimize the risk of delirium.   -do not disturb patient (vitals or lab draws) between the hours of 10 PM and 6 AM.  -ideally the patient should not sleep during the day and we should avoid day time naps.   -up in chair for meals  -ambulate at least three times daily   -ensure adequate voiding (see constipation and concern for urinary retention below)  -timed voiding - ask patient is she would like to go to the bathroom q 2-3 hours, except during the do not disturb hours.   -remove all necessary lines (central lines, peripheral IVs, feeding tubes, nevarez catheters)  -unless patient has shown harm to self or others I would recommend against use of restraints - either chemical or physical (antipsychotics)   -Minimize meds that can cause such as benzo, anticholinergics and overdosing on opiates.   -minimize polypharmacy (can any medications be changed to decrease dose or frequency of administration (ie enoxaparin vs heparin for DVT ppx, long acting medications can be used in lieu of short acting ones, medications not dosed during sleep hours)      We will continue to follow that patient peripherally  Karley Huggins M.D.  Geriatrics - [employer]  Please feel free to contact me with any questions.  Extension [phone]   8:00-5:00 Monday - Friday (excluding holidays)    At the time of the visit, I personally examined the patient and evaluated the patient's medical history, physical examination, laboratory results/studies, and assessment and I discussed the findings and formulated the care plan documented by the Geriatric Fellow physician.    Noe Chadwick M.D.  Geriatric Physician

## 2018-01-22 NOTE — PROGRESS NOTES
[facility] Hospitalist Progress Note    Date of Service: 2018    Chief Complaint  82 y.o. Male w/h/o Alzheimer's, stroke  admitted 1/15/2018 with wife reporting she can no longer care for him.  Had SI and findings of hyponatremia. MRI brain w findings of  old CVA  no acute infarction. Hypernatremia corrected.      Interval Problem Update    Txd to medical floor.  No new co. Discussed w SS - to pursue group home vs Snf placement.     Consultants/Specialty  Geriatrics   Psychiatry    Disposition  Anticipate will need Group home vs Snf.        Review of Systems   Unable to perform ROS: Dementia   Constitutional:        No co pain or shortness of breath. Anxious       Physical Exam  Laboratory/Imaging   Hemodynamics  Temp (24hrs), Av.6 °C (97.9 °F), Min:36.3 °C (97.3 °F), Max:36.8 °C (98.3 °F)   Temperature: 36.8 °C (98.3 °F)  Pulse  Av  Min: 59  Max: 108    Blood Pressure : 118/77      Respiratory      Respiration: 15, Pulse Oximetry: 94 %             Fluids    Intake/Output Summary (Last 24 hours) at 18 1009  Last data filed at 18 0500   Gross per 24 hour   Intake             2600 ml   Output              200 ml   Net             2400 ml       Nutrition  Orders Placed This Encounter   Procedures   • Diet Order     Standing Status:   Standing     Number of Occurrences:   1     Order Specific Question:   Diet:     Answer:   Regular [1]     Order Specific Question:   Texture/Fiber modifications:     Answer:   Dysphagia 3(Mechanical Soft)specify fluid consistency(question 6) [3]     Physical Exam   Constitutional: No distress.   HENT:   Head: Normocephalic and atraumatic.   Nose: Nose normal.   Eyes: Conjunctivae and EOM are normal. No scleral icterus.   Neck: Neck supple. No JVD present.   Cardiovascular: Normal rate and regular rhythm.    No murmur heard.  Pulmonary/Chest: Effort normal. No stridor. He has no wheezes. He has no rales.   Abdominal: Soft. Bowel sounds are normal. He exhibits no  distension. There is no tenderness.   Musculoskeletal: He exhibits no edema or tenderness.   Neurological: He is alert.   Demented- ox 2.    Skin: Skin is warm and dry. He is not diaphoretic. No pallor.   Psychiatric: His mood appears anxious. His affect is labile. Cognition and memory are impaired.   Vitals reviewed.                               Assessment/Plan     Vision loss of left eye   Assessment & Plan    CT head neg, MRI brain - no acute CVA, evid for prior stroke.  No eye pain or signs of infxn.  Add asa, statin  Monitor         Suicidal ideation- (present on admission)   Assessment & Plan    No active SI. - felt likely assoc dementia and agitation with previous associations with his son who committed suicide.  Psychiatry consulted appreciate rec.         Irritability- (present on admission)   Assessment & Plan    Less agitation   Continue with psych meds- Abilify started my lifeskills- monitor response.        FTT (failure to thrive) in adult- (present on admission)   Assessment & Plan    Wife unable to care for him at home  SS to assist with placement- consider memory care center, snf or other group home.        Asymptomatic bacteriuria- (present on admission)   Assessment & Plan    Monitor for acute symptoms.         Hypernatremia- (present on admission)   Assessment & Plan    Hypovolemic - corrected w .45 ns  Encourage intake.        Late onset Alzheimer's disease with behavioral disturbance- (present on admission)   Assessment & Plan    Periods of agitation   Donepezil, escitalopram, memantine  Prn Vl[place]on for agiation .  Re directioning  SS for Dc planning.         Discussed case with Geriatric medicine.     Reviewed items::  Labs reviewed, Radiology images reviewed and Medications reviewed  Shaw catheter::  No Shaw  DVT prophylaxis pharmacological::  Heparin

## 2018-01-23 PROBLEM — F32.A DEPRESSION: Status: ACTIVE | Noted: 2018-01-23

## 2018-01-23 LAB
ANION GAP SERPL CALC-SCNC: 11 MMOL/L (ref 0–11.9)
BASOPHILS # BLD AUTO: 0.7 % (ref 0–1.8)
BASOPHILS # BLD: 0.05 K/UL (ref 0–0.12)
BUN SERPL-MCNC: 10 MG/DL (ref 8–22)
CALCIUM SERPL-MCNC: 8.6 MG/DL (ref 8.5–10.5)
CHLORIDE SERPL-SCNC: 102 MMOL/L (ref 96–112)
CO2 SERPL-SCNC: 22 MMOL/L (ref 20–33)
CREAT SERPL-MCNC: 0.85 MG/DL (ref 0.5–1.4)
EOSINOPHIL # BLD AUTO: 0.09 K/UL (ref 0–0.51)
EOSINOPHIL NFR BLD: 1.3 % (ref 0–6.9)
ERYTHROCYTE [DISTWIDTH] IN BLOOD BY AUTOMATED COUNT: 46.4 FL (ref 35.9–50)
GLUCOSE SERPL-MCNC: 97 MG/DL (ref 65–99)
HCT VFR BLD AUTO: 38.5 % (ref 42–52)
HGB BLD-MCNC: 13.3 G/DL (ref 14–18)
IMM GRANULOCYTES # BLD AUTO: 0.1 K/UL (ref 0–0.11)
IMM GRANULOCYTES NFR BLD AUTO: 1.5 % (ref 0–0.9)
LYMPHOCYTES # BLD AUTO: 0.98 K/UL (ref 1–4.8)
LYMPHOCYTES NFR BLD: 14.4 % (ref 22–41)
MCH RBC QN AUTO: 33.8 PG (ref 27–33)
MCHC RBC AUTO-ENTMCNC: 34.5 G/DL (ref 33.7–35.3)
MCV RBC AUTO: 97.7 FL (ref 81.4–97.8)
MONOCYTES # BLD AUTO: 0.42 K/UL (ref 0–0.85)
MONOCYTES NFR BLD AUTO: 6.2 % (ref 0–13.4)
NEUTROPHILS # BLD AUTO: 5.15 K/UL (ref 1.82–7.42)
NEUTROPHILS NFR BLD: 75.9 % (ref 44–72)
NRBC # BLD AUTO: 0 K/UL
NRBC BLD-RTO: 0 /100 WBC
PLATELET # BLD AUTO: 177 K/UL (ref 164–446)
PMV BLD AUTO: 10.9 FL (ref 9–12.9)
POTASSIUM SERPL-SCNC: 3.5 MMOL/L (ref 3.6–5.5)
RBC # BLD AUTO: 3.94 M/UL (ref 4.7–6.1)
SODIUM SERPL-SCNC: 135 MMOL/L (ref 135–145)
WBC # BLD AUTO: 6.8 K/UL (ref 4.8–10.8)

## 2018-01-23 PROCEDURE — 770006 HCHG ROOM/CARE - MED/SURG/GYN SEMI*

## 2018-01-23 PROCEDURE — 700102 HCHG RX REV CODE 250 W/ 637 OVERRIDE(OP): Performed by: HOSPITALIST

## 2018-01-23 PROCEDURE — 85025 COMPLETE CBC W/AUTO DIFF WBC: CPT

## 2018-01-23 PROCEDURE — A9270 NON-COVERED ITEM OR SERVICE: HCPCS | Performed by: HOSPITALIST

## 2018-01-23 PROCEDURE — A9270 NON-COVERED ITEM OR SERVICE: HCPCS | Performed by: PSYCHIATRY & NEUROLOGY

## 2018-01-23 PROCEDURE — 700102 HCHG RX REV CODE 250 W/ 637 OVERRIDE(OP): Performed by: PSYCHIATRY & NEUROLOGY

## 2018-01-23 PROCEDURE — 80048 BASIC METABOLIC PNL TOTAL CA: CPT

## 2018-01-23 PROCEDURE — 97530 THERAPEUTIC ACTIVITIES: CPT

## 2018-01-23 PROCEDURE — 700102 HCHG RX REV CODE 250 W/ 637 OVERRIDE(OP): Performed by: INTERNAL MEDICINE

## 2018-01-23 PROCEDURE — 36415 COLL VENOUS BLD VENIPUNCTURE: CPT

## 2018-01-23 PROCEDURE — A9270 NON-COVERED ITEM OR SERVICE: HCPCS | Performed by: INTERNAL MEDICINE

## 2018-01-23 PROCEDURE — 99232 SBSQ HOSP IP/OBS MODERATE 35: CPT | Performed by: HOSPITALIST

## 2018-01-23 RX ADMIN — ASPIRIN 81 MG: 81 TABLET, CHEWABLE ORAL at 08:32

## 2018-01-23 RX ADMIN — DONEPEZIL HYDROCHLORIDE 5 MG: 5 TABLET, FILM COATED ORAL at 18:06

## 2018-01-23 RX ADMIN — Medication 1000 MCG: at 08:32

## 2018-01-23 RX ADMIN — MONTELUKAST SODIUM 10 MG: 10 TABLET, COATED ORAL at 08:33

## 2018-01-23 RX ADMIN — ARIPIPRAZOLE 2 MG: 2 TABLET ORAL at 09:00

## 2018-01-23 RX ADMIN — MEMANTINE HYDROCHLORIDE 10 MG: 10 TABLET ORAL at 08:32

## 2018-01-23 RX ADMIN — ESCITALOPRAM OXALATE 20 MG: 10 TABLET ORAL at 08:33

## 2018-01-23 RX ADMIN — SIMVASTATIN 10 MG: 20 TABLET, FILM COATED ORAL at 20:03

## 2018-01-23 RX ADMIN — MEMANTINE HYDROCHLORIDE 10 MG: 10 TABLET ORAL at 20:03

## 2018-01-23 ASSESSMENT — PAIN SCALES - GENERAL
PAINLEVEL_OUTOF10: 0
PAINLEVEL_OUTOF10: 0

## 2018-01-23 ASSESSMENT — COGNITIVE AND FUNCTIONAL STATUS - GENERAL
CLIMB 3 TO 5 STEPS WITH RAILING: A LITTLE
SUGGESTED CMS G CODE MODIFIER MOBILITY: CJ
MOBILITY SCORE: 22
WALKING IN HOSPITAL ROOM: A LITTLE

## 2018-01-23 ASSESSMENT — ENCOUNTER SYMPTOMS
COUGH: 0
FEVER: 0
WEAKNESS: 1
ABDOMINAL PAIN: 0

## 2018-01-23 ASSESSMENT — GAIT ASSESSMENTS
DISTANCE (FEET): 250
GAIT LEVEL OF ASSIST: CONTACT GUARD ASSIST
ASSISTIVE DEVICE: SINGLE POINT CANE

## 2018-01-23 NOTE — THERAPY
"Physical Therapy Treatment completed.   Bed Mobility:  Supine to Sit: Supervised  Transfers: Sit to Stand: Supervised  Gait: Level Of Assist: Contact Guard Assist with Single Point Cane       Plan of Care: Will benefit from Physical Therapy 1 times per week  Discharge Recommendations: Equipment: Will Continue to Assess for Equipment Needs. Post-acute therapy: anticipate requirement for 24hr/supervision due to cognition rather than mobility.     Pt appears to be somewhere near his functional baseline. Spouse and step-daughter present to confirm that he recently began using cane for stability. Pt appears pleasantly confused and required CGA for safety rather than overt LOB. He will be seen 1x/week to address any needs that arise but mobility at this time can be performed by nursing staff.      See \"Rehab Therapy-Acute\" Patient Summary Report for complete documentation.       "

## 2018-01-23 NOTE — CARE PLAN
Problem: Knowledge Deficit  Goal: Knowledge of disease process/condition, treatment plan, diagnostic tests, and medications will improve  Talked pt into receiving influenza vaccine this afternoon    Problem: Discharge Barriers/Planning  Goal: Patient's continuum of care needs will be met    Intervention: Involve patient and significant other/support system in setting and prioritizing goals for hospital stay and discharge  All aware of need for pt to go to group home once accepted

## 2018-01-23 NOTE — PROGRESS NOTES
[facility] Hospitalist Progress Note    Date of Service: 2018    Chief Complaint  82 y.o. Male w/h/o Alzheimer's, stroke  admitted 1/15/2018 with wife reporting she can no longer care for him.  Had SI and findings of hypernatremia. MRI brain w findings of old CVA  no acute infarction. Hypernatremia corrected.      Interval Problem Update  Patient seen and examined this morning, doing ok, was a little teary this morning, stating everything hurts because of life. Did not want to talk much. No Si.  He was started on Abilify to augment Lexapro effects. Discussed with Dr. Liu in detail (geriatrics)     Discussed w SS - tgroup home vs Snf placement.    Hypernatremia corrected.     Consultants/Specialty  Geriatrics   Psychiatry    Disposition  Anticipate will need Group home vs Snf.        Review of Systems   Unable to perform ROS: Dementia   Constitutional: Positive for malaise/fatigue. Negative for fever.        ROS is limited 2/2 dementia  Sad, depressed, tearing   HENT: Positive for hearing loss. Negative for congestion.    Respiratory: Negative for cough.    Cardiovascular: Negative for chest pain.   Gastrointestinal: Negative for abdominal pain.   Neurological: Positive for weakness.   Psychiatric/Behavioral: Negative for suicidal ideas.      Physical Exam  Laboratory/Imaging   Hemodynamics  Temp (24hrs), Av.9 °C (98.4 °F), Min:36.7 °C (98.1 °F), Max:37.1 °C (98.8 °F)   Temperature: 36.9 °C (98.5 °F)  Pulse  Av  Min: 59  Max: 108    Blood Pressure : 112/89      Respiratory      Respiration: 18, Pulse Oximetry: 96 %             Fluids    Intake/Output Summary (Last 24 hours) at 18 0732  Last data filed at 18 1800   Gross per 24 hour   Intake              600 ml   Output              680 ml   Net              -80 ml       Nutrition  Orders Placed This Encounter   Procedures   • Diet Order     Standing Status:   Standing     Number of Occurrences:   1     Order Specific Question:   Diet:      Answer:   Regular [1]     Order Specific Question:   Texture/Fiber modifications:     Answer:   Dysphagia 3(Mechanical Soft)specify fluid consistency(question 6) [3]     Physical Exam   Constitutional: No distress.   HENT:   Head: Normocephalic and atraumatic.   Nose: Nose normal.   Eyes: Conjunctivae and EOM are normal. No scleral icterus.   Neck: Neck supple. No JVD present.   Cardiovascular: Normal rate and regular rhythm.    No murmur heard.  Pulmonary/Chest: Effort normal. No stridor. He has no wheezes. He has no rales.   Abdominal: Soft. Bowel sounds are normal. He exhibits no distension. There is no tenderness.   Musculoskeletal: He exhibits no edema or tenderness.   Neurological: He is alert.   Demented- ox 2.    Skin: Skin is warm and dry. He is not diaphoretic. No pallor.   Psychiatric: His affect is blunt and labile. Cognition and memory are impaired. He exhibits a depressed mood.   Depressed, teary   Vitals reviewed.                               Assessment/Plan     * FTT (failure to thrive) in adult- (present on admission)   Assessment & Plan    Wife unable to care for him at home  SS to assist with placement- consider memory care center, snf or other group home.        Depression   Assessment & Plan    Feels depressed this morning, slightly agitation but appropriate otherwise  abilify and lexapro , monitor  Discussed with Geriatrics, agree with plan        Vision loss of left eye   Assessment & Plan    CT head neg, MRI brain - no acute CVA, evid for prior stroke.  No eye pain or signs of infxn.  Add asa, statin  Monitor         Suicidal ideation- (present on admission)   Assessment & Plan    No active SI. - felt likely assoc dementia and agitation with previous associations with his son who committed suicide.  Psychiatry consulted appreciate rec.         Irritability- (present on admission)   Assessment & Plan    More depressed this morning  Continue with psych meds- Abilify monitor response.  Psych on  board        Asymptomatic bacteriuria- (present on admission)   Assessment & Plan    Monitor for acute symptoms.         Hypernatremia- (present on admission)   Assessment & Plan    Hypovolemic - corrected w .45 ns  Encourage intake.  Will repeat BMP        Late onset Alzheimer's disease with behavioral disturbance- (present on admission)   Assessment & Plan    Periods of agitation, encourage frequent re-orientation  Donepezil, escitalopram, memantine  dc Geodon for agiation , monitor at this time  Re directioning  SS for Dc planning.         Discussed case with nurse, SW and Geriatric medicine.     Reviewed items::  Labs reviewed, Radiology images reviewed and Medications reviewed  Shaw catheter::  No Shaw  DVT prophylaxis pharmacological::  Heparin

## 2018-01-23 NOTE — PROGRESS NOTES
Pt A&O x 3, definitely odd and forgetful. Needs cues and reeducation about calling staff before getting up, refused to give his cane to staff. Applied treaded socks over his personal socks because pt refused to take off his own socks.    Wound consult ordered for toenail care.     Wife and daughter in to see pt this morning to talk about pt living in group home as they could not take care of him at their house anymore. Pt resistant but understands too. Worked w/ PT and walked around unit.    Bed alarm is on, call light w/in reach, pt calls at times, treaded socks now on, room close to nursing station, rounding frequently

## 2018-01-23 NOTE — PROGRESS NOTES
Received report from day shift RN, assumed care. Patient is awake, on bed/A&OX2, calm and cooperative. Up X 1 assist, denies pain, due medications given. On continuous SL. Pt on room air, tolerating well. Discussed plan of care. Bed alarm in use, call light and personal belongings within reach, bed kept low, treaded socks on. Assisted as necessary. Kept rested and comfortable at all times.     Patient requested to talk to his son. RN assisted patient to call son but there was no answer. Left vm.     Patient moved to room 601,02 from rom 633,02. Patient orientated to new room and call light.

## 2018-01-23 NOTE — PROGRESS NOTES
Brief Geriatric Note.  Patient seen and examined today. He is stable from a dementia and delirium presepctive. In good spirits as he believes family is coming. We walked around the unit without issue. He recognizes my red beard.   Discussed care with yesterdays RN. Took shower yesterday, mostly adherent to meds. He spoke to his son which calmed him down (the patient did not appear to recall taking to his son)    SW mentioned family will not likely be a viable housing option. Looking for group home as medicaid is pending.   Dicussed with MD.Okay with daily VS as low value to checking at night. (previous check and NS bolus not likely value added care as no mention of any end organ damage concerns) Nursing communication order placed for daily VS only  Requested primary MD consider utility of ziprasidone PRN and suggest to d/c as patient does not have attitudes or behaviors that would require this medications. He is easily redirectable.     Please let us know if we can aid this patients care in another way. We will stop by to help mobilize the patient as able, but may not be making daily visits. If continues to stay will consider further wean of donepezil.     Noe Chadwick M.D.  Geriatric Physician   Can be reached at extension: [phone]  Monday - Friday 8-5

## 2018-01-23 NOTE — CARE PLAN
Problem: Infection  Goal: Will remain free from infection  No s/sx of infection noted. Standard precautions in place.     Problem: Psychosocial Needs:  Goal: Level of anxiety will decrease  Patient assisted to place a call to his son. Patient appeared to relax after leaving a  for son. He is now  Calm and cooperative.

## 2018-01-24 ENCOUNTER — APPOINTMENT (OUTPATIENT)
Dept: RADIOLOGY | Facility: MEDICAL CENTER | Age: 83
DRG: 057 | End: 2018-01-24
Attending: HOSPITALIST
Payer: MEDICARE

## 2018-01-24 PROBLEM — R04.2 HEMOPTYSIS: Status: ACTIVE | Noted: 2018-01-24

## 2018-01-24 PROBLEM — E87.6 HYPOKALEMIA: Status: ACTIVE | Noted: 2018-01-24

## 2018-01-24 LAB
ALBUMIN SERPL BCP-MCNC: 3.8 G/DL (ref 3.2–4.9)
ALBUMIN/GLOB SERPL: 1.6 G/DL
ALP SERPL-CCNC: 61 U/L (ref 30–99)
ALT SERPL-CCNC: 10 U/L (ref 2–50)
ANION GAP SERPL CALC-SCNC: 11 MMOL/L (ref 0–11.9)
ANION GAP SERPL CALC-SCNC: 8 MMOL/L (ref 0–11.9)
AST SERPL-CCNC: 15 U/L (ref 12–45)
BASOPHILS # BLD AUTO: 0.8 % (ref 0–1.8)
BASOPHILS # BLD: 0.06 K/UL (ref 0–0.12)
BILIRUB SERPL-MCNC: 0.8 MG/DL (ref 0.1–1.5)
BUN SERPL-MCNC: 8 MG/DL (ref 8–22)
BUN SERPL-MCNC: 9 MG/DL (ref 8–22)
CALCIUM SERPL-MCNC: 8.7 MG/DL (ref 8.5–10.5)
CALCIUM SERPL-MCNC: 9.5 MG/DL (ref 8.5–10.5)
CHLORIDE SERPL-SCNC: 105 MMOL/L (ref 96–112)
CHLORIDE SERPL-SCNC: 105 MMOL/L (ref 96–112)
CO2 SERPL-SCNC: 23 MMOL/L (ref 20–33)
CO2 SERPL-SCNC: 24 MMOL/L (ref 20–33)
CREAT SERPL-MCNC: 0.8 MG/DL (ref 0.5–1.4)
CREAT SERPL-MCNC: 0.84 MG/DL (ref 0.5–1.4)
EOSINOPHIL # BLD AUTO: 0.26 K/UL (ref 0–0.51)
EOSINOPHIL NFR BLD: 3.6 % (ref 0–6.9)
ERYTHROCYTE [DISTWIDTH] IN BLOOD BY AUTOMATED COUNT: 47.7 FL (ref 35.9–50)
GLOBULIN SER CALC-MCNC: 2.4 G/DL (ref 1.9–3.5)
GLUCOSE SERPL-MCNC: 82 MG/DL (ref 65–99)
GLUCOSE SERPL-MCNC: 95 MG/DL (ref 65–99)
HCT VFR BLD AUTO: 40 % (ref 42–52)
HGB BLD-MCNC: 13.5 G/DL (ref 14–18)
IMM GRANULOCYTES # BLD AUTO: 0.08 K/UL (ref 0–0.11)
IMM GRANULOCYTES NFR BLD AUTO: 1.1 % (ref 0–0.9)
INR PPP: 1.06 (ref 0.87–1.13)
LYMPHOCYTES # BLD AUTO: 1.6 K/UL (ref 1–4.8)
LYMPHOCYTES NFR BLD: 22.3 % (ref 22–41)
MCH RBC QN AUTO: 33.3 PG (ref 27–33)
MCHC RBC AUTO-ENTMCNC: 33.8 G/DL (ref 33.7–35.3)
MCV RBC AUTO: 98.8 FL (ref 81.4–97.8)
MONOCYTES # BLD AUTO: 0.54 K/UL (ref 0–0.85)
MONOCYTES NFR BLD AUTO: 7.5 % (ref 0–13.4)
NEUTROPHILS # BLD AUTO: 4.62 K/UL (ref 1.82–7.42)
NEUTROPHILS NFR BLD: 64.7 % (ref 44–72)
NRBC # BLD AUTO: 0 K/UL
NRBC BLD-RTO: 0 /100 WBC
PLATELET # BLD AUTO: 202 K/UL (ref 164–446)
PMV BLD AUTO: 10.9 FL (ref 9–12.9)
POTASSIUM SERPL-SCNC: 3.3 MMOL/L (ref 3.6–5.5)
POTASSIUM SERPL-SCNC: 3.8 MMOL/L (ref 3.6–5.5)
PROT SERPL-MCNC: 6.2 G/DL (ref 6–8.2)
PROTHROMBIN TIME: 13.5 SEC (ref 12–14.6)
RBC # BLD AUTO: 4.05 M/UL (ref 4.7–6.1)
SODIUM SERPL-SCNC: 136 MMOL/L (ref 135–145)
SODIUM SERPL-SCNC: 140 MMOL/L (ref 135–145)
WBC # BLD AUTO: 7.2 K/UL (ref 4.8–10.8)

## 2018-01-24 PROCEDURE — A9270 NON-COVERED ITEM OR SERVICE: HCPCS | Performed by: PSYCHIATRY & NEUROLOGY

## 2018-01-24 PROCEDURE — 700102 HCHG RX REV CODE 250 W/ 637 OVERRIDE(OP): Performed by: HOSPITALIST

## 2018-01-24 PROCEDURE — 80048 BASIC METABOLIC PNL TOTAL CA: CPT

## 2018-01-24 PROCEDURE — A9270 NON-COVERED ITEM OR SERVICE: HCPCS | Performed by: HOSPITALIST

## 2018-01-24 PROCEDURE — 700102 HCHG RX REV CODE 250 W/ 637 OVERRIDE(OP): Performed by: INTERNAL MEDICINE

## 2018-01-24 PROCEDURE — 85610 PROTHROMBIN TIME: CPT

## 2018-01-24 PROCEDURE — G8988 SELF CARE GOAL STATUS: HCPCS | Mod: CI

## 2018-01-24 PROCEDURE — 36415 COLL VENOUS BLD VENIPUNCTURE: CPT

## 2018-01-24 PROCEDURE — 85025 COMPLETE CBC W/AUTO DIFF WBC: CPT

## 2018-01-24 PROCEDURE — 770006 HCHG ROOM/CARE - MED/SURG/GYN SEMI*

## 2018-01-24 PROCEDURE — 71045 X-RAY EXAM CHEST 1 VIEW: CPT

## 2018-01-24 PROCEDURE — G8989 SELF CARE D/C STATUS: HCPCS | Mod: CI

## 2018-01-24 PROCEDURE — 97535 SELF CARE MNGMENT TRAINING: CPT

## 2018-01-24 PROCEDURE — 99233 SBSQ HOSP IP/OBS HIGH 50: CPT | Performed by: HOSPITALIST

## 2018-01-24 PROCEDURE — A9270 NON-COVERED ITEM OR SERVICE: HCPCS | Performed by: INTERNAL MEDICINE

## 2018-01-24 PROCEDURE — 80053 COMPREHEN METABOLIC PANEL: CPT

## 2018-01-24 PROCEDURE — 700102 HCHG RX REV CODE 250 W/ 637 OVERRIDE(OP): Performed by: PSYCHIATRY & NEUROLOGY

## 2018-01-24 RX ORDER — POTASSIUM CHLORIDE 20 MEQ/1
20 TABLET, EXTENDED RELEASE ORAL DAILY
Status: DISCONTINUED | OUTPATIENT
Start: 2018-01-24 | End: 2018-02-08

## 2018-01-24 RX ADMIN — Medication 1000 MCG: at 08:13

## 2018-01-24 RX ADMIN — MEMANTINE HYDROCHLORIDE 10 MG: 10 TABLET ORAL at 20:41

## 2018-01-24 RX ADMIN — MEMANTINE HYDROCHLORIDE 10 MG: 10 TABLET ORAL at 08:13

## 2018-01-24 RX ADMIN — ASPIRIN 81 MG: 81 TABLET, CHEWABLE ORAL at 08:14

## 2018-01-24 RX ADMIN — DONEPEZIL HYDROCHLORIDE 5 MG: 5 TABLET, FILM COATED ORAL at 18:16

## 2018-01-24 RX ADMIN — MONTELUKAST SODIUM 10 MG: 10 TABLET, COATED ORAL at 08:14

## 2018-01-24 RX ADMIN — ARIPIPRAZOLE 2 MG: 2 TABLET ORAL at 08:15

## 2018-01-24 RX ADMIN — SIMVASTATIN 10 MG: 20 TABLET, FILM COATED ORAL at 20:41

## 2018-01-24 RX ADMIN — POTASSIUM CHLORIDE 20 MEQ: 1500 TABLET, EXTENDED RELEASE ORAL at 09:00

## 2018-01-24 RX ADMIN — ESCITALOPRAM OXALATE 20 MG: 10 TABLET ORAL at 08:14

## 2018-01-24 ASSESSMENT — ENCOUNTER SYMPTOMS
FEVER: 0
COUGH: 0
ABDOMINAL PAIN: 0
WEAKNESS: 1

## 2018-01-24 ASSESSMENT — COGNITIVE AND FUNCTIONAL STATUS - GENERAL
SUGGESTED CMS G CODE MODIFIER DAILY ACTIVITY: CH
DAILY ACTIVITIY SCORE: 24

## 2018-01-24 NOTE — THERAPY
"Occupational Therapy Treatment completed with focus on ADLs, ADL transfers and patient education.  Functional Status:  Pt seen for OT tx. Supv supine > sit, supv sit > stand. Pt fully dressed appropriately. Pt demonstrated ability to complete full body dressing w/ setup and supv. Supv toileting and toilet transfer. Pt demonstrated standing tolerance to complete light ADLs w/ supv. Pt pleasant and cooperative. Pt reports he is upset about current situation and \"feels lost.\"   Plan of Care: D/t current cognitive deficits pt at this time is requiring 24/7 supv and assistance upon appropriate medical d/c home. Pt has met goals here in acute care setting. Will discuss POC, frequency and goals w/ pt.   Discharge Recommendations:  Equipment Will Continue to Assess for Equipment Needs.     See \"Rehab Therapy-Acute\" Patient Summary Report for complete documentation.   "

## 2018-01-24 NOTE — ASSESSMENT & PLAN NOTE
Resolved. 1/24/18--Had small bright red blood clot after coughing this morning-- suspect Traumatic cough- stable resp symptoms.   Monitor.

## 2018-01-24 NOTE — PROGRESS NOTES
Patient is calm ,room air,plan of care was discussed with patient,no distress and denies pain,fall prevention in placed,hourly rounding in placed.

## 2018-01-24 NOTE — PROGRESS NOTES
Pt A&O x2-3, confused regarding time and situation. But oriented enough to perform most ADLs on his own. Walks w/ stand by assist w/ cane to bathroom, although still refusing for staff to take cane (sleeps w/ cane in bed).    VSS, medically stable, taking all medication except lovenox, fall precautions in place: bed alarm on, call light w/in reach, treaded socks on, room near nursing station, fall band on and fall signs in place, rounding frequently

## 2018-01-24 NOTE — PROGRESS NOTES
Pt coughed up a bit of gricelda blood mid morning, notified hospitalist, CXR ordered. No further hemoptysis noted since

## 2018-01-24 NOTE — PROGRESS NOTES
[facility] Hospitalist Progress Note    Date of Service: 2018    Chief Complaint  82 y.o. Male w/h/o Alzheimer's, stroke  admitted 1/15/2018 with wife reporting she can no longer care for him.  Had SI and findings of hypernatremia. MRI brain w findings of old CVA  no acute infarction. Hypernatremia corrected.      Interval Problem Update  Patient seen and examined this morning,doing better, his wife and daughter visited yesterday and he is more understanding of trying to go to a group home.   He was started on Abilify to augment Lexapro effects.      Discussed w SS - group home vs Snf placement pending medicaid   Hypernatremia corrected.  Hypokalemia: replace today  Vitals stable     Consultants/Specialty  Geriatrics   Psychiatry    Disposition  Anticipate will need Group home vs Snf.        Review of Systems   Unable to perform ROS: Dementia   Constitutional: Positive for malaise/fatigue. Negative for fever.        ROS is limited 2/2 dementia  Sad, depressed, tearing   HENT: Positive for hearing loss. Negative for congestion.    Respiratory: Negative for cough.    Cardiovascular: Negative for chest pain.   Gastrointestinal: Negative for abdominal pain.   Neurological: Positive for weakness.   Psychiatric/Behavioral: Negative for suicidal ideas.      Physical Exam  Laboratory/Imaging   Hemodynamics  Temp (24hrs), Av.9 °C (98.5 °F), Min:36.9 °C (98.4 °F), Max:36.9 °C (98.5 °F)   Temperature: 36.9 °C (98.4 °F)  Pulse  Av.8  Min: 59  Max: 108    Blood Pressure : 128/83      Respiratory      Respiration: 16, Pulse Oximetry: 95 %        RUL Breath Sounds: Clear, RML Breath Sounds: Clear, RLL Breath Sounds: Clear, SANTY Breath Sounds: Clear, LLL Breath Sounds: Clear    Fluids    Intake/Output Summary (Last 24 hours) at 18 0738  Last data filed at 18 0400   Gross per 24 hour   Intake              400 ml   Output              950 ml   Net             -550 ml       Nutrition  Orders Placed This Encounter    Procedures   • Diet Order     Standing Status:   Standing     Number of Occurrences:   1     Order Specific Question:   Diet:     Answer:   Regular [1]     Order Specific Question:   Texture/Fiber modifications:     Answer:   Dysphagia 3(Mechanical Soft)specify fluid consistency(question 6) [3]     Physical Exam   Constitutional: No distress.   HENT:   Head: Normocephalic and atraumatic.   Nose: Nose normal.   Eyes: Conjunctivae and EOM are normal. No scleral icterus.   Neck: Neck supple. No JVD present.   Cardiovascular: Normal rate and regular rhythm.    No murmur heard.  Pulmonary/Chest: Effort normal. No stridor. He has no wheezes. He has no rales.   Abdominal: Soft. Bowel sounds are normal. He exhibits no distension. There is no tenderness. There is no rebound.   Musculoskeletal: He exhibits no edema, tenderness or deformity.   Neurological: He is alert.   Demented- ox 2.    Skin: Skin is warm and dry. He is not diaphoretic. No pallor.   Psychiatric: His affect is blunt and labile. Cognition and memory are impaired. He exhibits a depressed mood.   Smiled for me today   Vitals reviewed.      Recent Labs      01/23/18   1039   WBC  6.8   RBC  3.94*   HEMOGLOBIN  13.3*   HEMATOCRIT  38.5*   MCV  97.7   MCH  33.8*   MCHC  34.5   RDW  46.4   PLATELETCT  177   MPV  10.9     Recent Labs      01/23/18   1039  01/24/18   0227   SODIUM  135  140   POTASSIUM  3.5*  3.3*   CHLORIDE  102  105   CO2  22  24   GLUCOSE  97  82   BUN  10  8   CREATININE  0.85  0.80   CALCIUM  8.6  8.7                      Assessment/Plan     * FTT (failure to thrive) in adult- (present on admission)   Assessment & Plan    Wife unable to care for him at home  SS to assist with placement- consider memory care center, snf or other group home.        Hemoptysis   Assessment & Plan    Had small bright red bllod clog after coughing this morning, stable vitals, on RA  CBC with stable h/h noted to be 13.3/38.5 respectively on 1/23/18,  no white  count, plt 177, renal function is normal.     Will check CXR, CBC, CMP, Pt/INR  monitor        Depression   Assessment & Plan    abilify and lexapro , monitor          Hypokalemia   Assessment & Plan    3.3 this morning, KDUR replacement        Vision loss of left eye   Assessment & Plan    CT head neg, MRI brain - no acute CVA, evid for prior stroke.  No eye pain or signs of infxn.  Add asa, statin  Monitor         Suicidal ideation- (present on admission)   Assessment & Plan    No active SI. - felt likely assoc dementia and agitation with previous associations with his son who committed suicide.  Psychiatry consulted appreciate rec.         Irritability- (present on admission)   Assessment & Plan      Continue with psych meds- Abilify monitor response.  Psych on board        Asymptomatic bacteriuria- (present on admission)   Assessment & Plan    Monitor for acute symptoms.         Hypernatremia- (present on admission)   Assessment & Plan    Hypovolemic - corrected w .45 ns-- now normalized  Encourage intake.          Late onset Alzheimer's disease with behavioral disturbance- (present on admission)   Assessment & Plan    Periods of agitation, encourage frequent re-orientation  Donepezil, escitalopram, memantine  dc Geodon for agiation , monitor at this time  Re directioning   for Dc planning.         Discussed case with nurse, SHANITA      Reviewed items::  Labs reviewed, Radiology images reviewed and Medications reviewed  Shaw catheter::  No Shaw  DVT prophylaxis pharmacological::  Heparin      Labs hgb 13.3, Na 140, k 3.3 ( 1/23/18)  Hemoptysis now, labs and CXR pending    I spent a total of 40 minutes of time during this clinical encounter of which > 50% was devoted to counseling and coordinating care including review of records, pertinent lab data and studies, as well as discussing diagnostic evaluation and work up, planned therapeutic interventions and future disposition of care. Where indicated, the  assessment and plan reflect discussion of patient with consultants, other healthcare providers, family members, and additional research needed to obtain further information in formulating the plan of care of this patient. This time includes no procedures or overlap with other providers.

## 2018-01-24 NOTE — CARE PLAN
Problem: Venous Thromboembolism (VTW)/Deep Vein Thrombosis (DVT) Prevention:  Goal: Patient will participate in Venous Thrombosis (VTE)/Deep Vein Thrombosis (DVT)Prevention Measures  Outcome: PROGRESSING SLOWER THAN EXPECTED  Refusing lovenox and SCDs, although pt walks around unit throughout the day    Problem: Discharge Barriers/Planning  Goal: Patient's continuum of care needs will be met    Intervention: Collaborate with Transitional Care Team and Interdisciplinary Team to meet discharge needs  Pt waiting for group home approval

## 2018-01-25 PROCEDURE — 770006 HCHG ROOM/CARE - MED/SURG/GYN SEMI*

## 2018-01-25 PROCEDURE — A9270 NON-COVERED ITEM OR SERVICE: HCPCS | Performed by: INTERNAL MEDICINE

## 2018-01-25 PROCEDURE — 36415 COLL VENOUS BLD VENIPUNCTURE: CPT

## 2018-01-25 PROCEDURE — 86480 TB TEST CELL IMMUN MEASURE: CPT

## 2018-01-25 PROCEDURE — A9270 NON-COVERED ITEM OR SERVICE: HCPCS | Performed by: PSYCHIATRY & NEUROLOGY

## 2018-01-25 PROCEDURE — 700102 HCHG RX REV CODE 250 W/ 637 OVERRIDE(OP): Performed by: HOSPITALIST

## 2018-01-25 PROCEDURE — 700111 HCHG RX REV CODE 636 W/ 250 OVERRIDE (IP): Performed by: INTERNAL MEDICINE

## 2018-01-25 PROCEDURE — A9270 NON-COVERED ITEM OR SERVICE: HCPCS | Performed by: HOSPITALIST

## 2018-01-25 PROCEDURE — 99232 SBSQ HOSP IP/OBS MODERATE 35: CPT | Performed by: HOSPITALIST

## 2018-01-25 PROCEDURE — 700102 HCHG RX REV CODE 250 W/ 637 OVERRIDE(OP): Performed by: PSYCHIATRY & NEUROLOGY

## 2018-01-25 PROCEDURE — 700102 HCHG RX REV CODE 250 W/ 637 OVERRIDE(OP): Performed by: INTERNAL MEDICINE

## 2018-01-25 RX ADMIN — POTASSIUM CHLORIDE 20 MEQ: 1500 TABLET, EXTENDED RELEASE ORAL at 08:22

## 2018-01-25 RX ADMIN — ASPIRIN 81 MG: 81 TABLET, CHEWABLE ORAL at 08:22

## 2018-01-25 RX ADMIN — MEMANTINE HYDROCHLORIDE 10 MG: 10 TABLET ORAL at 08:22

## 2018-01-25 RX ADMIN — MONTELUKAST SODIUM 10 MG: 10 TABLET, COATED ORAL at 08:22

## 2018-01-25 RX ADMIN — POLYETHYLENE GLYCOL 3350 1 PACKET: 17 POWDER, FOR SOLUTION ORAL at 08:22

## 2018-01-25 RX ADMIN — Medication 1000 MCG: at 08:22

## 2018-01-25 RX ADMIN — ARIPIPRAZOLE 2 MG: 2 TABLET ORAL at 08:28

## 2018-01-25 RX ADMIN — SIMVASTATIN 10 MG: 20 TABLET, FILM COATED ORAL at 21:00

## 2018-01-25 RX ADMIN — MEMANTINE HYDROCHLORIDE 10 MG: 10 TABLET ORAL at 21:00

## 2018-01-25 RX ADMIN — DONEPEZIL HYDROCHLORIDE 5 MG: 5 TABLET, FILM COATED ORAL at 21:00

## 2018-01-25 RX ADMIN — ERGOCALCIFEROL 50000 UNITS: 1.25 CAPSULE ORAL at 12:01

## 2018-01-25 RX ADMIN — ENOXAPARIN SODIUM 40 MG: 100 INJECTION SUBCUTANEOUS at 08:22

## 2018-01-25 RX ADMIN — ESCITALOPRAM OXALATE 20 MG: 10 TABLET ORAL at 08:22

## 2018-01-25 ASSESSMENT — ENCOUNTER SYMPTOMS
ABDOMINAL PAIN: 0
COUGH: 0
DEPRESSION: 0
FEVER: 0
WEAKNESS: 1

## 2018-01-25 ASSESSMENT — PAIN SCALES - GENERAL
PAINLEVEL_OUTOF10: 0

## 2018-01-25 NOTE — PROGRESS NOTES
[facility] Hospitalist Progress Note    Date of Service: 2018    Chief Complaint  82 y.o. Male w/h/o Alzheimer's, stroke  admitted 1/15/2018 with wife reporting she can no longer care for him.  Had SI and findings of hypernatremia. MRI brain w findings of old CVA  no acute infarction. Hypernatremia corrected.      Interval Problem Update  Patient seen and examined this morning, frustrated stating his wife left him here, she wanted to move to [place] so that she can get rid of him. Alert to self only. Confused about how long he has been in [place], states he doesn't remember anything.    Discussed w SS - group home vs Snf placement pending medicaid   Hypernatremia corrected.  Hypokalemia: replace today  Vitals stable     Consultants/Specialty  Geriatrics   Psychiatry    Disposition  Anticipate will need Group home vs Snf.        Review of Systems   Unable to perform ROS: Dementia   Constitutional: Positive for malaise/fatigue. Negative for fever.        ROS is limited 2/2 dementia  Sad, depressed, tearing   HENT: Positive for hearing loss. Negative for congestion.    Respiratory: Negative for cough.    Cardiovascular: Negative for chest pain.   Gastrointestinal: Negative for abdominal pain.   Neurological: Positive for weakness.   Psychiatric/Behavioral: Negative for depression and suicidal ideas.      Physical Exam  Laboratory/Imaging   Hemodynamics  Temp (24hrs), Av.7 °C (98.1 °F), Min:36.7 °C (98 °F), Max:36.8 °C (98.2 °F)   Temperature: 36.8 °C (98.2 °F)  Pulse  Av.9  Min: 59  Max: 108    Blood Pressure : 126/79      Respiratory      Respiration: 16, Pulse Oximetry: 96 %        RUL Breath Sounds: Clear, RML Breath Sounds: Clear, RLL Breath Sounds: Clear, SANTY Breath Sounds: Clear, LLL Breath Sounds: Clear    Fluids    Intake/Output Summary (Last 24 hours) at 18 1129  Last data filed at 18 0822   Gross per 24 hour   Intake              680 ml   Output              710 ml   Net              -30 ml        Nutrition  Orders Placed This Encounter   Procedures   • Diet Order     Standing Status:   Standing     Number of Occurrences:   1     Order Specific Question:   Diet:     Answer:   Regular [1]     Order Specific Question:   Texture/Fiber modifications:     Answer:   Dysphagia 3(Mechanical Soft)specify fluid consistency(question 6) [3]     Physical Exam   Constitutional: No distress.   HENT:   Head: Normocephalic and atraumatic.   Nose: Nose normal.   Eyes: Conjunctivae and EOM are normal. No scleral icterus.   Neck: Neck supple. No JVD present.   Cardiovascular: Normal rate and regular rhythm.    No murmur heard.  Pulmonary/Chest: Effort normal. No stridor. He has no wheezes. He has no rales.   Abdominal: Soft. Bowel sounds are normal. He exhibits no distension. There is no tenderness. There is no rebound.   Musculoskeletal: He exhibits no edema, tenderness or deformity.   Neurological: He is alert.   Demented- ox 2.    Skin: Skin is warm and dry. He is not diaphoretic. No pallor.   Psychiatric: His affect is blunt and labile. Cognition and memory are impaired. He exhibits a depressed mood.   Smiled for me today   Vitals reviewed.      Recent Labs      01/23/18   1039  01/24/18   1339   WBC  6.8  7.2   RBC  3.94*  4.05*   HEMOGLOBIN  13.3*  13.5*   HEMATOCRIT  38.5*  40.0*   MCV  97.7  98.8*   MCH  33.8*  33.3*   MCHC  34.5  33.8   RDW  46.4  47.7   PLATELETCT  177  202   MPV  10.9  10.9     Recent Labs      01/23/18   1039  01/24/18   0227  01/24/18   1339   SODIUM  135  140  136   POTASSIUM  3.5*  3.3*  3.8   CHLORIDE  102  105  105   CO2  22  24  23   GLUCOSE  97  82  95   BUN  10  8  9   CREATININE  0.85  0.80  0.84   CALCIUM  8.6  8.7  9.5     Recent Labs      01/24/18   1339   INR  1.06                  Assessment/Plan     * FTT (failure to thrive) in adult- (present on admission)   Assessment & Plan    Wife unable to care for him at home  SS to assist with placement- consider memory care center, snf or  other group home.        Hemoptysis   Assessment & Plan    Resolved. 1/24/18--Had small bright red bllod clog after coughing this morning, stable vitals, on RA  CBC with stable h/h noted to be 13.3/38.5 respectively on 1/23/18,  no white count, plt 177, renal function is normal.      CXR, CBC, CMP, Pt/INR all normal 1/24/15  monitor        Depression   Assessment & Plan    abilify and lexapro , monitor          Hypokalemia   Assessment & Plan    Replaced and now stable        Vision loss of left eye   Assessment & Plan    CT head neg, MRI brain - no acute CVA, evid for prior stroke.  No eye pain or signs of infxn.  Add asa, statin  Monitor         Suicidal ideation- (present on admission)   Assessment & Plan    No active SI. - felt likely assoc dementia and agitation with previous associations with his son who committed suicide.  Psychiatry consulted appreciate rec.         Irritability- (present on admission)   Assessment & Plan      Continue with psych meds- Abilify monitor response.  Psych on board        Asymptomatic bacteriuria- (present on admission)   Assessment & Plan    Monitor for acute symptoms.         Hypernatremia- (present on admission)   Assessment & Plan    Hypovolemic - corrected w .45 ns-- now normalized  Encourage intake.          Late onset Alzheimer's disease with behavioral disturbance- (present on admission)   Assessment & Plan    Periods of agitation, encourage frequent re-orientation  Donepezil, escitalopram, memantine  dc Geodon for agiation , monitor at this time  Re directioning   for Dc planning.         Discussed case with nurse, SHANITA      Reviewed items::  Labs reviewed, Radiology images reviewed and Medications reviewed  Shaw catheter::  No Shaw  DVT prophylaxis pharmacological::  Heparin

## 2018-01-25 NOTE — PROGRESS NOTES
Wife and sister-in-law in to visit pt and met with palliative care nurse, Josefina. Pt calm with wife at bedside. Wife explained plan to pt and pt in agreeable at this time to go live at a group home.

## 2018-01-25 NOTE — CARE PLAN
Problem: Knowledge Deficit  Goal: Knowledge of disease process/condition, treatment plan, diagnostic tests, and medications will improve  Wax and weans in his orientation due to dementia    Problem: Discharge Barriers/Planning  Goal: Patient's continuum of care needs will be met  Awaiting placement to a group home

## 2018-01-25 NOTE — CONSULTS
"Reason for PC Consult: Advance Care Planning    Consulted by: Dr. Figueroa, Hosp    Assessment:  General: 83 y/o male admitted s/p CVA with dementia, SI and inability of wife to care for him at home. PMHx undocumented- per wife, pt had 2 surgeries for \"dead bowel\" in  with sepsis and rehab. Son  of an aneurysm in  and daughter had CVA in her 40s    Consults: psych    Dyspnea: No-    Last BM: 18-    Pain: No-    Depression: No-    Dementia: Yes;  4    Spiritual:  Is Worship or spirituality important for coping with this illness? No-    Has a  or spiritual provider visit been requested? No    Palliative Performance Scale: 70%    Advance Directive: None-    DPOA: No- NOK is wife Brenda   POLST: No- completed today    Code Status: Full- should be DNR    Outcome:  PC RN met with the patient, his wife Brenda and her sister at bedside. She expressed appreciation for all the work to find her  group home placement. PC with discussion on goals and wishes regard to future plan of care. Code status was discussed and the patient stated “just let me go” and Brenda agreed that limiting the “major interventions” was desired. They elected for return to the hospital if needed but no “heroic measures.” The POLST was completed and signed by Brenda. She expressed that she receives only $700/month to live off of and struggling to find a place of her own to live now that they'd had to relocate. PC Rn expressed understanding of her concerns. They expressed understanding of SW assisting with DC plan and thankful for that help. No questions remained and Brenda was provided with this RNs number for any questions or concerns.    Updated: MD/RN    Plan: plan to  when arranged    Recommendations: I do not recommend an ethics or hospice consult at this time because family not interested.    Thank you for allowing Palliative Care to participate in this patient's care. Please feel free to call [phone] with any " questions or concerns.

## 2018-01-25 NOTE — DISCHARGE PLANNING
Medical Social Work  PC to Makayla at Aging; went over his needs with her.  Makayla stated they are along the lines of what his wife had stated.  Told me he should qualify for Level 10 $20.00 per day or Level 2-$45.00 per day due to his behaviors. Due to his income being so low, $700.00 Makayla would send request to [employer] to bump it up to $1141.00 as the group homes would take all but $140.00 per month.  As for time frame, it depends on when he moves in.  If in the first of the month, usually by the next month. Gave me several names of G/H that have openings, [facility], [facility], [facility] and [facility].     Requested patient's nurse to complete a TB test.

## 2018-01-25 NOTE — PROGRESS NOTES
Mentation wax and wanes , bed alarm on for safety and assistance out of bed with his cane. Took his medications without a problem.cont plan of care, call light within reach visual checks through the night

## 2018-01-25 NOTE — DISCHARGE PLANNING
During Long Length of Stay rounds this morning it was recommended that referrals be sent out to group homes so that we have a place to send the patient once the group home waiver is approved.  He could go to [facility] while he is pending the waiver. Espinoza, , said that he will send out referrals to group homes.

## 2018-01-26 LAB
M TB TUBERC IFN-G BLD QL: NEGATIVE
M TB TUBERC IFN-G/MITOGEN IGNF BLD: 0.07
M TB TUBERC IGNF/MITOGEN IGNF CONTROL: 57.3 [IU]/ML
MITOGEN IGNF BCKGRD COR BLD-ACNC: 0.11 [IU]/ML

## 2018-01-26 PROCEDURE — A9270 NON-COVERED ITEM OR SERVICE: HCPCS | Performed by: INTERNAL MEDICINE

## 2018-01-26 PROCEDURE — 700102 HCHG RX REV CODE 250 W/ 637 OVERRIDE(OP): Performed by: HOSPITALIST

## 2018-01-26 PROCEDURE — A9270 NON-COVERED ITEM OR SERVICE: HCPCS | Performed by: PSYCHIATRY & NEUROLOGY

## 2018-01-26 PROCEDURE — A9270 NON-COVERED ITEM OR SERVICE: HCPCS | Performed by: HOSPITALIST

## 2018-01-26 PROCEDURE — 700102 HCHG RX REV CODE 250 W/ 637 OVERRIDE(OP): Performed by: INTERNAL MEDICINE

## 2018-01-26 PROCEDURE — 770006 HCHG ROOM/CARE - MED/SURG/GYN SEMI*

## 2018-01-26 PROCEDURE — 99232 SBSQ HOSP IP/OBS MODERATE 35: CPT | Performed by: HOSPITALIST

## 2018-01-26 PROCEDURE — 700102 HCHG RX REV CODE 250 W/ 637 OVERRIDE(OP): Performed by: PSYCHIATRY & NEUROLOGY

## 2018-01-26 PROCEDURE — 700111 HCHG RX REV CODE 636 W/ 250 OVERRIDE (IP): Performed by: INTERNAL MEDICINE

## 2018-01-26 RX ADMIN — ARIPIPRAZOLE 2 MG: 2 TABLET ORAL at 08:14

## 2018-01-26 RX ADMIN — MEMANTINE HYDROCHLORIDE 10 MG: 10 TABLET ORAL at 20:23

## 2018-01-26 RX ADMIN — SIMVASTATIN 10 MG: 20 TABLET, FILM COATED ORAL at 20:23

## 2018-01-26 RX ADMIN — MEMANTINE HYDROCHLORIDE 10 MG: 10 TABLET ORAL at 08:12

## 2018-01-26 RX ADMIN — MONTELUKAST SODIUM 10 MG: 10 TABLET, COATED ORAL at 08:12

## 2018-01-26 RX ADMIN — Medication 1000 MCG: at 08:12

## 2018-01-26 RX ADMIN — ESCITALOPRAM OXALATE 20 MG: 10 TABLET ORAL at 08:12

## 2018-01-26 RX ADMIN — DONEPEZIL HYDROCHLORIDE 5 MG: 5 TABLET, FILM COATED ORAL at 19:29

## 2018-01-26 RX ADMIN — ENOXAPARIN SODIUM 40 MG: 100 INJECTION SUBCUTANEOUS at 08:14

## 2018-01-26 RX ADMIN — POTASSIUM CHLORIDE 20 MEQ: 1500 TABLET, EXTENDED RELEASE ORAL at 08:12

## 2018-01-26 RX ADMIN — ASPIRIN 81 MG: 81 TABLET, CHEWABLE ORAL at 08:12

## 2018-01-26 ASSESSMENT — ENCOUNTER SYMPTOMS
DEPRESSION: 0
WEAKNESS: 1
FEVER: 0
ABDOMINAL PAIN: 0
COUGH: 0

## 2018-01-26 ASSESSMENT — LIFESTYLE VARIABLES: DO YOU DRINK ALCOHOL: NO

## 2018-01-26 NOTE — CARE PLAN
Problem: Knowledge Deficit  Goal: Knowledge of disease process/condition, treatment plan, diagnostic tests, and medications will improve  Outcome: PROGRESSING SLOWER THAN EXPECTED  pts confusion interferes with learning, mostly directable, cooperative. Pt does not remember events from previous day.     Problem: Psychosocial Needs:  Goal: Level of anxiety will decrease  Outcome: PROGRESSING AS EXPECTED  Pt denies anxiety, but is confused, forgets POC shortly after discussing

## 2018-01-26 NOTE — PROGRESS NOTES
Anesthetic History               Review of Systems / Medical History  Patient summary reviewed and pertinent labs reviewed    Pulmonary          Smoker  Asthma : poorly controlled       Neuro/Psych              Cardiovascular    Hypertension: well controlled              Exercise tolerance: >4 METS     GI/Hepatic/Renal           Liver disease     Endo/Other    Diabetes: well controlled, type 2         Other Findings   Comments:   Risk Factors for Postoperative nausea/vomiting:       History of postoperative nausea/vomiting? NO       Female? YES       Motion sickness? NO       Intended opioid administration for postoperative analgesia? NO      Smoking Abstinence  Current Smoker? YES  Elective Surgery? YES  Seen preoperatively by anesthesiologist or proxy prior to day of surgery? YES  Pt abstained from smoking 24 hours prior to anesthesia?  NO           Physical Exam    Airway  Mallampati: III  TM Distance: 4 - 6 cm  Neck ROM: decreased range of motion   Mouth opening: Diminished (comment)     Cardiovascular    Rhythm: regular  Rate: normal         Dental    Dentition: Full upper dentures and Lower partial plate     Pulmonary  Breath sounds clear to auscultation               Abdominal  GI exam deferred       Other Findings            Anesthetic Plan    ASA: 3  Anesthesia type: MAC            Anesthetic plan and risks discussed with: Patient [facility]ist Progress Note    Date of Service: 2018    Chief Complaint  82 y.o. Male w/h/o Alzheimer's, stroke  admitted 1/15/2018 with wife reporting she can no longer care for him.  Had SI and findings of hypernatremia. MRI brain w findings of old CVA  no acute infarction. Hypernatremia corrected.      Interval Problem Update  Patient seen and examined this morning, frustrated stating his wife left him here, she wanted to move to [place] so that she can get rid of him. Alert to self only. Confused about how long he has been in [place], states he doesn't remember anything.    Discussed w SS - group home vs Snf placement pending medicaid   Hypernatremia corrected.    Vitals stable     Consultants/Specialty  Geriatrics   Psychiatry    Disposition  Anticipate will need Group home vs Snf.        Review of Systems   Unable to perform ROS: Dementia   Constitutional: Positive for malaise/fatigue. Negative for fever.        ROS is limited 2/2 dementia  Sad, depressed, tearing   HENT: Positive for hearing loss. Negative for congestion.    Respiratory: Negative for cough.    Cardiovascular: Negative for chest pain.   Gastrointestinal: Negative for abdominal pain.   Neurological: Positive for weakness.   Psychiatric/Behavioral: Negative for depression and suicidal ideas.      Physical Exam  Laboratory/Imaging   Hemodynamics  Temp (24hrs), Av.3 °C (97.3 °F), Min:36.1 °C (97 °F), Max:36.4 °C (97.6 °F)   Temperature: 36.1 °C (97 °F)  Pulse  Av.9  Min: 59  Max: 108    Blood Pressure : 113/87      Respiratory      Respiration: 16, Pulse Oximetry: 97 %        RUL Breath Sounds: Clear, RML Breath Sounds: Clear, RLL Breath Sounds: Clear, SANTY Breath Sounds: Clear, LLL Breath Sounds: Clear    Fluids    Intake/Output Summary (Last 24 hours) at 18 1558  Last data filed at 18 0900   Gross per 24 hour   Intake              490 ml   Output              400 ml   Net               90 ml       Nutrition  Orders Placed  This Encounter   Procedures   • Diet Order     Standing Status:   Standing     Number of Occurrences:   1     Order Specific Question:   Diet:     Answer:   Regular [1]     Order Specific Question:   Texture/Fiber modifications:     Answer:   Dysphagia 3(Mechanical Soft)specify fluid consistency(question 6) [3]     Physical Exam   Constitutional: No distress.   HENT:   Head: Normocephalic and atraumatic.   Nose: Nose normal.   Eyes: Conjunctivae and EOM are normal. No scleral icterus.   Neck: Neck supple. No JVD present.   Cardiovascular: Normal rate and regular rhythm.    No murmur heard.  Pulmonary/Chest: Effort normal. No stridor. He has no wheezes. He has no rales.   Abdominal: Soft. Bowel sounds are normal. He exhibits no distension. There is no tenderness. There is no rebound.   Musculoskeletal: He exhibits no edema, tenderness or deformity.   Neurological: He is alert.   Demented- ox 2.    Skin: Skin is warm and dry. He is not diaphoretic. No pallor.   Psychiatric: His affect is blunt and labile. Cognition and memory are impaired. He exhibits a depressed mood.   frustrated   Vitals reviewed.      Recent Labs      01/24/18   1339   WBC  7.2   RBC  4.05*   HEMOGLOBIN  13.5*   HEMATOCRIT  40.0*   MCV  98.8*   MCH  33.3*   MCHC  33.8   RDW  47.7   PLATELETCT  202   MPV  10.9     Recent Labs      01/24/18   0227  01/24/18   1339   SODIUM  140  136   POTASSIUM  3.3*  3.8   CHLORIDE  105  105   CO2  24  23   GLUCOSE  82  95   BUN  8  9   CREATININE  0.80  0.84   CALCIUM  8.7  9.5     Recent Labs      01/24/18   1339   INR  1.06                  Assessment/Plan     * FTT (failure to thrive) in adult- (present on admission)   Assessment & Plan    Wife unable to care for him at home  SS to assist with placement- consider memory care center, snf or other group home.        Hemoptysis   Assessment & Plan    Resolved. 1/24/18--Had small bright red bllod clog after coughing this morning, stable vitals, on RA  CBC with  stable h/h noted to be 13.3/38.5 respectively on 1/23/18,  no white count, plt 177, renal function is normal.      CXR, CBC, CMP, Pt/INR all normal 1/24/15  monitor        Depression   Assessment & Plan    abilify and lexapro , monitor          Hypokalemia   Assessment & Plan    Replaced and now stable        Vision loss of left eye   Assessment & Plan    CT head neg, MRI brain - no acute CVA, evid for prior stroke.  No eye pain or signs of infxn.  Add asa, statin  Monitor         Suicidal ideation- (present on admission)   Assessment & Plan    No active SI. - felt likely assoc dementia and agitation with previous associations with his son who committed suicide.  Psychiatry consulted appreciate rec.         Irritability- (present on admission)   Assessment & Plan      Continue with psych meds- Abilify monitor response.  Psych on board        Asymptomatic bacteriuria- (present on admission)   Assessment & Plan    Monitor for acute symptoms.         Hypernatremia- (present on admission)   Assessment & Plan    Hypovolemic - corrected w .45 ns-- now normalized  Encourage intake.          Late onset Alzheimer's disease with behavioral disturbance- (present on admission)   Assessment & Plan    Periods of agitation, encourage frequent re-orientation  Donepezil, escitalopram, memantine  dc Geodon for agiation , monitor at this time  Re directioning   for Dc planning.         Discussed case with nurse, SHANITA Carter dc, pending group home to come evaluate patient    Reviewed items::  Labs reviewed, Radiology images reviewed and Medications reviewed  Shaw catheter::  No Shaw  DVT prophylaxis pharmacological::  Heparin

## 2018-01-26 NOTE — DISCHARGE PLANNING
Medical Social Work  Late entry from 1/25/18  PC from April Denise, [phone], requesting information on patient.  Talked to patient about giving information to his son and daughter in law.  Patient stated yes, and to have his son call him.  Telling me he hasn't talked to him in a long time.  Patient's nurse stated patient was just on the phone with his son.    PC to April: gave her an update on patient and d/c plan.  April stated that patient and wife lived with them last year and it didn't work out.  Requested I contact her when patient is to be d/c.

## 2018-01-26 NOTE — DISCHARGE PLANNING
Medical Social Work  PC to [facility]: message left to call me.  PC to [place] [id number]; message left to call me.  PC to [facility]: message left to call me.

## 2018-01-26 NOTE — DISCHARGE PLANNING
Medical Social Work  Late entry from 1/25/18  PC to [facility]: message left to call  [facility]  PC to [facility], they do have a male opening.  Went over patient's needs, behaviors etc.  Will come and assess the patient.

## 2018-01-26 NOTE — PROGRESS NOTES
"Pt agreeable, takes medications, requests coffee often, pt states \"I dont know why Im here, my wife just dropped me off\" Pt occasionally gets upset with roommate when calling out during the night. Denies pain, uses urinal with no assistance   "

## 2018-01-26 NOTE — PROGRESS NOTES
Patient aox3 without any complaints of pain or discomfort, resting in bed.  Safety precautions implemented, will continue to monitor

## 2018-01-27 LAB
ANION GAP SERPL CALC-SCNC: 7 MMOL/L (ref 0–11.9)
BUN SERPL-MCNC: 8 MG/DL (ref 8–22)
CALCIUM SERPL-MCNC: 9.2 MG/DL (ref 8.5–10.5)
CHLORIDE SERPL-SCNC: 105 MMOL/L (ref 96–112)
CO2 SERPL-SCNC: 24 MMOL/L (ref 20–33)
CREAT SERPL-MCNC: 0.83 MG/DL (ref 0.5–1.4)
GLUCOSE SERPL-MCNC: 99 MG/DL (ref 65–99)
POTASSIUM SERPL-SCNC: 4 MMOL/L (ref 3.6–5.5)
SODIUM SERPL-SCNC: 136 MMOL/L (ref 135–145)

## 2018-01-27 PROCEDURE — 700102 HCHG RX REV CODE 250 W/ 637 OVERRIDE(OP): Performed by: HOSPITALIST

## 2018-01-27 PROCEDURE — A9270 NON-COVERED ITEM OR SERVICE: HCPCS | Performed by: HOSPITALIST

## 2018-01-27 PROCEDURE — A9270 NON-COVERED ITEM OR SERVICE: HCPCS | Performed by: PSYCHIATRY & NEUROLOGY

## 2018-01-27 PROCEDURE — 700111 HCHG RX REV CODE 636 W/ 250 OVERRIDE (IP): Performed by: INTERNAL MEDICINE

## 2018-01-27 PROCEDURE — 99232 SBSQ HOSP IP/OBS MODERATE 35: CPT | Performed by: HOSPITALIST

## 2018-01-27 PROCEDURE — 700102 HCHG RX REV CODE 250 W/ 637 OVERRIDE(OP): Performed by: PSYCHIATRY & NEUROLOGY

## 2018-01-27 PROCEDURE — 80048 BASIC METABOLIC PNL TOTAL CA: CPT

## 2018-01-27 PROCEDURE — 700102 HCHG RX REV CODE 250 W/ 637 OVERRIDE(OP): Performed by: INTERNAL MEDICINE

## 2018-01-27 PROCEDURE — 36415 COLL VENOUS BLD VENIPUNCTURE: CPT

## 2018-01-27 PROCEDURE — 770006 HCHG ROOM/CARE - MED/SURG/GYN SEMI*

## 2018-01-27 PROCEDURE — A9270 NON-COVERED ITEM OR SERVICE: HCPCS | Performed by: INTERNAL MEDICINE

## 2018-01-27 RX ADMIN — ARIPIPRAZOLE 2 MG: 2 TABLET ORAL at 10:12

## 2018-01-27 RX ADMIN — POLYETHYLENE GLYCOL 3350 1 PACKET: 17 POWDER, FOR SOLUTION ORAL at 10:07

## 2018-01-27 RX ADMIN — POTASSIUM CHLORIDE 20 MEQ: 1500 TABLET, EXTENDED RELEASE ORAL at 10:06

## 2018-01-27 RX ADMIN — SIMVASTATIN 10 MG: 20 TABLET, FILM COATED ORAL at 20:15

## 2018-01-27 RX ADMIN — ENOXAPARIN SODIUM 40 MG: 100 INJECTION SUBCUTANEOUS at 10:07

## 2018-01-27 RX ADMIN — MEMANTINE HYDROCHLORIDE 10 MG: 10 TABLET ORAL at 10:06

## 2018-01-27 RX ADMIN — DONEPEZIL HYDROCHLORIDE 5 MG: 5 TABLET, FILM COATED ORAL at 18:35

## 2018-01-27 RX ADMIN — MONTELUKAST SODIUM 10 MG: 10 TABLET, COATED ORAL at 10:06

## 2018-01-27 RX ADMIN — ASPIRIN 81 MG: 81 TABLET, CHEWABLE ORAL at 10:06

## 2018-01-27 RX ADMIN — MEMANTINE HYDROCHLORIDE 10 MG: 10 TABLET ORAL at 20:14

## 2018-01-27 RX ADMIN — Medication 1000 MCG: at 10:06

## 2018-01-27 RX ADMIN — ESCITALOPRAM OXALATE 20 MG: 10 TABLET ORAL at 10:06

## 2018-01-27 ASSESSMENT — PAIN SCALES - GENERAL
PAINLEVEL_OUTOF10: 0
PAINLEVEL_OUTOF10: 0

## 2018-01-27 ASSESSMENT — ENCOUNTER SYMPTOMS
ABDOMINAL PAIN: 0
DEPRESSION: 0
COUGH: 0
WEAKNESS: 1
FEVER: 0

## 2018-01-27 NOTE — PROGRESS NOTES
[facility]    Date of Service: 2018    Chief Complaint  82 y.o. Male w/h/o Alzheimer's, stroke  admitted 1/15/2018 with wife reporting she can no longer care for him.  Had SI and findings of hypernatremia. MRI brain w findings of old CVA  no acute infarction. Hypernatremia corrected.      Interval Problem Update  Patient seen and examined this morning,  Alert to self only. Getting lab drawn at this time, doing ok  Discussed w SS - group home vs Snf placement pending medicaid      Vitals stable     Consultants/Specialty  Geriatrics   Psychiatry    Disposition  Anticipate will need Group home vs Snf.        Review of Systems   Unable to perform ROS: Dementia   Constitutional: Positive for malaise/fatigue. Negative for fever.        ROS is limited 2/2 dementia  Sad, depressed, tearing   HENT: Positive for hearing loss. Negative for congestion.    Respiratory: Negative for cough.    Cardiovascular: Negative for chest pain.   Gastrointestinal: Negative for abdominal pain.   Neurological: Positive for weakness.   Psychiatric/Behavioral: Negative for depression and suicidal ideas.      Physical Exam  Laboratory/Imaging   Hemodynamics  Temp (24hrs), Av.6 °C (97.8 °F), Min:36.1 °C (97 °F), Max:36.8 °C (98.3 °F)   Temperature: 36.8 °C (98.3 °F)  Pulse  Av  Min: 59  Max: 108    Blood Pressure : 114/76      Respiratory      Respiration: 17, Pulse Oximetry: 95 %        RUL Breath Sounds: Clear, RML Breath Sounds: Clear, RLL Breath Sounds: Clear, SANTY Breath Sounds: Clear, LLL Breath Sounds: Clear    Fluids    Intake/Output Summary (Last 24 hours) at 18 0652  Last data filed at 18 0500   Gross per 24 hour   Intake              740 ml   Output             1190 ml   Net             -450 ml       Nutrition  Orders Placed This Encounter   Procedures   • Diet Order     Standing Status:   Standing     Number of Occurrences:   1     Order Specific Question:   Diet:     Answer:   Regular [1]      Order Specific Question:   Texture/Fiber modifications:     Answer:   Dysphagia 3(Mechanical Soft)specify fluid consistency(question 6) [3]     Physical Exam   Constitutional: He appears well-developed. No distress.   HENT:   Head: Normocephalic and atraumatic.   Nose: Nose normal.   Eyes: Conjunctivae and EOM are normal. No scleral icterus.   Neck: Neck supple. No JVD present.   Cardiovascular: Normal rate and regular rhythm.    No murmur heard.  Pulmonary/Chest: Effort normal. No stridor. He has no wheezes. He has no rales.   Abdominal: Soft. Bowel sounds are normal. He exhibits no distension. There is no tenderness. There is no rebound.   Musculoskeletal: He exhibits no edema, tenderness or deformity.   Neurological: He is alert.   Demented- ox 1.    Skin: Skin is warm and dry. He is not diaphoretic. No pallor.   Psychiatric: His affect is blunt and labile. Cognition and memory are impaired. He exhibits a depressed mood.   frustrated   Vitals reviewed.      Recent Labs      01/24/18   1339   WBC  7.2   RBC  4.05*   HEMOGLOBIN  13.5*   HEMATOCRIT  40.0*   MCV  98.8*   MCH  33.3*   MCHC  33.8   RDW  47.7   PLATELETCT  202   MPV  10.9     Recent Labs      01/24/18   1339   SODIUM  136   POTASSIUM  3.8   CHLORIDE  105   CO2  23   GLUCOSE  95   BUN  9   CREATININE  0.84   CALCIUM  9.5     Recent Labs      01/24/18   1339   INR  1.06                  Assessment/Plan     * FTT (failure to thrive) in adult- (present on admission)   Assessment & Plan    Wife unable to care for him at home  SS to assist with placement- consider memory care center, snf or other group home.        Hemoptysis   Assessment & Plan    Resolved. 1/24/18--Had small bright red bllod clog after coughing this morning, stable vitals, on RA  CBC with stable h/h noted to be 13.3/38.5 respectively on 1/23/18,  no white count, plt 177, renal function is normal.      CXR, CBC, CMP, Pt/INR all normal 1/24/15  monitor        Depression   Assessment &  Plan    abilify and lexapro , monitor          Hypokalemia   Assessment & Plan    Replaced and now stable        Vision loss of left eye   Assessment & Plan    CT head neg, MRI brain - no acute CVA, evid for prior stroke.  No eye pain or signs of infxn.  Add asa, statin  Monitor         Suicidal ideation- (present on admission)   Assessment & Plan    No active SI. - felt likely assoc dementia and agitation with previous associations with his son who committed suicide.  Psychiatry consulted appreciate rec.         Irritability- (present on admission)   Assessment & Plan      Continue with psych meds- Abilify monitor response.  Psych on board        Asymptomatic bacteriuria- (present on admission)   Assessment & Plan    Monitor for acute symptoms.         Hypernatremia- (present on admission)   Assessment & Plan    Hypovolemic - corrected w .45 ns-- now normalized  Encourage intake.          Late onset Alzheimer's disease with behavioral disturbance- (present on admission)   Assessment & Plan    Periods of agitation, encourage frequent re-orientation  Donepezil, escitalopram, memantine  dc Geodon for agiation , monitor at this time  Re directioning   for Dc planning.         Discussed case with nurse, SHANITA  Difficult dc, pending group home to come evaluate patient    Reviewed items::  Labs reviewed, Radiology images reviewed and Medications reviewed  Shaw catheter::  No Shaw  DVT prophylaxis pharmacological::  Heparin        BMP normal 1/27/18

## 2018-01-27 NOTE — PROGRESS NOTES
Pt resting in bed, requests that his food not come chapped up, pt not eating chopped food, would like to cut up himself. Pt asks questions about his social security check and his money if he is to go to a nursing home, pt educated that his money is still his and will be used for his needs, pt content with that answer, IV removed from RFA as it no longer works and is painful to the patient. Pt rests off and on throughout the night. Very pleasant.

## 2018-01-27 NOTE — CARE PLAN
Problem: Discharge Barriers/Planning  Goal: Patient's continuum of care needs will be met  Patient to complete plan of care

## 2018-01-28 PROCEDURE — A9270 NON-COVERED ITEM OR SERVICE: HCPCS | Performed by: HOSPITALIST

## 2018-01-28 PROCEDURE — A9270 NON-COVERED ITEM OR SERVICE: HCPCS | Performed by: PSYCHIATRY & NEUROLOGY

## 2018-01-28 PROCEDURE — 700102 HCHG RX REV CODE 250 W/ 637 OVERRIDE(OP): Performed by: PSYCHIATRY & NEUROLOGY

## 2018-01-28 PROCEDURE — 700102 HCHG RX REV CODE 250 W/ 637 OVERRIDE(OP): Performed by: INTERNAL MEDICINE

## 2018-01-28 PROCEDURE — 770006 HCHG ROOM/CARE - MED/SURG/GYN SEMI*

## 2018-01-28 PROCEDURE — 99232 SBSQ HOSP IP/OBS MODERATE 35: CPT | Performed by: HOSPITALIST

## 2018-01-28 PROCEDURE — 700111 HCHG RX REV CODE 636 W/ 250 OVERRIDE (IP): Performed by: INTERNAL MEDICINE

## 2018-01-28 PROCEDURE — A9270 NON-COVERED ITEM OR SERVICE: HCPCS | Performed by: INTERNAL MEDICINE

## 2018-01-28 PROCEDURE — 700102 HCHG RX REV CODE 250 W/ 637 OVERRIDE(OP): Performed by: HOSPITALIST

## 2018-01-28 RX ADMIN — Medication 1000 MCG: at 08:10

## 2018-01-28 RX ADMIN — ARIPIPRAZOLE 2 MG: 2 TABLET ORAL at 08:15

## 2018-01-28 RX ADMIN — SIMVASTATIN 10 MG: 20 TABLET, FILM COATED ORAL at 20:08

## 2018-01-28 RX ADMIN — MONTELUKAST SODIUM 10 MG: 10 TABLET, COATED ORAL at 08:11

## 2018-01-28 RX ADMIN — MEMANTINE HYDROCHLORIDE 10 MG: 10 TABLET ORAL at 20:08

## 2018-01-28 RX ADMIN — ASPIRIN 81 MG: 81 TABLET, CHEWABLE ORAL at 08:11

## 2018-01-28 RX ADMIN — ESCITALOPRAM OXALATE 20 MG: 10 TABLET ORAL at 08:11

## 2018-01-28 RX ADMIN — ENOXAPARIN SODIUM 40 MG: 100 INJECTION SUBCUTANEOUS at 08:11

## 2018-01-28 RX ADMIN — MEMANTINE HYDROCHLORIDE 10 MG: 10 TABLET ORAL at 08:11

## 2018-01-28 RX ADMIN — POTASSIUM CHLORIDE 20 MEQ: 1500 TABLET, EXTENDED RELEASE ORAL at 08:11

## 2018-01-28 RX ADMIN — DONEPEZIL HYDROCHLORIDE 5 MG: 5 TABLET, FILM COATED ORAL at 18:29

## 2018-01-28 RX ADMIN — POLYETHYLENE GLYCOL 3350 1 PACKET: 17 POWDER, FOR SOLUTION ORAL at 08:11

## 2018-01-28 ASSESSMENT — ENCOUNTER SYMPTOMS
DEPRESSION: 0
ABDOMINAL PAIN: 0
FEVER: 0
WEAKNESS: 1
COUGH: 0

## 2018-01-28 NOTE — PROGRESS NOTES
[facility]ist Progress Note    Date of Service: 2018    Chief Complaint  82 y.o. Male w/h/o Alzheimer's, stroke  admitted 1/15/2018 with wife reporting she can no longer care for him.  Had SI and findings of hypernatremia. MRI brain w findings of old CVA  no acute infarction. Hypernatremia corrected.      Interval Problem Update  Patient seen and examined this morning,  Alert to self only. Cr better this morning, no complaints  Discussed w SS - group home vs Snf placement pending medicaid    Vitals stable     Consultants/Specialty  Geriatrics   Psychiatry    Disposition  Anticipate will need Group home vs Snf.        Review of Systems   Unable to perform ROS: Dementia   Constitutional: Negative for fever and malaise/fatigue.        ROS is limited 2/2 dementia  Sad, depressed, tearing   HENT: Positive for hearing loss. Negative for congestion.    Respiratory: Negative for cough.    Cardiovascular: Negative for chest pain.   Gastrointestinal: Negative for abdominal pain.   Neurological: Positive for weakness.   Psychiatric/Behavioral: Negative for depression and suicidal ideas.      Physical Exam  Laboratory/Imaging   Hemodynamics  Temp (24hrs), Av.7 °C (98.1 °F), Min:36.6 °C (97.8 °F), Max:37.1 °C (98.8 °F)   Temperature: 36.6 °C (97.8 °F)  Pulse  Av.7  Min: 59  Max: 108    Blood Pressure : 126/90      Respiratory      Respiration: 18, Pulse Oximetry: 96 %        RUL Breath Sounds: Clear, RML Breath Sounds: Clear, RLL Breath Sounds: Clear, SANTY Breath Sounds: Clear, LLL Breath Sounds: Clear    Fluids    Intake/Output Summary (Last 24 hours) at 18 1001  Last data filed at 18 0902   Gross per 24 hour   Intake              960 ml   Output              650 ml   Net              310 ml       Nutrition  Orders Placed This Encounter   Procedures   • Diet Order     Standing Status:   Standing     Number of Occurrences:   1     Order Specific Question:   Diet:     Answer:   Regular [1]     Order  Specific Question:   Texture/Fiber modifications:     Answer:   Dysphagia 3(Mechanical Soft)specify fluid consistency(question 6) [3]     Physical Exam   Constitutional: He appears well-developed. No distress.   HENT:   Head: Normocephalic and atraumatic.   Nose: Nose normal.   Eyes: Conjunctivae and EOM are normal. No scleral icterus.   Neck: Neck supple. No JVD present.   Cardiovascular: Normal rate and regular rhythm.    No murmur heard.  Pulmonary/Chest: Effort normal. No stridor. He has no wheezes. He has no rales.   Abdominal: Soft. Bowel sounds are normal. He exhibits no distension. There is no tenderness. There is no rebound.   Musculoskeletal: He exhibits no edema, tenderness or deformity.   Neurological: He is alert.   Demented- ox 1.    Skin: Skin is warm and dry. He is not diaphoretic. No pallor.   Psychiatric: His affect is not blunt and not labile. He exhibits a depressed mood.   frustrated   Vitals reviewed.          Recent Labs      01/27/18   0807   SODIUM  136   POTASSIUM  4.0   CHLORIDE  105   CO2  24   GLUCOSE  99   BUN  8   CREATININE  0.83   CALCIUM  9.2                      Assessment/Plan     * FTT (failure to thrive) in adult- (present on admission)   Assessment & Plan    Wife unable to care for him at home  SS to assist with placement- consider memory care center, snf or other group home.        Hemoptysis   Assessment & Plan    Resolved. 1/24/18--Had small bright red blood clog after coughing this morning, stable vitals, on RA  CBC with stable h/h noted to be 13.3/38.5 respectively on 1/23/18,  no white count, plt 177, renal function is normal.      CXR, CBC, CMP, Pt/INR all normal 1/24/15  monitor        Depression   Assessment & Plan    abilify and lexapro , monitor          Hypokalemia   Assessment & Plan    Replaced and now stable        Vision loss of left eye   Assessment & Plan    CT head neg, MRI brain - no acute CVA, evid for prior stroke.  No eye pain or signs of infxn.  Add asa,  statin  Monitor         Suicidal ideation- (present on admission)   Assessment & Plan    No active SI. - felt likely assoc dementia and agitation with previous associations with his son who committed suicide.  Psychiatry consulted appreciate rec.         Irritability- (present on admission)   Assessment & Plan      Continue with psych meds- Abilify monitor response.  Psych on board        Asymptomatic bacteriuria- (present on admission)   Assessment & Plan    Monitor for acute symptoms.         Hypernatremia- (present on admission)   Assessment & Plan    Hypovolemic - corrected w .45 ns-- now normalized  Encourage intake.          Late onset Alzheimer's disease with behavioral disturbance- (present on admission)   Assessment & Plan    Periods of agitation, encourage frequent re-orientation  Donepezil, escitalopram, memantine  dc Geodon for agiation , monitor at this time  Re directioning   for Dc planning.         Discussed case with nurse, SHANITA Carter dc, pending group home to come evaluate patient    Reviewed items::  Labs reviewed, Radiology images reviewed and Medications reviewed  Shaw catheter::  No Shaw  DVT prophylaxis pharmacological::  Heparin        BMP normal 1/28/18, cr improving

## 2018-01-28 NOTE — PROGRESS NOTES
Assumed care of patient. Patient with baseline confusion, alzheimers. Denies pain/discomfort. Denies nausea, Denies SOB. Fall precautions in place. Patient awaiting placement into group home.

## 2018-01-28 NOTE — PROGRESS NOTES
Assumed care of pt, discussed plan of care. A&Ox2, disoriented to time, event. Forgetful at times. RA, tolerates well. Clear throughout. Last BM, 01/27. Continent, uses urinal. Skin intact; x fragile, bruising, skin tear LUE. SBA with cane, steady shuffle. On regular, dysphagia 3 diet. Takes pills whole without difficulty. No PIV. Fall precautions in place; bed lowest position, bed alarm on, treaded socks on, call light within reach. All needs met at this time.

## 2018-01-28 NOTE — PROGRESS NOTES
Assumed care of pt. A/Ox3, disoriented to time, gets confused easily and becomes forgetful. Re-oriented to environment and discussed plan of care. Pt on room air, tolerating regular/ mechanical soft diet, up with one assist. Possible discharge to [facility]? Bed alarm on, bed in lowest position, treaded socks on, personal belongings and call light within reach, instructed to call for any assistance.

## 2018-01-28 NOTE — CARE PLAN
Problem: Venous Thromboembolism (VTW)/Deep Vein Thrombosis (DVT) Prevention:  Goal: Patient will participate in Venous Thrombosis (VTE)/Deep Vein Thrombosis (DVT)Prevention Measures  Outcome: PROGRESSING AS EXPECTED  Lovenox administered per MAR     Problem: Discharge Barriers/Planning  Goal: Patient's continuum of care needs will be met  Outcome: PROGRESSING AS EXPECTED  Placement needed, discharge plan pending

## 2018-01-28 NOTE — CARE PLAN
Problem: Communication  Goal: The ability to communicate needs accurately and effectively will improve    Intervention: Educate patient and significant other/support system about the plan of care, procedures, treatments, medications and allow for questions  Discussed plan of care, compliant with scheduled meds. Pt able to express needs effectively.       Problem: Discharge Barriers/Planning  Goal: Patient's continuum of care needs will be met    Intervention: Involve patient and significant other/support system in setting and prioritizing goals for hospital stay and discharge  Pt awaiting placement to [facility], SW involved with discharge planning.

## 2018-01-29 PROCEDURE — A9270 NON-COVERED ITEM OR SERVICE: HCPCS | Performed by: INTERNAL MEDICINE

## 2018-01-29 PROCEDURE — A9270 NON-COVERED ITEM OR SERVICE: HCPCS | Performed by: HOSPITALIST

## 2018-01-29 PROCEDURE — 700102 HCHG RX REV CODE 250 W/ 637 OVERRIDE(OP): Performed by: INTERNAL MEDICINE

## 2018-01-29 PROCEDURE — 770006 HCHG ROOM/CARE - MED/SURG/GYN SEMI*

## 2018-01-29 PROCEDURE — 99232 SBSQ HOSP IP/OBS MODERATE 35: CPT | Performed by: HOSPITALIST

## 2018-01-29 PROCEDURE — 700102 HCHG RX REV CODE 250 W/ 637 OVERRIDE(OP): Performed by: HOSPITALIST

## 2018-01-29 PROCEDURE — 700102 HCHG RX REV CODE 250 W/ 637 OVERRIDE(OP): Performed by: PSYCHIATRY & NEUROLOGY

## 2018-01-29 PROCEDURE — A9270 NON-COVERED ITEM OR SERVICE: HCPCS | Performed by: PSYCHIATRY & NEUROLOGY

## 2018-01-29 PROCEDURE — 700111 HCHG RX REV CODE 636 W/ 250 OVERRIDE (IP): Performed by: INTERNAL MEDICINE

## 2018-01-29 RX ADMIN — ESCITALOPRAM OXALATE 20 MG: 10 TABLET ORAL at 09:19

## 2018-01-29 RX ADMIN — POLYETHYLENE GLYCOL 3350 1 PACKET: 17 POWDER, FOR SOLUTION ORAL at 09:18

## 2018-01-29 RX ADMIN — ASPIRIN 81 MG: 81 TABLET, CHEWABLE ORAL at 09:18

## 2018-01-29 RX ADMIN — POTASSIUM CHLORIDE 20 MEQ: 1500 TABLET, EXTENDED RELEASE ORAL at 09:19

## 2018-01-29 RX ADMIN — MONTELUKAST SODIUM 10 MG: 10 TABLET, COATED ORAL at 09:19

## 2018-01-29 RX ADMIN — MEMANTINE HYDROCHLORIDE 10 MG: 10 TABLET ORAL at 20:10

## 2018-01-29 RX ADMIN — ENOXAPARIN SODIUM 40 MG: 100 INJECTION SUBCUTANEOUS at 09:19

## 2018-01-29 RX ADMIN — Medication 1000 MCG: at 09:19

## 2018-01-29 RX ADMIN — MEMANTINE HYDROCHLORIDE 10 MG: 10 TABLET ORAL at 09:19

## 2018-01-29 RX ADMIN — SIMVASTATIN 10 MG: 20 TABLET, FILM COATED ORAL at 20:10

## 2018-01-29 RX ADMIN — DONEPEZIL HYDROCHLORIDE 5 MG: 5 TABLET, FILM COATED ORAL at 18:26

## 2018-01-29 RX ADMIN — ARIPIPRAZOLE 2 MG: 2 TABLET ORAL at 09:20

## 2018-01-29 ASSESSMENT — PAIN SCALES - GENERAL
PAINLEVEL_OUTOF10: 0
PAINLEVEL_OUTOF10: 0

## 2018-01-29 ASSESSMENT — ENCOUNTER SYMPTOMS
ABDOMINAL PAIN: 0
FEVER: 0
DEPRESSION: 0
COUGH: 0
WEAKNESS: 1

## 2018-01-29 NOTE — PROGRESS NOTES
[facility] Hospitalist Progress Note    Date of Service: 2018    Chief Complaint  82 y.o. Male w/h/o Alzheimer's, stroke  admitted 1/15/2018 with wife reporting she can no longer care for him.  Had SI and findings of hypernatremia. MRI brain w findings of old CVA  no acute infarction. Hypernatremia corrected.      Interval Problem Update  Patient seen and examined this morning,  Alert to self , doesn't really smile,consfued otherwise. No complaints  Vitals stable     Consultants/Specialty  Geriatrics   Psychiatry    Disposition  Anticipate will need Group home vs Snf. [facility]        Review of Systems   Unable to perform ROS: Dementia   Constitutional: Negative for fever and malaise/fatigue.        ROS is limited 2/2 dementia  Sad, depressed, tearing   HENT: Positive for hearing loss. Negative for congestion.    Respiratory: Negative for cough.    Cardiovascular: Negative for chest pain.   Gastrointestinal: Negative for abdominal pain.   Neurological: Positive for weakness.   Psychiatric/Behavioral: Negative for depression and suicidal ideas.      Physical Exam  Laboratory/Imaging   Hemodynamics  Temp (24hrs), Av.8 °C (98.3 °F), Min:36.7 °C (98.1 °F), Max:36.9 °C (98.4 °F)   Temperature: 36.8 °C (98.2 °F)  Pulse  Av  Min: 59  Max: 108    Blood Pressure : 134/90      Respiratory      Respiration: 16, Pulse Oximetry: 98 %        RUL Breath Sounds: Clear, RML Breath Sounds: Clear, RLL Breath Sounds: Diminished, SANTY Breath Sounds: Clear, LLL Breath Sounds: Diminished    Fluids    Intake/Output Summary (Last 24 hours) at 18 0842  Last data filed at 18 0400   Gross per 24 hour   Intake             2040 ml   Output             1050 ml   Net              990 ml       Nutrition  Orders Placed This Encounter   Procedures   • Diet Order     Standing Status:   Standing     Number of Occurrences:   1     Order Specific Question:   Diet:     Answer:   Regular [1]     Order  Specific Question:   Texture/Fiber modifications:     Answer:   Dysphagia 3(Mechanical Soft)specify fluid consistency(question 6) [3]     Physical Exam   Constitutional: He appears well-developed. No distress.   HENT:   Head: Normocephalic and atraumatic.   Nose: Nose normal.   Eyes: Conjunctivae and EOM are normal. No scleral icterus.   Neck: Neck supple. No JVD present.   Cardiovascular: Normal rate and regular rhythm.    No murmur heard.  Pulmonary/Chest: Effort normal. No stridor. He has no wheezes. He has no rales.   Abdominal: Soft. Bowel sounds are normal. He exhibits no distension. There is no tenderness. There is no rebound.   Musculoskeletal: He exhibits no edema, tenderness or deformity.   Neurological: He is alert.   Demented- ox 1.    Skin: Skin is warm and dry. He is not diaphoretic. No pallor.   Psychiatric: His affect is not blunt and not labile. He exhibits a depressed mood.   frustrated   Vitals reviewed.          Recent Labs      01/27/18   0807   SODIUM  136   POTASSIUM  4.0   CHLORIDE  105   CO2  24   GLUCOSE  99   BUN  8   CREATININE  0.83   CALCIUM  9.2                      Assessment/Plan     * FTT (failure to thrive) in adult- (present on admission)   Assessment & Plan    Wife unable to care for him at home  SS to assist with placement- consider memory care center, snf or other group home.  Referral sent to [facility] skilled 1/28/18        Hemoptysis   Assessment & Plan    Resolved. 1/24/18--Had small bright red blood clog after coughing this morning, stable vitals, on RA  CBC with stable h/h noted to be 13.3/38.5 respectively on 1/23/18,  no white count, plt 177, renal function is normal.      CXR, CBC, CMP, Pt/INR all normal 1/24/15  monitor        Depression   Assessment & Plan    abilify and lexapro , monitor          Hypokalemia   Assessment & Plan    Replaced and now stable        Vision loss of left eye   Assessment & Plan    CT head neg, MRI brain - no acute CVA, evid for prior stroke.  No  eye pain or signs of infxn.  Add asa, statin  Monitor         Suicidal ideation- (present on admission)   Assessment & Plan    No active SI. - felt likely assoc dementia and agitation with previous associations with his son who committed suicide.  Psychiatry consulted appreciate rec.         Irritability- (present on admission)   Assessment & Plan      Continue with psych meds- Abilify monitor response.  Psych on board        Asymptomatic bacteriuria- (present on admission)   Assessment & Plan    Monitor for acute symptoms.         Hypernatremia- (present on admission)   Assessment & Plan    Resolved:Hypovolemic - corrected w .45 ns-- now normalized  Encourage intake.          Late onset Alzheimer's disease with behavioral disturbance- (present on admission)   Assessment & Plan    Periods of agitation, encourage frequent re-orientation  Donepezil, escitalopram, memantine  dc Geodon for agiation , monitor at this time  Re directioning   for Dc planning.         Discussed case with nurse, SHANITA ( referral sent 1/28/18)    Reviewed items::  Labs reviewed, Radiology images reviewed and Medications reviewed  Shaw catheter::  No Shaw  DVT prophylaxis pharmacological::  Heparin        BMP normal 1/28/18, cr improving

## 2018-01-29 NOTE — CARE PLAN
Problem: Communication  Goal: The ability to communicate needs accurately and effectively will improve    Intervention: [place] patient and significant other/support system to call light to alert staff of needs  Patient calls very frequently, forgetful at times, educated on importance of calling prior to ambulating. Fall precautions in place. Safety maintained      Problem: Venous Thromboembolism (VTW)/Deep Vein Thrombosis (DVT) Prevention:  Goal: Patient will participate in Venous Thrombosis (VTE)/Deep Vein Thrombosis (DVT)Prevention Measures    Intervention: Encourage patient to perform ankle flex, foot rotation, and knee flex exercises in addition to other prophylatic measures every hour while awake  RN encouraged patient to perform foot exercises, get up and sit in chair or ambulate without effect. Patient is sitting upright in bed. Receiving lovenox for VTE prophylaxis

## 2018-01-29 NOTE — DISCHARGE PLANNING
Medical Social Work  [phone] stated he visited patient, seems he looks good.  Will have his wife review before taking him, possible today or tomorrow. Asked I call him tomorrow for an update.

## 2018-01-29 NOTE — CARE PLAN
Problem: Safety  Goal: Will remain free from falls  Bed alarm in place, bed in low and locked position, non-slip socks on patient, hourly rounding in place.     Problem: Venous Thromboembolism (VTW)/Deep Vein Thrombosis (DVT) Prevention:  Goal: Patient will participate in Venous Thrombosis (VTE)/Deep Vein Thrombosis (DVT)Prevention Measures  Patient ambulating occasionally, encouraged to perform ROM when in bed for long periods of time.

## 2018-01-29 NOTE — PROGRESS NOTES
Assumed care of patient. Disoriented to time and situation, baseline confusion. Denies pain/discomfort. Denies SOB. Denies nausea, tolerating diet well. Fall precautions in place. Safety maintained. Patient asking to call daughter-in-law, will attempt to contact.

## 2018-01-30 PROCEDURE — G8980 MOBILITY D/C STATUS: HCPCS | Mod: CI

## 2018-01-30 PROCEDURE — 700102 HCHG RX REV CODE 250 W/ 637 OVERRIDE(OP): Performed by: PSYCHIATRY & NEUROLOGY

## 2018-01-30 PROCEDURE — 700102 HCHG RX REV CODE 250 W/ 637 OVERRIDE(OP): Performed by: HOSPITALIST

## 2018-01-30 PROCEDURE — A9270 NON-COVERED ITEM OR SERVICE: HCPCS | Performed by: PSYCHIATRY & NEUROLOGY

## 2018-01-30 PROCEDURE — A9270 NON-COVERED ITEM OR SERVICE: HCPCS | Performed by: HOSPITALIST

## 2018-01-30 PROCEDURE — G8979 MOBILITY GOAL STATUS: HCPCS | Mod: CI

## 2018-01-30 PROCEDURE — 700102 HCHG RX REV CODE 250 W/ 637 OVERRIDE(OP): Performed by: INTERNAL MEDICINE

## 2018-01-30 PROCEDURE — 770006 HCHG ROOM/CARE - MED/SURG/GYN SEMI*

## 2018-01-30 PROCEDURE — 700111 HCHG RX REV CODE 636 W/ 250 OVERRIDE (IP): Performed by: INTERNAL MEDICINE

## 2018-01-30 PROCEDURE — 99232 SBSQ HOSP IP/OBS MODERATE 35: CPT | Performed by: INTERNAL MEDICINE

## 2018-01-30 PROCEDURE — A9270 NON-COVERED ITEM OR SERVICE: HCPCS | Performed by: INTERNAL MEDICINE

## 2018-01-30 RX ADMIN — Medication 1000 MCG: at 09:00

## 2018-01-30 RX ADMIN — ENOXAPARIN SODIUM 40 MG: 100 INJECTION SUBCUTANEOUS at 09:01

## 2018-01-30 RX ADMIN — POTASSIUM CHLORIDE 20 MEQ: 1500 TABLET, EXTENDED RELEASE ORAL at 09:00

## 2018-01-30 RX ADMIN — ASPIRIN 81 MG: 81 TABLET, CHEWABLE ORAL at 09:00

## 2018-01-30 RX ADMIN — MEMANTINE HYDROCHLORIDE 10 MG: 10 TABLET ORAL at 09:01

## 2018-01-30 RX ADMIN — DONEPEZIL HYDROCHLORIDE 5 MG: 5 TABLET, FILM COATED ORAL at 18:06

## 2018-01-30 RX ADMIN — MONTELUKAST SODIUM 10 MG: 10 TABLET, COATED ORAL at 09:01

## 2018-01-30 RX ADMIN — ESCITALOPRAM OXALATE 20 MG: 10 TABLET ORAL at 09:00

## 2018-01-30 RX ADMIN — MEMANTINE HYDROCHLORIDE 10 MG: 10 TABLET ORAL at 20:27

## 2018-01-30 RX ADMIN — SIMVASTATIN 10 MG: 20 TABLET, FILM COATED ORAL at 20:26

## 2018-01-30 RX ADMIN — ARIPIPRAZOLE 2 MG: 2 TABLET ORAL at 09:11

## 2018-01-30 ASSESSMENT — ENCOUNTER SYMPTOMS
WEAKNESS: 1
FEVER: 0
DEPRESSION: 0
ABDOMINAL PAIN: 0
COUGH: 0

## 2018-01-30 ASSESSMENT — PAIN SCALES - GENERAL: PAINLEVEL_OUTOF10: 0

## 2018-01-30 NOTE — CARE PLAN
Problem: Venous Thromboembolism (VTW)/Deep Vein Thrombosis (DVT) Prevention:  Goal: Patient will participate in Venous Thrombosis (VTE)/Deep Vein Thrombosis (DVT)Prevention Measures  Outcome: PROGRESSING AS EXPECTED  Lovenox shot given, ambulates at times to BR and to RN station    Problem: Discharge Barriers/Planning  Goal: Patient's continuum of care needs will be met  Outcome: PROGRESSING AS EXPECTED  Pt is possible to DC to [facility] on board

## 2018-01-30 NOTE — PROGRESS NOTES
Pt is AOx4, no complains of pain, tolerated all oral medications, skin and sacral assessment done, on RA, call light in use, bed alarm on, charge RN aware, treaded socks on, bed locked in low position, plan of care discussed, all needs met at this time.

## 2018-01-30 NOTE — PROGRESS NOTES
Received report from day shift RN. Assumed care.    Patient sitting outside by nurses station at this time.

## 2018-01-30 NOTE — PROGRESS NOTES
Patient is alert and oriented to self, pleasantly confused as endorsed. Able to make needs known. Assessment completed. On room air, tolerating well. Denies any pain and discomfort at this time. Patient educated regarding plan of care. Patient a standby assist with cane, steady gait. Bed alarm in place and functioning. Bed locked, in lowest position, treaded socks on. Needs attended. No further needs at this time. Call light and personal belongings within reach.

## 2018-01-30 NOTE — THERAPY
Checked on pt; he continues to ambulate ad becky with nrsg with and without SPC. At this time, pt does not have further acute skilled PT need. Will DC PT services.

## 2018-01-30 NOTE — CARE PLAN
Problem: Safety  Goal: Will remain free from injury  Outcome: PROGRESSING AS EXPECTED  Safety precautions in place. Non skid socks on. Bed alarm in place and functioning. Call light within reach. Bi hourly rounding in place.

## 2018-01-31 PROCEDURE — A9270 NON-COVERED ITEM OR SERVICE: HCPCS | Performed by: INTERNAL MEDICINE

## 2018-01-31 PROCEDURE — A9270 NON-COVERED ITEM OR SERVICE: HCPCS | Performed by: HOSPITALIST

## 2018-01-31 PROCEDURE — 700102 HCHG RX REV CODE 250 W/ 637 OVERRIDE(OP): Performed by: PSYCHIATRY & NEUROLOGY

## 2018-01-31 PROCEDURE — 700102 HCHG RX REV CODE 250 W/ 637 OVERRIDE(OP): Performed by: HOSPITALIST

## 2018-01-31 PROCEDURE — 700102 HCHG RX REV CODE 250 W/ 637 OVERRIDE(OP): Performed by: INTERNAL MEDICINE

## 2018-01-31 PROCEDURE — 99232 SBSQ HOSP IP/OBS MODERATE 35: CPT | Performed by: INTERNAL MEDICINE

## 2018-01-31 PROCEDURE — A9270 NON-COVERED ITEM OR SERVICE: HCPCS | Performed by: PSYCHIATRY & NEUROLOGY

## 2018-01-31 PROCEDURE — 99232 SBSQ HOSP IP/OBS MODERATE 35: CPT | Mod: GC | Performed by: INTERNAL MEDICINE

## 2018-01-31 PROCEDURE — 700111 HCHG RX REV CODE 636 W/ 250 OVERRIDE (IP): Performed by: INTERNAL MEDICINE

## 2018-01-31 PROCEDURE — 770006 HCHG ROOM/CARE - MED/SURG/GYN SEMI*

## 2018-01-31 RX ADMIN — ESCITALOPRAM OXALATE 20 MG: 10 TABLET ORAL at 08:57

## 2018-01-31 RX ADMIN — ENOXAPARIN SODIUM 40 MG: 100 INJECTION SUBCUTANEOUS at 08:57

## 2018-01-31 RX ADMIN — ARIPIPRAZOLE 2 MG: 2 TABLET ORAL at 09:02

## 2018-01-31 RX ADMIN — MEMANTINE HYDROCHLORIDE 10 MG: 10 TABLET ORAL at 20:00

## 2018-01-31 RX ADMIN — SIMVASTATIN 10 MG: 20 TABLET, FILM COATED ORAL at 20:00

## 2018-01-31 RX ADMIN — ASPIRIN 81 MG: 81 TABLET, CHEWABLE ORAL at 08:57

## 2018-01-31 RX ADMIN — MEMANTINE HYDROCHLORIDE 10 MG: 10 TABLET ORAL at 08:57

## 2018-01-31 RX ADMIN — MONTELUKAST SODIUM 10 MG: 10 TABLET, COATED ORAL at 08:57

## 2018-01-31 RX ADMIN — Medication 1000 MCG: at 08:57

## 2018-01-31 RX ADMIN — POTASSIUM CHLORIDE 20 MEQ: 1500 TABLET, EXTENDED RELEASE ORAL at 08:57

## 2018-01-31 ASSESSMENT — ENCOUNTER SYMPTOMS
ABDOMINAL PAIN: 0
WEAKNESS: 1
DEPRESSION: 0
FEVER: 0
COUGH: 0

## 2018-01-31 ASSESSMENT — PAIN SCALES - GENERAL
PAINLEVEL_OUTOF10: 0

## 2018-01-31 NOTE — DISCHARGE PLANNING
MSW spoke with Matt from [facility]. Matt states they can accept pt to their group home called [facility] on [address] in [place]. Pt is pending Group home waany. MSW left message for Makalya lopez's SW through aging and disability services to see where they were in the process. Phone call with  Tena about possible payment through approved services for $1,619.00, until Medicaid waiver is in place. MSW completed approved services and emailed to management.    Dr. Chadwick checked on pt and would like a follow up appointment with a PCP scheduled for pt after d/c.

## 2018-01-31 NOTE — CARE PLAN
Problem: Safety  Goal: Will remain free from injury  Outcome: PROGRESSING AS EXPECTED  Safety precautions in place. Non skid socks on. Bed alarm in place and functioning. Room near nursing station. Call light within reach. Bi hourly rounding in place.

## 2018-01-31 NOTE — PROGRESS NOTES
AAOx3, disoriented to time - pleasant. C/o 0/10 pain, declining intervention at this time. -N/V. -N/T. Denies new onset of chest pain/SOB. +BS in all 4 quadrants, last BM yesterday per pt. Standby assist w/ cane, steady. POC discussed, denies further needs at this time. Bed alarm on, call light within reach & hourly rounding in place.

## 2018-01-31 NOTE — PROGRESS NOTES
"Geriatric Progress Note    Chief Complaint   Patient presents with   • Psych Eval     Today's Date: 1/31/2018  Patient Name: Lui Denise  Current Attending: Kobi King M.D.    Subjective:  Patient was interviewed in his room, he was on bed, awake, not in distress, denies any pain, constipation, diarrhea or headache. Kept asking about his wife and for the time he will be able to leave the hospital. Couldn't remember when he was visited by his wife.     There is no episode of agitations or misbehavior for the last 2 weeks, there is no significant changes in his mental health since decrease in Donepezil and start of Aripiprazole according to the staff. He is also on Escitalopram and Memantine from home.     We discussed with his attending about discontinuing Donepezil and monitor for mental health, and for future plan if patient shows major depression to increase the dose of Aripiprazole and consider adding other agents. Recommend the patient to be followed by a PCP after the discharge.       reports that the patient is for discharge possible tomorrow to [facility], and the representative is coming today to interview the patient.       Medical Team:  Primary: Internal Medicine - [facility]  Consultants:  PT    Activity Level:   Light    Activities of Daily Living:   Independent      ROS: The items below were reported to be negative, unless otherwise noted above or is in bold type.   Constipation  Diarrhea  Urination  Pain  SOB  Fluctuation of mental status   Vision   Hearing  Nausea  Vomiting  Motor issues.  Sensory issues  Runny nose  Memory issues      Objective:  Physical Exam:   BP (!) 98/69   Pulse 64   Temp 36.6 °C (97.9 °F)   Resp 17   Ht 1.6 m (5' 3\")   Wt 62.3 kg (137 lb 5.6 oz)   SpO2 97%   BMI 24.33 kg/m²     General: thin/frail/appears.. Alert and oriented X to self, person, year, month and place.  Lungs: breathing comfortably, not in distress or shortness of breath. normal effort  Hands: " osteoporotic changes to bilateral hands noted with Herberben's and Toy's nodes  Neurological Exam: no focal deficit  Psych: feel abandoned.  Gait: he  Refuse to stand and walk . But he it stable from previous exams.    MOCA test: score 21/30  Score poor in abstraction and memory.  Delirium screening was negative, patient is attentive.     Assessment and Plan:   #Vascular dementia  #b12 Deficiency  -recommend discontinue Donepezil, and repeat MOCA as outpatient setting and compare the result.   -consider discontinue Memantine in the outpatient setting as the benefit is minimal in vascular dementia.     #Adjustment Disorder with mixed features  -continue on escitalopram and abilify for boost the antidepressant effect as recommended by psych  -recommend consider increasing the dose of Abilify if patient shows major depression symptoms before adding another agent.     #Disposition   -Patient is for discharge tomorrow. Need to have follow up with PCP prefer geriatrician PCP.   -[facility], and the representative is coming today to interview the patient.       Karley Huggins M.D.  Geriatrics - [employer]  Please feel free to contact me with any questions.  Extension [phone]   8:00-5:00 Monday - Friday (excluding holidays)

## 2018-01-31 NOTE — CARE PLAN
Problem: Safety  Goal: Will remain free from injury  Outcome: PROGRESSING AS EXPECTED  Bed alarm on, call light within reach & hourly rounding in place. Educated to call for assistance prior to getting out of bed, pt verbalized understanding.     Problem: Venous Thromboembolism (VTW)/Deep Vein Thrombosis (DVT) Prevention:  Goal: Patient will participate in Venous Thrombosis (VTE)/Deep Vein Thrombosis (DVT)Prevention Measures  Outcome: PROGRESSING AS EXPECTED  Administered scheduled AM Lovenox. Encouraging ambulation.

## 2018-01-31 NOTE — PROGRESS NOTES
Patient on bed resting. Denies any pain and discomfort at this time. Due medications given, tolerated well. Needs attended. No further needs at this time. POC discussed. Bed alarm in place and functioning. Bed locked, in lowest position, treaded socks on. Room near nursing station. Call light within reach.

## 2018-01-31 NOTE — PROGRESS NOTES
[facility] Hospitalist Progress Note    Date of Service: 2018    Chief Complaint  82 y.o. Male w/h/o Alzheimer's, stroke  admitted 1/15/2018 with wife reporting she can no longer care for him.  Had SI and findings of hypernatremia. MRI brain w findings of old CVA  no acute infarction. Hypernatremia corrected.      Interval Problem Update    Patient seen and examined. Patient appears comfortable. I discussed the plan of care with Geriatrics who recommend discontinuing aricept. His vitals remain stable. Awaiting placement.     Consultants/Specialty  Geriatrics   Psychiatry    Disposition  Anticipate will need Group home vs Snf. Referral sent 18 to [facility] skilled        Review of Systems   Unable to perform ROS: Dementia   Constitutional: Negative for fever and malaise/fatigue.             HENT: Positive for hearing loss. Negative for congestion.    Respiratory: Negative for cough.    Cardiovascular: Negative for chest pain.   Gastrointestinal: Negative for abdominal pain.   Neurological: Positive for weakness.   Psychiatric/Behavioral: Negative for depression and suicidal ideas.      Physical Exam  Laboratory/Imaging   Hemodynamics  Temp (24hrs), Av °C (98.6 °F), Min:36.6 °C (97.9 °F), Max:37.4 °C (99.3 °F)   Temperature: 36.6 °C (97.9 °F)  Pulse  Av.4  Min: 59  Max: 108    Blood Pressure : (!) 98/69      Respiratory      Respiration: 17, Pulse Oximetry: 97 %        RUL Breath Sounds: Clear, RML Breath Sounds: Clear, RLL Breath Sounds: Diminished, SANTY Breath Sounds: Clear, LLL Breath Sounds: Diminished    Fluids    Intake/Output Summary (Last 24 hours) at 18 1313  Last data filed at 18 0900   Gross per 24 hour   Intake              720 ml   Output              800 ml   Net              -80 ml       Nutrition  Orders Placed This Encounter   Procedures   • Diet Order     Standing Status:   Standing     Number of Occurrences:   1     Order Specific Question:   Diet:     Answer:   Regular [1]      Order Specific Question:   Texture/Fiber modifications:     Answer:   Dysphagia 3(Mechanical Soft)specify fluid consistency(question 6) [3]     Physical Exam   Constitutional: He appears well-developed. No distress.   HENT:   Head: Normocephalic and atraumatic.   Nose: Nose normal.   Eyes: Conjunctivae and EOM are normal. No scleral icterus.   Neck: Neck supple. No JVD present.   Cardiovascular: Normal rate and regular rhythm.    No murmur heard.  Pulmonary/Chest: Effort normal. No stridor. He has no wheezes. He has no rales.   Abdominal: Soft. Bowel sounds are normal. He exhibits no distension. There is no tenderness. There is no rebound.   Musculoskeletal: He exhibits no edema, tenderness or deformity.   Neurological: He is alert.   Demented- ox 1.    Skin: Skin is warm and dry. He is not diaphoretic. No pallor.   Psychiatric: His affect is not blunt and not labile. He is withdrawn. Cognition and memory are impaired. He exhibits a depressed mood.   Nursing note and vitals reviewed.                               Assessment/Plan     * FTT (failure to thrive) in adult- (present on admission)   Assessment & Plan    Wife unable to care for him at home  SS to assist with placement- consider memory care center, snf or other group home.  Referral sent to [facility] skilled 1/28/18        Hemoptysis   Assessment & Plan    Resolved. 1/24/18--Had small bright red blood clog after coughing this morning, stable vitals, on RA  CBC with stable h/h noted to be 13.3/38.5 respectively on 1/23/18,  no white count, plt 177, renal function is normal.      CXR, CBC, CMP, Pt/INR all normal 1/24/15  monitor        Depression- (present on admission)   Assessment & Plan    abilify and lexapro , monitor          Hypokalemia   Assessment & Plan    Replaced and now stable        Vision loss of left eye- (present on admission)   Assessment & Plan    CT head neg, MRI brain - no acute CVA, evid for prior stroke.  No eye pain or signs of infxn.  Add  asa, statin  Monitor         Suicidal ideation- (present on admission)   Assessment & Plan    No active SI. - felt likely assoc dementia and agitation with previous associations with his son who committed suicide.  Psychiatry consulted appreciate rec.         Irritability- (present on admission)   Assessment & Plan      Continue with psych meds- Abilify monitor response.  Psych on board        Asymptomatic bacteriuria- (present on admission)   Assessment & Plan    Monitor for acute symptoms.         Hypernatremia- (present on admission)   Assessment & Plan    Resolved:Hypovolemic - corrected w .45 ns-- now normalized  DC IVF  Encourage intake.          Late onset Alzheimer's disease with behavioral disturbance- (present on admission)   Assessment & Plan    Periods of agitation, encourage frequent re-orientation  DC Donepezil as no improvement in memory noted  Continue escitalopram, memantine  dc Geodon for agiation , monitor at this time  Re directioning   for Dc planning.             Reviewed items::  Labs reviewed, Radiology images reviewed and Medications reviewed  Shaw catheter::  No Shaw  DVT prophylaxis pharmacological::  Heparin        Patient plan of care discussed at multidisplinary team rounds, with patient and R.N at beside.

## 2018-02-01 PROCEDURE — 770006 HCHG ROOM/CARE - MED/SURG/GYN SEMI*

## 2018-02-01 PROCEDURE — A9270 NON-COVERED ITEM OR SERVICE: HCPCS | Performed by: HOSPITALIST

## 2018-02-01 PROCEDURE — 700102 HCHG RX REV CODE 250 W/ 637 OVERRIDE(OP): Performed by: HOSPITALIST

## 2018-02-01 PROCEDURE — 99232 SBSQ HOSP IP/OBS MODERATE 35: CPT | Performed by: INTERNAL MEDICINE

## 2018-02-01 PROCEDURE — A9270 NON-COVERED ITEM OR SERVICE: HCPCS | Performed by: INTERNAL MEDICINE

## 2018-02-01 PROCEDURE — A9270 NON-COVERED ITEM OR SERVICE: HCPCS | Performed by: PSYCHIATRY & NEUROLOGY

## 2018-02-01 PROCEDURE — 700102 HCHG RX REV CODE 250 W/ 637 OVERRIDE(OP): Performed by: INTERNAL MEDICINE

## 2018-02-01 PROCEDURE — 700102 HCHG RX REV CODE 250 W/ 637 OVERRIDE(OP): Performed by: PSYCHIATRY & NEUROLOGY

## 2018-02-01 PROCEDURE — 700111 HCHG RX REV CODE 636 W/ 250 OVERRIDE (IP): Performed by: INTERNAL MEDICINE

## 2018-02-01 RX ADMIN — ASPIRIN 81 MG: 81 TABLET, CHEWABLE ORAL at 09:01

## 2018-02-01 RX ADMIN — MEMANTINE HYDROCHLORIDE 10 MG: 10 TABLET ORAL at 09:01

## 2018-02-01 RX ADMIN — SIMVASTATIN 10 MG: 20 TABLET, FILM COATED ORAL at 19:54

## 2018-02-01 RX ADMIN — Medication 1000 MCG: at 09:01

## 2018-02-01 RX ADMIN — ESCITALOPRAM OXALATE 20 MG: 10 TABLET ORAL at 09:01

## 2018-02-01 RX ADMIN — MEMANTINE HYDROCHLORIDE 10 MG: 10 TABLET ORAL at 19:54

## 2018-02-01 RX ADMIN — ENOXAPARIN SODIUM 40 MG: 100 INJECTION SUBCUTANEOUS at 09:01

## 2018-02-01 RX ADMIN — ERGOCALCIFEROL 50000 UNITS: 1.25 CAPSULE ORAL at 13:34

## 2018-02-01 RX ADMIN — ARIPIPRAZOLE 2 MG: 2 TABLET ORAL at 09:03

## 2018-02-01 RX ADMIN — POTASSIUM CHLORIDE 20 MEQ: 1500 TABLET, EXTENDED RELEASE ORAL at 09:01

## 2018-02-01 RX ADMIN — MONTELUKAST SODIUM 10 MG: 10 TABLET, COATED ORAL at 09:01

## 2018-02-01 ASSESSMENT — ENCOUNTER SYMPTOMS
DEPRESSION: 0
FEVER: 0
ABDOMINAL PAIN: 0
COUGH: 0
WEAKNESS: 1

## 2018-02-01 ASSESSMENT — PAIN SCALES - GENERAL
PAINLEVEL_OUTOF10: 0
PAINLEVEL_OUTOF10: 0

## 2018-02-01 NOTE — PROGRESS NOTES
Brief Geriatric Note:  Saw a patient today. Appears to be tolerating the withholding of donepezil. HR is stable so far. No signs of delirium on exam as patient has good attention and he does not appear to have a fluctuating course. Alert and oriented X3 today ( thought it was [facility] and thinks the name of the hospital is [facility]) We walked around the unit today and his gait was stable. May d/c to group home soon. Will continue to see patient as time allows. He is doing well from a geriatric perspective. Needs outpatient f/u for further consideration of Namenda wean and to assess the efficacy of his aripiprazole and escitalopram.     Noe Chadwick M.D.  Geriatrics

## 2018-02-01 NOTE — DISCHARGE PLANNING
Medical Social Work    PC to Makayla at [facility] [phone]: message left that patient will d/c tomorrow and where he is d/c to.  Requested she call me back.     PC to Matt [phone]:he is interested in patient, issue is patient does not have Medicaid at this time. Requested to know who Medicaid worker is, told him.  He will call Makayla and discuss patient with is wife.  Requested I email him as it is easier for him, [email or web address].

## 2018-02-01 NOTE — PROGRESS NOTES
Received bedside report and accepted care of patient. Patient currently resting in bed in no visible or stated signs of distress. Bed alarm in place, controls on and bed in locked and lowest position. Call light and personal possessions within reach. Patient educated about use of call light and verbalized understanding.

## 2018-02-01 NOTE — PROGRESS NOTES
[facility] Hospitalist Progress Note    Date of Service: 2018    Chief Complaint  82 y.o. Male w/h/o Alzheimer's, stroke  admitted 1/15/2018 with wife reporting she can no longer care for him.  Had SI and findings of hypernatremia. MRI brain w findings of old CVA  no acute infarction. Hypernatremia corrected.      Interval Problem Update    Doing well, very pleasant and conversant. BP has been marginal however patient remains asymptomatic and denies any CP or SOB.    Consultants/Specialty  Geriatrics   Psychiatry    Disposition  Anticipate will need Group home vs Snf. Referral sent 18 to [facility]        Review of Systems   Unable to perform ROS: Dementia   Constitutional: Negative for fever and malaise/fatigue.             HENT: Positive for hearing loss. Negative for congestion.    Respiratory: Negative for cough.    Cardiovascular: Negative for chest pain.   Gastrointestinal: Negative for abdominal pain.   Neurological: Positive for weakness.   Psychiatric/Behavioral: Negative for depression and suicidal ideas.      Physical Exam  Laboratory/Imaging   Hemodynamics  Temp (24hrs), Av.5 °C (97.7 °F), Min:36.2 °C (97.2 °F), Max:36.8 °C (98.2 °F)   Temperature: 36.4 °C (97.6 °F)  Pulse  Av.5  Min: 59  Max: 108    Blood Pressure : (!) 90/71 (rn aware)      Respiratory      Respiration: 16, Pulse Oximetry: 96 %        RUL Breath Sounds: Clear, RML Breath Sounds: Clear, RLL Breath Sounds: Diminished, SANTY Breath Sounds: Clear, LLL Breath Sounds: Diminished    Fluids    Intake/Output Summary (Last 24 hours) at 18 1416  Last data filed at 18 0900   Gross per 24 hour   Intake             1020 ml   Output             1150 ml   Net             -130 ml       Nutrition  Orders Placed This Encounter   Procedures   • Diet Order     Standing Status:   Standing     Number of Occurrences:   1     Order Specific Question:   Diet:     Answer:   Regular [1]     Order Specific Question:   Texture/Fiber  modifications:     Answer:   Dysphagia 3(Mechanical Soft)specify fluid consistency(question 6) [3]     Physical Exam   Constitutional: He appears well-developed. No distress.   HENT:   Head: Normocephalic and atraumatic.   Nose: Nose normal.   Eyes: Conjunctivae and EOM are normal. No scleral icterus.   Neck: Neck supple. No JVD present.   Cardiovascular: Normal rate and regular rhythm.    No murmur heard.  Pulmonary/Chest: Effort normal. No stridor. He has no wheezes. He has no rales.   Abdominal: Soft. Bowel sounds are normal. He exhibits no distension. There is no tenderness. There is no rebound.   Musculoskeletal: He exhibits no edema, tenderness or deformity.   Neurological: He is alert.   Demented- ox 1.    Skin: Skin is warm and dry. He is not diaphoretic. No pallor.   Psychiatric: His affect is not blunt and not labile. He is withdrawn. Cognition and memory are impaired. He exhibits a depressed mood.   Nursing note and vitals reviewed.                               Assessment/Plan     * FTT (failure to thrive) in adult- (present on admission)   Assessment & Plan    Wife unable to care for him at home  SS to assist with placement- consider memory care center, snf or other group home.  Referral sent to [facility] skilled 1/28/18        Hemoptysis   Assessment & Plan    Resolved. 1/24/18--Had small bright red blood clog after coughing this morning, stable vitals, on RA  CBC with stable h/h noted to be 13.3/38.5 respectively on 1/23/18,  no white count, plt 177, renal function is normal.      CXR, CBC, CMP, Pt/INR all normal 1/24/15  monitor        Depression- (present on admission)   Assessment & Plan    abilify and lexapro , monitor          Hypokalemia   Assessment & Plan    Replaced and now stable        Vision loss of left eye- (present on admission)   Assessment & Plan    CT head neg, MRI brain - no acute CVA, evid for prior stroke.  No eye pain or signs of infxn.  Add asa, statin  Monitor         Suicidal  ideation- (present on admission)   Assessment & Plan    No active SI. - felt likely assoc dementia and agitation with previous associations with his son who committed suicide.  Psychiatry consulted appreciate rec.         Irritability- (present on admission)   Assessment & Plan      Continue with psych meds- Abilify monitor response.  Psych on board        Asymptomatic bacteriuria- (present on admission)   Assessment & Plan    Monitor for acute symptoms.         Hypernatremia- (present on admission)   Assessment & Plan    Resolved:Hypovolemic - corrected w .45 ns-- now normalized  DC IVF  Encourage intake.          Late onset Alzheimer's disease with behavioral disturbance- (present on admission)   Assessment & Plan    Periods of agitation, encourage frequent re-orientation  DC Donepezil as no improvement in memory noted  Continue escitalopram, memantine  dc Geodon for agiation , monitor at this time  Re directioning   for Dc planning.             Reviewed items::  Labs reviewed, Radiology images reviewed and Medications reviewed  Shaw catheter::  No Shaw  DVT prophylaxis pharmacological::  Heparin        Patient plan of care discussed at multidisplinary team rounds, with patient and R.N at beside.

## 2018-02-01 NOTE — DISCHARGE PLANNING
The patient was discussed during the Long Length of Stay conference call this morning.  When Espinoza, our , had spoken to Matt from the [facility] [address] group home 3 days ago he was told that Matt could take the patient and that Matt would contact the patient's  from [facility].  Espinoza gave Matt the name, Makayla, and phone number for the .  Today when Espinoza spoke to Matt he told Espinoza that he would need the name of the patient's  so he can call her.  Espinoza realized that Matt had done nothing about taking this patient since they spoke 3 days ago.  So Espinoza is going to continue calling group homes to see if he can get some of them come and evaluate the patient.  The monthly charge at [facility] [address] was to be $1619 and [facility] had agreed to pay for 3 months under an approved service until the Medicaid waiver went through.  So we will need to find a group home that charges around the same amount in order for [facility] to pay for the first 3 months.

## 2018-02-01 NOTE — CARE PLAN
Problem: Safety  Goal: Will remain free from injury  Outcome: PROGRESSING AS EXPECTED  Pt will remain free from injury, progressing as expected.    Problem: Psychosocial Needs:  Goal: Level of anxiety will decrease    Intervention: Identify and develop with patient strategies to cope with anxiety triggers  Pt assessed and identified strategies to cope with anxiety triggers.

## 2018-02-02 PROCEDURE — A9270 NON-COVERED ITEM OR SERVICE: HCPCS | Performed by: PSYCHIATRY & NEUROLOGY

## 2018-02-02 PROCEDURE — 700102 HCHG RX REV CODE 250 W/ 637 OVERRIDE(OP): Performed by: HOSPITALIST

## 2018-02-02 PROCEDURE — 700102 HCHG RX REV CODE 250 W/ 637 OVERRIDE(OP): Performed by: PSYCHIATRY & NEUROLOGY

## 2018-02-02 PROCEDURE — A9270 NON-COVERED ITEM OR SERVICE: HCPCS | Performed by: INTERNAL MEDICINE

## 2018-02-02 PROCEDURE — A9270 NON-COVERED ITEM OR SERVICE: HCPCS | Performed by: HOSPITALIST

## 2018-02-02 PROCEDURE — 770006 HCHG ROOM/CARE - MED/SURG/GYN SEMI*

## 2018-02-02 PROCEDURE — 99232 SBSQ HOSP IP/OBS MODERATE 35: CPT | Performed by: INTERNAL MEDICINE

## 2018-02-02 PROCEDURE — 700102 HCHG RX REV CODE 250 W/ 637 OVERRIDE(OP): Performed by: INTERNAL MEDICINE

## 2018-02-02 RX ADMIN — SIMVASTATIN 10 MG: 20 TABLET, FILM COATED ORAL at 19:30

## 2018-02-02 RX ADMIN — ASPIRIN 81 MG: 81 TABLET, CHEWABLE ORAL at 09:06

## 2018-02-02 RX ADMIN — Medication 1000 MCG: at 09:06

## 2018-02-02 RX ADMIN — MEMANTINE HYDROCHLORIDE 10 MG: 10 TABLET ORAL at 19:31

## 2018-02-02 RX ADMIN — MONTELUKAST SODIUM 10 MG: 10 TABLET, COATED ORAL at 09:07

## 2018-02-02 RX ADMIN — POTASSIUM CHLORIDE 20 MEQ: 1500 TABLET, EXTENDED RELEASE ORAL at 09:07

## 2018-02-02 RX ADMIN — ESCITALOPRAM OXALATE 20 MG: 10 TABLET ORAL at 09:07

## 2018-02-02 RX ADMIN — ARIPIPRAZOLE 2 MG: 2 TABLET ORAL at 09:10

## 2018-02-02 RX ADMIN — MEMANTINE HYDROCHLORIDE 10 MG: 10 TABLET ORAL at 09:07

## 2018-02-02 ASSESSMENT — PAIN SCALES - GENERAL
PAINLEVEL_OUTOF10: 0
PAINLEVEL_OUTOF10: 0

## 2018-02-02 ASSESSMENT — ENCOUNTER SYMPTOMS
DEPRESSION: 0
FEVER: 0
WEAKNESS: 1
ABDOMINAL PAIN: 0
COUGH: 0

## 2018-02-02 NOTE — PROGRESS NOTES
Bedside report received. Patient in bed with no respiratory distress noted. Even rise and fall of chest. Bed alarm in place, controls on and bed in locked and lowest position. Call light and personal possessions within reach. Patient educated about use of call light and verbalized understanding.

## 2018-02-02 NOTE — CARE PLAN
Problem: Safety  Goal: Will remain free from falls  Pt is SBA and ambulates with cane. Safety precautions in place. Bed in low position, treaded socks on, personal possessions in reach, call light in reach. Bed alarm on.    Problem: Discharge Barriers/Planning  Goal: Patient's continuum of care needs will be met  Pt is pending placement. SW involved.

## 2018-02-02 NOTE — PROGRESS NOTES
[facility] Hospitalist Progress Note    Date of Service: 2018    Chief Complaint  82 y.o. Male w/h/o Alzheimer's, stroke  admitted 1/15/2018 with wife reporting she can no longer care for him.  Had SI and findings of hypernatremia. MRI brain w findings of old CVA  no acute infarction. Hypernatremia corrected.      Interval Problem Update  No acute events overnight. Oriented to person and place. He displays a good mood and appropriate behavior.    Consultants/Specialty  Geriatrics   Psychiatry    Disposition  Anticipate will need Group home vs Snf. Referral sent 18 to [facility] skilled        Review of Systems   Unable to perform ROS: Dementia   Constitutional: Negative for fever and malaise/fatigue.             HENT: Positive for hearing loss. Negative for congestion.    Respiratory: Negative for cough.    Cardiovascular: Negative for chest pain.   Gastrointestinal: Negative for abdominal pain.   Neurological: Positive for weakness.   Psychiatric/Behavioral: Negative for depression and suicidal ideas.      Physical Exam  Laboratory/Imaging   Hemodynamics  Temp (24hrs), Av.9 °C (98.4 °F), Min:36.8 °C (98.2 °F), Max:37 °C (98.6 °F)   Temperature: 36.8 °C (98.2 °F)  Pulse  Av.7  Min: 59  Max: 108    Blood Pressure : 100/72      Respiratory      Respiration: 14, Pulse Oximetry: 96 %        RUL Breath Sounds: Clear, RML Breath Sounds: Clear, RLL Breath Sounds: Diminished, SANTY Breath Sounds: Clear, LLL Breath Sounds: Diminished    Fluids    Intake/Output Summary (Last 24 hours) at 18 1054  Last data filed at 18 0600   Gross per 24 hour   Intake              480 ml   Output             1000 ml   Net             -520 ml       Nutrition  Orders Placed This Encounter   Procedures   • Diet Order     Standing Status:   Standing     Number of Occurrences:   1     Order Specific Question:   Diet:     Answer:   Regular [1]     Order Specific Question:   Texture/Fiber modifications:     Answer:   Dysphagia  3(Mechanical Soft)specify fluid consistency(question 6) [3]     Physical Exam   Constitutional: He appears well-developed. No distress.   HENT:   Head: Normocephalic and atraumatic.   Nose: Nose normal.   Eyes: Conjunctivae and EOM are normal. No scleral icterus.   Neck: Neck supple. No JVD present.   Cardiovascular: Normal rate and regular rhythm.    No murmur heard.  Pulmonary/Chest: Effort normal. No stridor. He has no wheezes. He has no rales.   Abdominal: Soft. Bowel sounds are normal. He exhibits no distension. There is no tenderness. There is no rebound.   Musculoskeletal: He exhibits no edema, tenderness or deformity.   Neurological: He is alert.   Oriented to person and place   Skin: Skin is warm and dry. He is not diaphoretic. No pallor.   Psychiatric: His affect is not blunt and not labile. Cognition and memory are impaired.   Nursing note and vitals reviewed.                               Assessment/Plan     * FTT (failure to thrive) in adult- (present on admission)   Assessment & Plan    Wife unable to care for him at home  SS to assist with placement- consider memory care center, snf or other group home.  Referral sent to [facility] skilled 1/28/18        Hemoptysis   Assessment & Plan    Resolved. 1/24/18--Had small bright red blood clog after coughing this morning, stable vitals, on RA  CBC with stable h/h noted to be 13.3/38.5 respectively on 1/23/18,  no white count, plt 177, renal function is normal.      CXR, CBC, CMP, Pt/INR all normal 1/24/15  monitor        Depression- (present on admission)   Assessment & Plan    abilify and lexapro , monitor          Hypokalemia   Assessment & Plan    Replaced and now stable        Vision loss of left eye- (present on admission)   Assessment & Plan    CT head neg, MRI brain - no acute CVA, evid for prior stroke.  No eye pain or signs of infxn.  Add asa, statin  Monitor         Suicidal ideation- (present on admission)   Assessment & Plan    No active SI. - felt  likely assoc dementia and agitation with previous associations with his son who committed suicide.  Psychiatry consulted appreciate rec.         Irritability- (present on admission)   Assessment & Plan      Continue with psych meds- Abilify monitor response.  Psych on board        Asymptomatic bacteriuria- (present on admission)   Assessment & Plan    Monitor for acute symptoms.         Hypernatremia- (present on admission)   Assessment & Plan    Resolved:Hypovolemic - corrected w .45 ns-- now normalized  DC IVF  Encourage intake.          Late onset Alzheimer's disease with behavioral disturbance- (present on admission)   Assessment & Plan    Periods of agitation, encourage frequent re-orientation  DC Donepezil as no improvement in memory noted  Continue escitalopram, memantine  dc Geodon for agiation , monitor at this time  Re directioning  SS for Dc planning.             Reviewed items::  Labs reviewed, Radiology images reviewed and Medications reviewed  Shaw catheter::  No Shaw  DVT prophylaxis pharmacological::  Heparin        Patient plan of care discussed at multidisplinary team rounds, with patient and R.N at beside.

## 2018-02-02 NOTE — PROGRESS NOTES
"Assumed care of patient. Disoriented at baseline, oriented to person and place only. Patient denies pain/discomfort. Short term memory noted to be impaired, patient continues to call for \"a half cup of black cofee\" even though staff has brought him numerous cups and they are sitting in front of him. Patient ambulated with steady gait and cane to nursing station table and conversed with other patients appropriately. Patient awaiting placement in group home. Fall precautions in place, safety maintained.  "

## 2018-02-02 NOTE — CARE PLAN
Problem: Safety  Goal: Will remain free from injury    Intervention: Provide assistance with mobility  Pt uses call light. Assist pt ambulation. Pt uses single point cane.       Problem: Respiratory:  Goal: Respiratory status will improve    Intervention: Assess and monitor pulmonary status  Pt on RA. Denies SOB. Encouraged ambulation and ROM.

## 2018-02-02 NOTE — PROGRESS NOTES
Pt AOx2 (disoriented to time/event), denies pain. Pt sitting at the table at the RN station drinking his coffee. Pt back to bed, call light within reach, personal belongings available, bed in lowest position, treaded socks on, and bed alarm on. Hourly rounding in place.

## 2018-02-02 NOTE — DISCHARGE PLANNING
Medical Social Work  PC to [facility], [phone]: while they take the medicaid waiver rates start at $3,000 per month.  PC to [facility]: have openings, Geno will come out to assess the patient today but feels she wont' be out till Monday.     PC to [facility]: no male beds available.

## 2018-02-03 PROCEDURE — A9270 NON-COVERED ITEM OR SERVICE: HCPCS | Performed by: HOSPITALIST

## 2018-02-03 PROCEDURE — 92610 EVALUATE SWALLOWING FUNCTION: CPT

## 2018-02-03 PROCEDURE — 99231 SBSQ HOSP IP/OBS SF/LOW 25: CPT | Performed by: INTERNAL MEDICINE

## 2018-02-03 PROCEDURE — 700102 HCHG RX REV CODE 250 W/ 637 OVERRIDE(OP): Performed by: HOSPITALIST

## 2018-02-03 PROCEDURE — 770006 HCHG ROOM/CARE - MED/SURG/GYN SEMI*

## 2018-02-03 PROCEDURE — 700102 HCHG RX REV CODE 250 W/ 637 OVERRIDE(OP): Performed by: INTERNAL MEDICINE

## 2018-02-03 PROCEDURE — G8997 SWALLOW GOAL STATUS: HCPCS | Mod: CH

## 2018-02-03 PROCEDURE — 700102 HCHG RX REV CODE 250 W/ 637 OVERRIDE(OP): Performed by: PSYCHIATRY & NEUROLOGY

## 2018-02-03 PROCEDURE — A9270 NON-COVERED ITEM OR SERVICE: HCPCS | Performed by: PSYCHIATRY & NEUROLOGY

## 2018-02-03 PROCEDURE — 700111 HCHG RX REV CODE 636 W/ 250 OVERRIDE (IP): Performed by: INTERNAL MEDICINE

## 2018-02-03 PROCEDURE — G8996 SWALLOW CURRENT STATUS: HCPCS | Mod: CI

## 2018-02-03 PROCEDURE — A9270 NON-COVERED ITEM OR SERVICE: HCPCS | Performed by: INTERNAL MEDICINE

## 2018-02-03 RX ADMIN — ESCITALOPRAM OXALATE 20 MG: 10 TABLET ORAL at 09:25

## 2018-02-03 RX ADMIN — POLYETHYLENE GLYCOL 3350 1 PACKET: 17 POWDER, FOR SOLUTION ORAL at 09:25

## 2018-02-03 RX ADMIN — MONTELUKAST SODIUM 10 MG: 10 TABLET, COATED ORAL at 09:25

## 2018-02-03 RX ADMIN — MEMANTINE HYDROCHLORIDE 10 MG: 10 TABLET ORAL at 20:26

## 2018-02-03 RX ADMIN — ENOXAPARIN SODIUM 40 MG: 100 INJECTION SUBCUTANEOUS at 09:25

## 2018-02-03 RX ADMIN — ASPIRIN 81 MG: 81 TABLET, CHEWABLE ORAL at 09:25

## 2018-02-03 RX ADMIN — MEMANTINE HYDROCHLORIDE 10 MG: 10 TABLET ORAL at 09:25

## 2018-02-03 RX ADMIN — Medication 1000 MCG: at 09:25

## 2018-02-03 RX ADMIN — SIMVASTATIN 10 MG: 20 TABLET, FILM COATED ORAL at 20:26

## 2018-02-03 RX ADMIN — POTASSIUM CHLORIDE 20 MEQ: 1500 TABLET, EXTENDED RELEASE ORAL at 09:25

## 2018-02-03 RX ADMIN — ARIPIPRAZOLE 2 MG: 2 TABLET ORAL at 09:25

## 2018-02-03 ASSESSMENT — PAIN SCALES - GENERAL
PAINLEVEL_OUTOF10: 0
PAINLEVEL_OUTOF10: 0

## 2018-02-03 NOTE — PROGRESS NOTES
Patient oriented to person, place, and date, is pleasant and appropriate. Denies pain or nausea, last bowel movement today. Patient ambulates to bathroom with standby assistance and a one pointed cane, is steady on his feet. Hourly rounding in place.

## 2018-02-03 NOTE — CARE PLAN
Problem: Safety  Goal: Will remain free from falls    Intervention: Assess risk factors for falls   02/03/18 0800   OTHER   Fall Risk High Risk to Fall - 2 or more points    Risk for Injury-Any positive answers results in the pt being at high risk for fall related injury Not Applicable   Mobility Status Assessment 0-Ambulates & Transfers Independently. No Assistance Required   History of fall 2   Pt Calls for Assistance No         Problem: Psychosocial Needs:  Goal: Level of anxiety will decrease    Intervention: Identify and develop with patient strategies to cope with anxiety triggers  Working with patient and patient care staff to maintain a calm and comfortable environment for the patient.

## 2018-02-03 NOTE — THERAPY
"  Speech Language Therapy Clinical Swallow Evaluation completed.  Functional Status: Pt seen based on concerns for aspiration with regular/thin diet d/t impaired cognition and edentulous status.   He has been refusing foods that are chopped and per chart has hx FTT.  Pt consumed hard solids mixed with thins as well as hard mechanical solids with no difficulty or s/sx concerning for aspiration demonstrated.  He appears to be at the level for a regular/thin liquid diet.  SLP f/u x1 to ensure tolerance.  Thank you for the consult.   Recommendations - Diet:  Regular/thins                          Strategies: Head of Bed at 90 Degrees                          Medication Administration:    Plan of Care: Will benefit from Speech Therapy x1  Post-Acute Therapy: Discharge to a transitional care facility for continued skilled therapy services.    See \"Rehab Therapy-Acute\" Patient Summary Report for complete documentation.   "

## 2018-02-03 NOTE — CARE PLAN
Problem: Safety  Goal: Will remain free from falls  Outcome: PROGRESSING AS EXPECTED  Strip alarm in use.     Problem: Knowledge Deficit  Goal: Knowledge of the prescribed therapeutic regimen will improve  Outcome: PROGRESSING AS EXPECTED  Spoke to patient about his HS medications

## 2018-02-03 NOTE — PROGRESS NOTES
Received bedside report and accepted care of patient.  Patient currently resting in bed in no visible or stated distress.  Bed controls on and bed in locked position.  Bed alarm on.  Call light and personal possessions within reach.  Plan of care to include keeping patient safe and calm, assistance with ADL,s, and continued discharge planning efforts.  Patient verbalizes agreement with plan of care, and has no additional questions or concerns at this time.  Will continue to update notes/plan of care as needed throughout shift.

## 2018-02-04 PROCEDURE — A9270 NON-COVERED ITEM OR SERVICE: HCPCS | Performed by: HOSPITALIST

## 2018-02-04 PROCEDURE — A9270 NON-COVERED ITEM OR SERVICE: HCPCS | Performed by: INTERNAL MEDICINE

## 2018-02-04 PROCEDURE — 700102 HCHG RX REV CODE 250 W/ 637 OVERRIDE(OP): Performed by: HOSPITALIST

## 2018-02-04 PROCEDURE — 99231 SBSQ HOSP IP/OBS SF/LOW 25: CPT | Performed by: INTERNAL MEDICINE

## 2018-02-04 PROCEDURE — 700111 HCHG RX REV CODE 636 W/ 250 OVERRIDE (IP): Performed by: INTERNAL MEDICINE

## 2018-02-04 PROCEDURE — 700102 HCHG RX REV CODE 250 W/ 637 OVERRIDE(OP): Performed by: PSYCHIATRY & NEUROLOGY

## 2018-02-04 PROCEDURE — 700102 HCHG RX REV CODE 250 W/ 637 OVERRIDE(OP): Performed by: INTERNAL MEDICINE

## 2018-02-04 PROCEDURE — 770006 HCHG ROOM/CARE - MED/SURG/GYN SEMI*

## 2018-02-04 PROCEDURE — A9270 NON-COVERED ITEM OR SERVICE: HCPCS | Performed by: PSYCHIATRY & NEUROLOGY

## 2018-02-04 RX ADMIN — SIMVASTATIN 10 MG: 20 TABLET, FILM COATED ORAL at 20:21

## 2018-02-04 RX ADMIN — ACETAMINOPHEN 650 MG: 325 TABLET, FILM COATED ORAL at 15:05

## 2018-02-04 RX ADMIN — ARIPIPRAZOLE 2 MG: 2 TABLET ORAL at 09:05

## 2018-02-04 RX ADMIN — ESCITALOPRAM OXALATE 20 MG: 10 TABLET ORAL at 09:05

## 2018-02-04 RX ADMIN — Medication 1000 MCG: at 09:04

## 2018-02-04 RX ADMIN — POTASSIUM CHLORIDE 20 MEQ: 1500 TABLET, EXTENDED RELEASE ORAL at 09:00

## 2018-02-04 RX ADMIN — ENOXAPARIN SODIUM 40 MG: 100 INJECTION SUBCUTANEOUS at 09:04

## 2018-02-04 RX ADMIN — MONTELUKAST SODIUM 10 MG: 10 TABLET, COATED ORAL at 09:05

## 2018-02-04 RX ADMIN — ASPIRIN 81 MG: 81 TABLET, CHEWABLE ORAL at 09:04

## 2018-02-04 RX ADMIN — MEMANTINE HYDROCHLORIDE 10 MG: 10 TABLET ORAL at 20:21

## 2018-02-04 RX ADMIN — MEMANTINE HYDROCHLORIDE 10 MG: 10 TABLET ORAL at 09:05

## 2018-02-04 ASSESSMENT — ENCOUNTER SYMPTOMS
FEVER: 0
WEAKNESS: 1
ABDOMINAL PAIN: 0
DEPRESSION: 0
COUGH: 0

## 2018-02-04 ASSESSMENT — PAIN SCALES - GENERAL
PAINLEVEL_OUTOF10: 0
PAINLEVEL_OUTOF10: 5

## 2018-02-04 NOTE — PROGRESS NOTES
[facility] Hospitalist Progress Note    Date of Service: 2018    Chief Complaint  82 y.o. Male w/h/o Alzheimer's, stroke  admitted 1/15/2018 with wife reporting she can no longer care for him.  Had SI and findings of hypernatremia. MRI brain w findings of old CVA  no acute infarction. Hypernatremia corrected.      Interval Problem Update  Pt seen and examined. He is doing well. His vitals remain stable.     Consultants/Specialty  Geriatrics   Psychiatry    Disposition  Anticipate will need Group home vs Snf. Referral sent 18 to [facility] skilled        Review of Systems   Unable to perform ROS: Dementia   Constitutional: Negative for fever and malaise/fatigue.             HENT: Positive for hearing loss. Negative for congestion.    Respiratory: Negative for cough.    Cardiovascular: Negative for chest pain.   Gastrointestinal: Negative for abdominal pain.   Neurological: Positive for weakness.   Psychiatric/Behavioral: Negative for depression and suicidal ideas.      Physical Exam  Laboratory/Imaging   Hemodynamics  Temp (24hrs), Av.8 °C (98.2 °F), Min:36.6 °C (97.8 °F), Max:37 °C (98.6 °F)   Temperature: 36.6 °C (97.8 °F)  Pulse  Av.8  Min: 59  Max: 108    Blood Pressure : 112/86      Respiratory      Respiration: 16, Pulse Oximetry: 96 %        RUL Breath Sounds: Clear, RML Breath Sounds: Clear, RLL Breath Sounds: Diminished, SANTY Breath Sounds: Clear, LLL Breath Sounds: Diminished    Fluids    Intake/Output Summary (Last 24 hours) at 18 0901  Last data filed at 18 0500   Gross per 24 hour   Intake              400 ml   Output             1350 ml   Net             -950 ml       Nutrition  Orders Placed This Encounter   Procedures   • Diet Order     Standing Status:   Standing     Number of Occurrences:   1     Order Specific Question:   Diet:     Answer:   Regular [1]     Physical Exam   Constitutional: He appears well-developed. No distress.   HENT:   Head: Normocephalic and atraumatic.   Nose:  Nose normal.   Eyes: Conjunctivae and EOM are normal. No scleral icterus.   Neck: Neck supple. No JVD present.   Cardiovascular: Normal rate and regular rhythm.    No murmur heard.  Pulmonary/Chest: Effort normal. No stridor. He has no wheezes. He has no rales.   Abdominal: Soft. Bowel sounds are normal. He exhibits no distension. There is no tenderness. There is no rebound.   Musculoskeletal: He exhibits no edema, tenderness or deformity.   Neurological: He is alert.   Oriented to person and place   Skin: Skin is warm and dry. He is not diaphoretic. No pallor.   Psychiatric: His affect is not blunt and not labile. Cognition and memory are impaired.   Nursing note and vitals reviewed.                               Assessment/Plan     * FTT (failure to thrive) in adult- (present on admission)   Assessment & Plan    Wife unable to care for him at home  SS to assist with placement- consider memory care center, snf or other group home.  Referral sent to [facility] skilled 1/28/18        Hemoptysis   Assessment & Plan    Resolved. 1/24/18--Had small bright red blood clog after coughing this morning, stable vitals, on RA  CBC with stable h/h noted to be 13.3/38.5 respectively on 1/23/18,  no white count, plt 177, renal function is normal.      CXR, CBC, CMP, Pt/INR all normal 1/24/15  monitor        Depression- (present on admission)   Assessment & Plan    abilify and lexapro , monitor          Hypokalemia   Assessment & Plan    Replaced and now stable        Vision loss of left eye- (present on admission)   Assessment & Plan    CT head neg, MRI brain - no acute CVA, evid for prior stroke.  No eye pain or signs of infxn.  Add asa, statin  Monitor         Suicidal ideation- (present on admission)   Assessment & Plan    No active SI. - felt likely assoc dementia and agitation with previous associations with his son who committed suicide.  Psychiatry consulted appreciate rec.         Irritability- (present on admission)    Assessment & Plan      Continue with psych meds- Abilify monitor response.  Psych on board        Asymptomatic bacteriuria- (present on admission)   Assessment & Plan    Monitor for acute symptoms.         Hypernatremia- (present on admission)   Assessment & Plan    Resolved:Hypovolemic - corrected w .45 ns-- now normalized  DC IVF  Encourage intake.          Late onset Alzheimer's disease with behavioral disturbance- (present on admission)   Assessment & Plan    Periods of agitation, encourage frequent re-orientation  DC Donepezil as no improvement in memory noted  Continue escitalopram, memantine  dc Geodon for agiation , monitor at this time  Re directioning   for Dc planning.             Reviewed items::  Labs reviewed, Radiology images reviewed and Medications reviewed  Shaw catheter::  No Shaw  DVT prophylaxis pharmacological::  Heparin        Patient plan of care discussed at multidisplinary team rounds, with patient and R.N at beside.

## 2018-02-04 NOTE — CARE PLAN
Problem: Knowledge Deficit  Goal: Knowledge of the prescribed therapeutic regimen will improve  Outcome: PROGRESSING AS EXPECTED  Spoke to patient about his HS medications.     Problem: Psychosocial Needs:  Goal: Level of anxiety will decrease  Outcome: PROGRESSING AS EXPECTED  Reviewed patient's POC.

## 2018-02-04 NOTE — PROGRESS NOTES
Patient Alert and oriented to person, place, and approximate time. Is pleasant and agreeable, ambulates to bathroom with a cane and is steady on his feet. Denies pain or other complaints. Hourly rounding in place.

## 2018-02-04 NOTE — PROGRESS NOTES
[facility]ist Progress Note    Date of Service: 2018    Chief Complaint  82 y.o. Male w/h/o Alzheimer's, stroke  admitted 1/15/2018 with wife reporting she can no longer care for him.  Had SI and findings of hypernatremia. MRI brain w findings of old CVA  no acute infarction. Hypernatremia corrected.      Interval Problem Update  Pt seen and examined. He has no active complaints His vitals remain stable.     Consultants/Specialty  Geriatrics   Psychiatry    Disposition  Anticipate will need Group home vs Snf. Referral sent 18 to [facility] skilled        Review of Systems   Unable to perform ROS: Dementia   Constitutional: Negative for fever and malaise/fatigue.             HENT: Positive for hearing loss. Negative for congestion.    Respiratory: Negative for cough.    Cardiovascular: Negative for chest pain.   Gastrointestinal: Negative for abdominal pain.   Neurological: Positive for weakness.   Psychiatric/Behavioral: Negative for depression and suicidal ideas.      Physical Exam  Laboratory/Imaging   Hemodynamics  Temp (24hrs), Av.7 °C (98 °F), Min:36.3 °C (97.3 °F), Max:37 °C (98.6 °F)   Temperature: 36.3 °C (97.3 °F)  Pulse  Av.8  Min: 59  Max: 108    Blood Pressure : 116/75      Respiratory      Respiration: 16, Pulse Oximetry: 93 %        RUL Breath Sounds: Clear, RML Breath Sounds: Clear, RLL Breath Sounds: Diminished, SANTY Breath Sounds: Clear, LLL Breath Sounds: Diminished    Fluids    Intake/Output Summary (Last 24 hours) at 18 1217  Last data filed at 18 0500   Gross per 24 hour   Intake              400 ml   Output             1350 ml   Net             -950 ml       Nutrition  Orders Placed This Encounter   Procedures   • Diet Order     Standing Status:   Standing     Number of Occurrences:   1     Order Specific Question:   Diet:     Answer:   Regular [1]     Physical Exam   Constitutional: He appears well-developed. No distress.   HENT:   Head: Normocephalic and atraumatic.    Nose: Nose normal.   Eyes: Conjunctivae and EOM are normal. No scleral icterus.   Neck: Neck supple. No JVD present.   Cardiovascular: Normal rate and regular rhythm.    No murmur heard.  Pulmonary/Chest: Effort normal. No stridor. He has no wheezes. He has no rales.   Abdominal: Soft. Bowel sounds are normal. He exhibits no distension. There is no tenderness. There is no rebound.   Musculoskeletal: He exhibits no edema, tenderness or deformity.   Neurological: He is alert.   Oriented to person and place   Skin: Skin is warm and dry. He is not diaphoretic. No pallor.   Psychiatric: His affect is not blunt and not labile. Cognition and memory are impaired.   Nursing note and vitals reviewed.                               Assessment/Plan     * FTT (failure to thrive) in adult- (present on admission)   Assessment & Plan    Wife unable to care for him at home  SS to assist with placement- consider memory care center, snf or other group home.  Referral sent to [facility] skilled 1/28/18        Hemoptysis   Assessment & Plan    Resolved. 1/24/18--Had small bright red blood clog after coughing this morning, stable vitals, on RA  CBC with stable h/h noted to be 13.3/38.5 respectively on 1/23/18,  no white count, plt 177, renal function is normal.      CXR, CBC, CMP, Pt/INR all normal 1/24/15  monitor        Depression- (present on admission)   Assessment & Plan    abilify and lexapro , monitor          Hypokalemia   Assessment & Plan    Replaced and now stable        Vision loss of left eye- (present on admission)   Assessment & Plan    CT head neg, MRI brain - no acute CVA, evid for prior stroke.  No eye pain or signs of infxn.  Add asa, statin  Monitor         Suicidal ideation- (present on admission)   Assessment & Plan    No active SI. - felt likely assoc dementia and agitation with previous associations with his son who committed suicide.  Psychiatry consulted appreciate rec.         Irritability- (present on admission)    Assessment & Plan      Continue with psych meds- Abilify monitor response.  Psych on board        Asymptomatic bacteriuria- (present on admission)   Assessment & Plan    Monitor for acute symptoms.         Hypernatremia- (present on admission)   Assessment & Plan    Resolved:Hypovolemic - corrected w .45 ns-- now normalized  DC IVF  Encourage intake.          Late onset Alzheimer's disease with behavioral disturbance- (present on admission)   Assessment & Plan    Periods of agitation, encourage frequent re-orientation  DC Donepezil as no improvement in memory noted  Continue escitalopram, memantine  dc Geodon for agiation , monitor at this time  Re directioning   for Dc planning.             Reviewed items::  Labs reviewed, Radiology images reviewed and Medications reviewed  Shaw catheter::  No Shaw  DVT prophylaxis pharmacological::  Heparin        Patient plan of care discussed at multidisplinary team rounds, with patient and R.N at beside.

## 2018-02-04 NOTE — PROGRESS NOTES
Patient received at shift change no concern or complaints at this time to continue to monitor fall precautions in place

## 2018-02-05 PROCEDURE — 770006 HCHG ROOM/CARE - MED/SURG/GYN SEMI*

## 2018-02-05 PROCEDURE — 700102 HCHG RX REV CODE 250 W/ 637 OVERRIDE(OP): Performed by: PSYCHIATRY & NEUROLOGY

## 2018-02-05 PROCEDURE — A9270 NON-COVERED ITEM OR SERVICE: HCPCS | Performed by: INTERNAL MEDICINE

## 2018-02-05 PROCEDURE — A9270 NON-COVERED ITEM OR SERVICE: HCPCS | Performed by: HOSPITALIST

## 2018-02-05 PROCEDURE — 700102 HCHG RX REV CODE 250 W/ 637 OVERRIDE(OP): Performed by: INTERNAL MEDICINE

## 2018-02-05 PROCEDURE — 700111 HCHG RX REV CODE 636 W/ 250 OVERRIDE (IP): Performed by: INTERNAL MEDICINE

## 2018-02-05 PROCEDURE — 99231 SBSQ HOSP IP/OBS SF/LOW 25: CPT | Performed by: INTERNAL MEDICINE

## 2018-02-05 PROCEDURE — 700102 HCHG RX REV CODE 250 W/ 637 OVERRIDE(OP): Performed by: HOSPITALIST

## 2018-02-05 PROCEDURE — A9270 NON-COVERED ITEM OR SERVICE: HCPCS | Performed by: PSYCHIATRY & NEUROLOGY

## 2018-02-05 RX ADMIN — MEMANTINE HYDROCHLORIDE 10 MG: 10 TABLET ORAL at 08:57

## 2018-02-05 RX ADMIN — SIMVASTATIN 10 MG: 20 TABLET, FILM COATED ORAL at 19:48

## 2018-02-05 RX ADMIN — ASPIRIN 81 MG: 81 TABLET, CHEWABLE ORAL at 08:58

## 2018-02-05 RX ADMIN — ESCITALOPRAM OXALATE 20 MG: 10 TABLET ORAL at 09:03

## 2018-02-05 RX ADMIN — ARIPIPRAZOLE 2 MG: 2 TABLET ORAL at 08:57

## 2018-02-05 RX ADMIN — MEMANTINE HYDROCHLORIDE 10 MG: 10 TABLET ORAL at 19:48

## 2018-02-05 RX ADMIN — Medication 1000 MCG: at 08:57

## 2018-02-05 RX ADMIN — MONTELUKAST SODIUM 10 MG: 10 TABLET, COATED ORAL at 08:57

## 2018-02-05 RX ADMIN — POTASSIUM CHLORIDE 20 MEQ: 1500 TABLET, EXTENDED RELEASE ORAL at 09:00

## 2018-02-05 RX ADMIN — ENOXAPARIN SODIUM 40 MG: 100 INJECTION SUBCUTANEOUS at 08:58

## 2018-02-05 ASSESSMENT — ENCOUNTER SYMPTOMS
WEAKNESS: 1
FEVER: 0
DEPRESSION: 0
COUGH: 0
ABDOMINAL PAIN: 0

## 2018-02-05 NOTE — PROGRESS NOTES
Assumed care of pt at 0730. A/Ox3, discussed plan of care. Pt on room air, tolerating diet, up with stand by assist, pt uses cane to ambulate, pt requesting coffee this AM. All needs met at this time. Bed in lowest position, treaded socks on, personal belongings and call light within reach, instructed to call for any assistance.

## 2018-02-05 NOTE — CARE PLAN
Problem: Communication  Goal: The ability to communicate needs accurately and effectively will improve  Outcome: PROGRESSING AS EXPECTED  Communication board updated. Call light is in reach.     Problem: Bowel/Gastric:  Goal: Normal bowel function is maintained or improved  Outcome: PROGRESSING AS EXPECTED  Last BM 2/4/18 per pt report. Pt has no complaints about bowel regimen.

## 2018-02-05 NOTE — DISCHARGE PLANNING
MSW called and left a message for Matt owner of [facility] to inquire why they have not been able to accept pt after accepting them for admission last Wednesday.

## 2018-02-05 NOTE — CARE PLAN
Problem: Communication  Goal: The ability to communicate needs accurately and effectively will improve  Outcome: PROGRESSING AS EXPECTED  Pt stating he would like to speak with the , pt stating understanding.     Problem: Safety  Goal: Will remain free from injury  Outcome: PROGRESSING AS EXPECTED  PT educated to call prior to getting up, pt stating understanding.

## 2018-02-05 NOTE — CARE PLAN
Problem: Safety  Goal: Will remain free from injury    Intervention: Educate patient and significant other/support system about adaptive mobility strategies and safe transfers  Continue to encourage patient to use call light       Problem: Bowel/Gastric:  Goal: Will not experience complications related to bowel motility    Intervention: Assess baseline bowel pattern  Monitor output

## 2018-02-05 NOTE — PROGRESS NOTES
[facility]    Date of Service: 2018    Chief Complaint  82 y.o. Male w/h/o Alzheimer's, stroke  admitted 1/15/2018 with wife reporting she can no longer care for him.  Had SI and findings of hypernatremia. MRI brain w findings of old CVA  no acute infarction. Hypernatremia corrected.      Interval Problem Update  Pt seen and examined. No active complaints.     Consultants/Specialty  Geriatrics   Psychiatry    Disposition  Anticipate will need Group home vs Snf. Referral sent 18 to [facility] skilled        Review of Systems   Unable to perform ROS: Dementia   Constitutional: Negative for fever and malaise/fatigue.             HENT: Positive for hearing loss. Negative for congestion.    Respiratory: Negative for cough.    Cardiovascular: Negative for chest pain.   Gastrointestinal: Negative for abdominal pain.   Neurological: Positive for weakness.   Psychiatric/Behavioral: Negative for depression and suicidal ideas.      Physical Exam  Laboratory/Imaging   Hemodynamics  Temp (24hrs), Av.6 °C (97.9 °F), Min:36.5 °C (97.7 °F), Max:36.8 °C (98.2 °F)   Temperature: 36.5 °C (97.7 °F)  Pulse  Av.2  Min: 59  Max: 108    Blood Pressure : 107/68      Respiratory      Respiration: 14, Pulse Oximetry: 96 %        RUL Breath Sounds: Clear, RML Breath Sounds: Clear, RLL Breath Sounds: Clear, SANTY Breath Sounds: Clear, LLL Breath Sounds: Clear    Fluids    Intake/Output Summary (Last 24 hours) at 18 1353  Last data filed at 18 0400   Gross per 24 hour   Intake             1700 ml   Output              600 ml   Net             1100 ml       Nutrition  Orders Placed This Encounter   Procedures   • Diet Order     Standing Status:   Standing     Number of Occurrences:   1     Order Specific Question:   Diet:     Answer:   Regular [1]     Physical Exam   Constitutional: He appears well-developed. No distress.   HENT:   Head: Normocephalic and atraumatic.   Nose: Nose normal.   Eyes:  Conjunctivae and EOM are normal. No scleral icterus.   Neck: Neck supple. No JVD present.   Cardiovascular: Normal rate and regular rhythm.    No murmur heard.  Pulmonary/Chest: Effort normal. No stridor. He has no wheezes. He has no rales.   Abdominal: Soft. Bowel sounds are normal. He exhibits no distension. There is no tenderness. There is no rebound.   Musculoskeletal: He exhibits no edema, tenderness or deformity.   Neurological: He is alert.   Oriented to person and place   Skin: Skin is warm and dry. He is not diaphoretic. No pallor.   Psychiatric: His affect is not blunt and not labile. Cognition and memory are impaired.   Nursing note and vitals reviewed.                               Assessment/Plan     * FTT (failure to thrive) in adult- (present on admission)   Assessment & Plan    Wife unable to care for him at home  SS to assist with placement- consider memory care center, snf or other group home.  Referral sent to [facility] skilled 1/28/18        Hemoptysis   Assessment & Plan    Resolved. 1/24/18--Had small bright red blood clog after coughing this morning, stable vitals, on RA  CBC with stable h/h noted to be 13.3/38.5 respectively on 1/23/18,  no white count, plt 177, renal function is normal.      CXR, CBC, CMP, Pt/INR all normal 1/24/15  monitor        Depression- (present on admission)   Assessment & Plan    abilify and lexapro , monitor          Hypokalemia   Assessment & Plan    Replaced and now stable        Vision loss of left eye- (present on admission)   Assessment & Plan    CT head neg, MRI brain - no acute CVA, evid for prior stroke.  No eye pain or signs of infxn.  Add asa, statin  Monitor         Suicidal ideation- (present on admission)   Assessment & Plan    No active SI. - felt likely assoc dementia and agitation with previous associations with his son who committed suicide.  Psychiatry consulted appreciate rec.         Irritability- (present on admission)   Assessment & Plan       Continue with psych meds- Abilify monitor response.  Psych on board        Asymptomatic bacteriuria- (present on admission)   Assessment & Plan    Monitor for acute symptoms.         Hypernatremia- (present on admission)   Assessment & Plan    Resolved:Hypovolemic - corrected w .45 ns-- now normalized  DC IVF  Encourage intake.          Late onset Alzheimer's disease with behavioral disturbance- (present on admission)   Assessment & Plan    Periods of agitation, encourage frequent re-orientation  DC Donepezil as no improvement in memory noted  Continue escitalopram, memantine  dc Geodon for agiation , monitor at this time  Re directioning   for Dc planning.             Reviewed items::  Labs reviewed, Radiology images reviewed and Medications reviewed  Shaw catheter::  No Shaw  DVT prophylaxis pharmacological::  Heparin        Patient plan of care discussed at multidisplinary team rounds, with patient and R.N at beside.

## 2018-02-05 NOTE — PROGRESS NOTES
Assumed care of Pt from Tamir Lopez. Pt is in pleasant mood watching Super Bowl. Provided pt with a cup of decaf coffee. Pt has no pain/discomfort at this time.

## 2018-02-06 LAB
ANION GAP SERPL CALC-SCNC: 8 MMOL/L (ref 0–11.9)
BUN SERPL-MCNC: 12 MG/DL (ref 8–22)
CALCIUM SERPL-MCNC: 8.9 MG/DL (ref 8.5–10.5)
CHLORIDE SERPL-SCNC: 106 MMOL/L (ref 96–112)
CO2 SERPL-SCNC: 22 MMOL/L (ref 20–33)
CREAT SERPL-MCNC: 0.92 MG/DL (ref 0.5–1.4)
GLUCOSE SERPL-MCNC: 88 MG/DL (ref 65–99)
POTASSIUM SERPL-SCNC: 3.9 MMOL/L (ref 3.6–5.5)
SODIUM SERPL-SCNC: 136 MMOL/L (ref 135–145)

## 2018-02-06 PROCEDURE — 700102 HCHG RX REV CODE 250 W/ 637 OVERRIDE(OP): Performed by: PSYCHIATRY & NEUROLOGY

## 2018-02-06 PROCEDURE — A9270 NON-COVERED ITEM OR SERVICE: HCPCS | Performed by: HOSPITALIST

## 2018-02-06 PROCEDURE — 700102 HCHG RX REV CODE 250 W/ 637 OVERRIDE(OP): Performed by: HOSPITALIST

## 2018-02-06 PROCEDURE — 700111 HCHG RX REV CODE 636 W/ 250 OVERRIDE (IP): Performed by: INTERNAL MEDICINE

## 2018-02-06 PROCEDURE — A9270 NON-COVERED ITEM OR SERVICE: HCPCS | Performed by: PSYCHIATRY & NEUROLOGY

## 2018-02-06 PROCEDURE — 80048 BASIC METABOLIC PNL TOTAL CA: CPT

## 2018-02-06 PROCEDURE — 700102 HCHG RX REV CODE 250 W/ 637 OVERRIDE(OP): Performed by: INTERNAL MEDICINE

## 2018-02-06 PROCEDURE — 99231 SBSQ HOSP IP/OBS SF/LOW 25: CPT | Performed by: INTERNAL MEDICINE

## 2018-02-06 PROCEDURE — A9270 NON-COVERED ITEM OR SERVICE: HCPCS | Performed by: INTERNAL MEDICINE

## 2018-02-06 PROCEDURE — 770006 HCHG ROOM/CARE - MED/SURG/GYN SEMI*

## 2018-02-06 PROCEDURE — 36415 COLL VENOUS BLD VENIPUNCTURE: CPT

## 2018-02-06 RX ADMIN — Medication 1000 MCG: at 08:48

## 2018-02-06 RX ADMIN — MONTELUKAST SODIUM 10 MG: 10 TABLET, COATED ORAL at 08:48

## 2018-02-06 RX ADMIN — MEMANTINE HYDROCHLORIDE 10 MG: 10 TABLET ORAL at 20:21

## 2018-02-06 RX ADMIN — ASPIRIN 81 MG: 81 TABLET, CHEWABLE ORAL at 08:47

## 2018-02-06 RX ADMIN — MEMANTINE HYDROCHLORIDE 10 MG: 10 TABLET ORAL at 08:48

## 2018-02-06 RX ADMIN — ENOXAPARIN SODIUM 40 MG: 100 INJECTION SUBCUTANEOUS at 08:48

## 2018-02-06 RX ADMIN — ESCITALOPRAM OXALATE 20 MG: 10 TABLET ORAL at 08:48

## 2018-02-06 RX ADMIN — SIMVASTATIN 10 MG: 20 TABLET, FILM COATED ORAL at 20:21

## 2018-02-06 RX ADMIN — POTASSIUM CHLORIDE 20 MEQ: 1500 TABLET, EXTENDED RELEASE ORAL at 08:48

## 2018-02-06 RX ADMIN — ARIPIPRAZOLE 2 MG: 2 TABLET ORAL at 09:02

## 2018-02-06 NOTE — DISCHARGE PLANNING
Geno from [facility] assessed pt and has accepted him for admission only if he has a rep payee. MSW called pt's spouse and left a message to see if she is agreeable to a rep payee for pt.

## 2018-02-06 NOTE — CARE PLAN
Problem: Safety  Goal: Will remain free from injury  Outcome: PROGRESSING AS EXPECTED  Pt educated to call when ambulating, pt stating understanding.     Problem: Knowledge Deficit  Goal: Knowledge of disease process/condition, treatment plan, diagnostic tests, and medications will improve  Outcome: PROGRESSING SLOWER THAN EXPECTED  Pt stating he has no money, pt stating his wife took it all.  informed pt would like to speak with them.

## 2018-02-06 NOTE — PROGRESS NOTES
Assumed care of pt at 0730. A/Ox3, discussed plan of care. Pt on room air, tolerating diet, up with standby assist and cane. Pt stating he doesn't know day but recited year. All needs met at this time. Bed in lowest position, treaded socks on, personal belongings and call light within reach, instructed to call for any assistance.

## 2018-02-06 NOTE — PROGRESS NOTES
Alert x3, bed alarm on for safety and assistance with his cane out of bed. Cooperative and compliant with care and frequent request for coffee.cont plan of care, call light within reach and visual checks through the night

## 2018-02-06 NOTE — CARE PLAN
Problem: Safety  Goal: Will remain free from injury  Bed alarm on for safety and assistance with his cane    Problem: Discharge Barriers/Planning  Goal: Patient's continuum of care needs will be met  Awaiting placement

## 2018-02-07 PROCEDURE — 700111 HCHG RX REV CODE 636 W/ 250 OVERRIDE (IP): Performed by: INTERNAL MEDICINE

## 2018-02-07 PROCEDURE — A9270 NON-COVERED ITEM OR SERVICE: HCPCS | Performed by: INTERNAL MEDICINE

## 2018-02-07 PROCEDURE — 99231 SBSQ HOSP IP/OBS SF/LOW 25: CPT | Performed by: INTERNAL MEDICINE

## 2018-02-07 PROCEDURE — A9270 NON-COVERED ITEM OR SERVICE: HCPCS | Performed by: HOSPITALIST

## 2018-02-07 PROCEDURE — 700102 HCHG RX REV CODE 250 W/ 637 OVERRIDE(OP): Performed by: INTERNAL MEDICINE

## 2018-02-07 PROCEDURE — 700102 HCHG RX REV CODE 250 W/ 637 OVERRIDE(OP): Performed by: PSYCHIATRY & NEUROLOGY

## 2018-02-07 PROCEDURE — A9270 NON-COVERED ITEM OR SERVICE: HCPCS | Performed by: PSYCHIATRY & NEUROLOGY

## 2018-02-07 PROCEDURE — 700102 HCHG RX REV CODE 250 W/ 637 OVERRIDE(OP): Performed by: HOSPITALIST

## 2018-02-07 PROCEDURE — 770006 HCHG ROOM/CARE - MED/SURG/GYN SEMI*

## 2018-02-07 RX ADMIN — SIMVASTATIN 10 MG: 20 TABLET, FILM COATED ORAL at 20:02

## 2018-02-07 RX ADMIN — ARIPIPRAZOLE 2 MG: 2 TABLET ORAL at 08:32

## 2018-02-07 RX ADMIN — ASPIRIN 81 MG: 81 TABLET, CHEWABLE ORAL at 08:30

## 2018-02-07 RX ADMIN — ENOXAPARIN SODIUM 40 MG: 100 INJECTION SUBCUTANEOUS at 08:30

## 2018-02-07 RX ADMIN — MEMANTINE HYDROCHLORIDE 10 MG: 10 TABLET ORAL at 20:02

## 2018-02-07 RX ADMIN — MONTELUKAST SODIUM 10 MG: 10 TABLET, COATED ORAL at 08:30

## 2018-02-07 RX ADMIN — MEMANTINE HYDROCHLORIDE 10 MG: 10 TABLET ORAL at 08:30

## 2018-02-07 RX ADMIN — POTASSIUM CHLORIDE 20 MEQ: 1500 TABLET, EXTENDED RELEASE ORAL at 08:30

## 2018-02-07 RX ADMIN — ESCITALOPRAM OXALATE 20 MG: 10 TABLET ORAL at 08:30

## 2018-02-07 RX ADMIN — Medication 1000 MCG: at 08:30

## 2018-02-07 ASSESSMENT — ENCOUNTER SYMPTOMS
FEVER: 0
WEAKNESS: 1
COUGH: 0
DEPRESSION: 0
ABDOMINAL PAIN: 0

## 2018-02-07 ASSESSMENT — PAIN SCALES - GENERAL
PAINLEVEL_OUTOF10: 0
PAINLEVEL_OUTOF10: 0

## 2018-02-07 NOTE — PROGRESS NOTES
[facility] Progress Note    Date of Service: 2018    Chief Complaint  82 y.o. Male w/h/o Alzheimer's, stroke  admitted 1/15/2018 with wife reporting she can no longer care for him.  Had SI and findings of hypernatremia. MRI brain w findings of old CVA  no acute infarction. Hypernatremia corrected.      Interval Problem Update  Pt seen and examined. He has no active complaints. His vitals remained stable.    Consultants/Specialty  Geriatrics   Psychiatry    Disposition  Anticipate will need Group home vs Snf. Referral sent 18 to [facility] skilled        Review of Systems   Unable to perform ROS: Dementia   Constitutional: Negative for fever and malaise/fatigue.             HENT: Positive for hearing loss. Negative for congestion.    Respiratory: Negative for cough.    Cardiovascular: Negative for chest pain.   Gastrointestinal: Negative for abdominal pain.   Neurological: Positive for weakness.   Psychiatric/Behavioral: Negative for depression and suicidal ideas.      Physical Exam  Laboratory/Imaging   Hemodynamics  Temp (24hrs), Av.8 °C (98.3 °F), Min:36.6 °C (97.8 °F), Max:37.1 °C (98.7 °F)   Temperature: 36.9 °C (98.4 °F)  Pulse  Av.4  Min: 59  Max: 108    Blood Pressure : (!) 96/75 (rn aware)      Respiratory      Respiration: 18, Pulse Oximetry: 95 %        RUL Breath Sounds: Clear, RML Breath Sounds: Clear, RLL Breath Sounds: Clear, SANTY Breath Sounds: Clear, LLL Breath Sounds: Clear    Fluids    Intake/Output Summary (Last 24 hours) at 18 1116  Last data filed at 18 0405   Gross per 24 hour   Intake              920 ml   Output              660 ml   Net              260 ml       Nutrition  Orders Placed This Encounter   Procedures   • Diet Order     Standing Status:   Standing     Number of Occurrences:   1     Order Specific Question:   Diet:     Answer:   Regular [1]     Physical Exam   Constitutional: He appears well-developed. No distress.   HENT:   Head: Normocephalic and  atraumatic.   Nose: Nose normal.   Eyes: Conjunctivae and EOM are normal. No scleral icterus.   Neck: Neck supple. No JVD present.   Cardiovascular: Normal rate and regular rhythm.    No murmur heard.  Pulmonary/Chest: Effort normal. No stridor. He has no wheezes. He has no rales.   Abdominal: Soft. Bowel sounds are normal. He exhibits no distension. There is no tenderness. There is no rebound.   Musculoskeletal: He exhibits no edema, tenderness or deformity.   Neurological: He is alert.   Oriented to person and place   Skin: Skin is warm and dry. He is not diaphoretic. No pallor.   Psychiatric: His affect is not blunt and not labile. Cognition and memory are impaired.   Nursing note and vitals reviewed.          Recent Labs      02/06/18   0217   SODIUM  136   POTASSIUM  3.9   CHLORIDE  106   CO2  22   GLUCOSE  88   BUN  12   CREATININE  0.92   CALCIUM  8.9                      Assessment/Plan     * FTT (failure to thrive) in adult- (present on admission)   Assessment & Plan    Wife unable to care for him at home  SS to assist with placement- consider memory care center, snf or other group home.  Referral sent to [facility] skilled 1/28/18        Hemoptysis   Assessment & Plan    Resolved. 1/24/18--Had small bright red blood clog after coughing this morning, stable vitals, on RA  CBC with stable h/h noted to be 13.3/38.5 respectively on 1/23/18,  no white count, plt 177, renal function is normal.      CXR, CBC, CMP, Pt/INR all normal 1/24/15  monitor        Depression- (present on admission)   Assessment & Plan    abilify and lexapro , monitor          Hypokalemia   Assessment & Plan    Replaced and now stable        Vision loss of left eye- (present on admission)   Assessment & Plan    CT head neg, MRI brain - no acute CVA, evid for prior stroke.  No eye pain or signs of infxn.  Add asa, statin  Monitor         Suicidal ideation- (present on admission)   Assessment & Plan    No active SI. - felt likely assoc dementia and  agitation with previous associations with his son who committed suicide.  Psychiatry consulted appreciate rec.         Irritability- (present on admission)   Assessment & Plan      Continue with psych meds- Abilify monitor response.  Psych on board        Asymptomatic bacteriuria- (present on admission)   Assessment & Plan    Monitor for acute symptoms.         Hypernatremia- (present on admission)   Assessment & Plan    Resolved:Hypovolemic - corrected w .45 ns-- now normalized  DC IVF  Encourage intake.          Late onset Alzheimer's disease with behavioral disturbance- (present on admission)   Assessment & Plan    Periods of agitation, encourage frequent re-orientation  DC Donepezil as no improvement in memory noted  Continue escitalopram, memantine  dc Geodon for agiation , monitor at this time  Re directioning  SS for Dc planning.             Reviewed items::  Labs reviewed, Radiology images reviewed and Medications reviewed  Shaw catheter::  No Shaw  DVT prophylaxis pharmacological::  Heparin        Patient plan of care discussed at multidisplinary team rounds, with patient and R.N at beside.

## 2018-02-07 NOTE — PROGRESS NOTES
Alert x3, bed alarm on for safety and assistance with his cane. Cooperative as long as he gets his coffee requests. Cont plan of care, call light within reach and visual checks through the night

## 2018-02-07 NOTE — DISCHARGE PLANNING
Medical Social Work    SHANITA called and spoke w/ pt's spouse, Brenda, to discuss rep payee services. SHANITA explained to Brenda that [facility] has assessed the pt and the pt has been accepted for admission. SHANITA further explained that [facility] requests that the pt has a rep-payee before going. SHANITA explained what a rep-payee is and what they do and Brenda in agreement with pt having a rep-payee. Brenda also asking SHANITA to call  Makayla @ [phone] to provide update on placement.

## 2018-02-07 NOTE — CARE PLAN
Problem: Venous Thromboembolism (VTW)/Deep Vein Thrombosis (DVT) Prevention:  Goal: Patient will participate in Venous Thrombosis (VTE)/Deep Vein Thrombosis (DVT)Prevention Measures  Outcome: PROGRESSING AS EXPECTED  Pt educated on importance of DVT prophylaxis, pt complying with Lovenox shots.    Problem: Discharge Barriers/Planning  Goal: Patient's continuum of care needs will be met  Outcome: PROGRESSING AS EXPECTED  SW working on d/c planning. Pt updated on plan for d/c.

## 2018-02-07 NOTE — CARE PLAN
Problem: Safety  Goal: Will remain free from injury  Bed alarm on for safety and assistance with a cane    Problem: Discharge Barriers/Planning  Goal: Patient's continuum of care needs will be met  Awaiting placement for discharge

## 2018-02-07 NOTE — DISCHARGE PLANNING
"Medical Social Work    SHANITA called Makayla ([phone]) w/ Aging [facility] Disability to discuss dc plan. SHANITA explained that pt has been assessed and accepted for admission to [facility]. Makayla verbalized that she didn't feel this was an appropriate placement given the pt has Dementia. Makayla states that it is her understanding that [facility] is very minimal care and for more independent living. SHANITA spoke w/ bedside RN, charge RN, and MD regarding pt's Dementia and ability to care for self. Pt is not a risk for wandering, pt is ambulatory, and it is believed that the pt will do well in their \"assisted living\" environment versus independent living. SHANITA called Makayla back and left voicemail providing her an update on appropriateness of dc placement at [facility].    SHANITA started rep-payee process. SHANITA completed online referral to [place], MD completed FORM [id number], and SHANITA faxed completed form to [employer] @ [employer] ([phone]).     SHANITA provided bedside RN w/ application for [facility]. When completed and signed by MD, SHANITA can fax to [facility]. Pt already has TB test completed and SW to fax to [facility] w/ competed application.    Plan: Pt to transfer to [facility] when application complete and rep-payee in place.  "

## 2018-02-07 NOTE — PROGRESS NOTES
[facility] Hospitalist Progress Note    Date of Service: 2018    Chief Complaint  82 y.o. Male w/h/o Alzheimer's, stroke  admitted 1/15/2018 with wife reporting she can no longer care for him.  Had SI and findings of hypernatremia. MRI brain w findings of old CVA  no acute infarction. Hypernatremia corrected.      Interval Problem Update  Pt seen and examined. Remains forgetful. Asking when he can leave the hospital. Explained to him that we are waiting for group home versus SNF acceptance     Consultants/Specialty  Geriatrics   Psychiatry    Disposition  Anticipate will need Group home vs Snf. Referral sent 18 to [facility] skilled        Review of Systems   Unable to perform ROS: Dementia   Constitutional: Negative for fever and malaise/fatigue.             HENT: Positive for hearing loss. Negative for congestion.    Respiratory: Negative for cough.    Cardiovascular: Negative for chest pain.   Gastrointestinal: Negative for abdominal pain.   Neurological: Positive for weakness.   Psychiatric/Behavioral: Negative for depression and suicidal ideas.      Physical Exam  Laboratory/Imaging   Hemodynamics  Temp (24hrs), Av.8 °C (98.3 °F), Min:36.6 °C (97.8 °F), Max:37.1 °C (98.7 °F)   Temperature: 36.9 °C (98.4 °F)  Pulse  Av.4  Min: 59  Max: 108    Blood Pressure : (!) 96/75 (rn aware)      Respiratory      Respiration: 18, Pulse Oximetry: 95 %        RUL Breath Sounds: Clear, RML Breath Sounds: Clear, RLL Breath Sounds: Clear, SANTY Breath Sounds: Clear, LLL Breath Sounds: Clear    Fluids    Intake/Output Summary (Last 24 hours) at 18 1116  Last data filed at 18 0405   Gross per 24 hour   Intake              920 ml   Output              660 ml   Net              260 ml       Nutrition  Orders Placed This Encounter   Procedures   • Diet Order     Standing Status:   Standing     Number of Occurrences:   1     Order Specific Question:   Diet:     Answer:   Regular [1]     Physical Exam   Constitutional:  He appears well-developed. No distress.   HENT:   Head: Normocephalic and atraumatic.   Nose: Nose normal.   Eyes: Conjunctivae and EOM are normal. No scleral icterus.   Neck: Neck supple. No JVD present.   Cardiovascular: Normal rate and regular rhythm.    No murmur heard.  Pulmonary/Chest: Effort normal. No stridor. He has no wheezes. He has no rales.   Abdominal: Soft. Bowel sounds are normal. He exhibits no distension. There is no tenderness. There is no rebound.   Musculoskeletal: He exhibits no edema, tenderness or deformity.   Neurological: He is alert.   Oriented to person and place   Skin: Skin is warm and dry. He is not diaphoretic. No pallor.   Psychiatric: His affect is not blunt and not labile. Cognition and memory are impaired.   Nursing note and vitals reviewed.          Recent Labs      02/06/18   0217   SODIUM  136   POTASSIUM  3.9   CHLORIDE  106   CO2  22   GLUCOSE  88   BUN  12   CREATININE  0.92   CALCIUM  8.9                      Assessment/Plan     * FTT (failure to thrive) in adult- (present on admission)   Assessment & Plan    Wife unable to care for him at home  SS to assist with placement- consider memory care center, snf or other group home.  Referral sent to [facility] 1/28/18        Hemoptysis   Assessment & Plan    Resolved. 1/24/18--Had small bright red blood clog after coughing this morning, stable vitals, on RA  CBC with stable h/h noted to be 13.3/38.5 respectively on 1/23/18,  no white count, plt 177, renal function is normal.      CXR, CBC, CMP, Pt/INR all normal 1/24/15  monitor        Depression- (present on admission)   Assessment & Plan    abilify and lexapro , monitor          Hypokalemia   Assessment & Plan    Replaced and now stable        Vision loss of left eye- (present on admission)   Assessment & Plan    CT head neg, MRI brain - no acute CVA, evid for prior stroke.  No eye pain or signs of infxn.  Add asa, statin  Monitor         Suicidal ideation- (present on  admission)   Assessment & Plan    No active SI. - felt likely assoc dementia and agitation with previous associations with his son who committed suicide.  Psychiatry consulted appreciate rec.         Irritability- (present on admission)   Assessment & Plan      Continue with psych meds- Abilify monitor response.  Psych on board        Asymptomatic bacteriuria- (present on admission)   Assessment & Plan    Monitor for acute symptoms.         Hypernatremia- (present on admission)   Assessment & Plan    Resolved:Hypovolemic - corrected w .45 ns-- now normalized  DC IVF  Encourage intake.          Late onset Alzheimer's disease with behavioral disturbance- (present on admission)   Assessment & Plan    Periods of agitation, encourage frequent re-orientation  DC Donepezil as no improvement in memory noted  Continue escitalopram, memantine  dc Geodon for agiation , monitor at this time  Re directioning  SS for Dc planning.             Reviewed items::  Labs reviewed, Radiology images reviewed and Medications reviewed  Shaw catheter::  No Shaw  DVT prophylaxis pharmacological::  Heparin        Patient plan of care discussed at multidisplinary team rounds, with patient and R.N at beside.

## 2018-02-07 NOTE — PROGRESS NOTES
Report received, pt assessed. Pt calling for Coffee frequently thought shift. Pt updated on POC. Pt up with SB to bathroom. Sitting up in bed for breakfast. Bed alarm on.

## 2018-02-08 PROCEDURE — 700102 HCHG RX REV CODE 250 W/ 637 OVERRIDE(OP): Performed by: HOSPITALIST

## 2018-02-08 PROCEDURE — A9270 NON-COVERED ITEM OR SERVICE: HCPCS | Performed by: HOSPITALIST

## 2018-02-08 PROCEDURE — 700111 HCHG RX REV CODE 636 W/ 250 OVERRIDE (IP): Performed by: INTERNAL MEDICINE

## 2018-02-08 PROCEDURE — 700102 HCHG RX REV CODE 250 W/ 637 OVERRIDE(OP): Performed by: INTERNAL MEDICINE

## 2018-02-08 PROCEDURE — 770006 HCHG ROOM/CARE - MED/SURG/GYN SEMI*

## 2018-02-08 PROCEDURE — A9270 NON-COVERED ITEM OR SERVICE: HCPCS | Performed by: PSYCHIATRY & NEUROLOGY

## 2018-02-08 PROCEDURE — 99231 SBSQ HOSP IP/OBS SF/LOW 25: CPT | Performed by: INTERNAL MEDICINE

## 2018-02-08 PROCEDURE — A9270 NON-COVERED ITEM OR SERVICE: HCPCS | Performed by: INTERNAL MEDICINE

## 2018-02-08 PROCEDURE — 700102 HCHG RX REV CODE 250 W/ 637 OVERRIDE(OP): Performed by: PSYCHIATRY & NEUROLOGY

## 2018-02-08 RX ADMIN — ERGOCALCIFEROL 50000 UNITS: 1.25 CAPSULE ORAL at 13:12

## 2018-02-08 RX ADMIN — MEMANTINE HYDROCHLORIDE 10 MG: 10 TABLET ORAL at 20:23

## 2018-02-08 RX ADMIN — MEMANTINE HYDROCHLORIDE 10 MG: 10 TABLET ORAL at 09:23

## 2018-02-08 RX ADMIN — ASPIRIN 81 MG: 81 TABLET, CHEWABLE ORAL at 09:23

## 2018-02-08 RX ADMIN — MONTELUKAST SODIUM 10 MG: 10 TABLET, COATED ORAL at 09:23

## 2018-02-08 RX ADMIN — SIMVASTATIN 10 MG: 20 TABLET, FILM COATED ORAL at 20:23

## 2018-02-08 RX ADMIN — ARIPIPRAZOLE 2 MG: 2 TABLET ORAL at 09:23

## 2018-02-08 RX ADMIN — Medication 1000 MCG: at 09:23

## 2018-02-08 RX ADMIN — ESCITALOPRAM OXALATE 20 MG: 10 TABLET ORAL at 09:23

## 2018-02-08 RX ADMIN — ENOXAPARIN SODIUM 40 MG: 100 INJECTION SUBCUTANEOUS at 09:23

## 2018-02-08 RX ADMIN — POTASSIUM CHLORIDE 20 MEQ: 1500 TABLET, EXTENDED RELEASE ORAL at 09:23

## 2018-02-08 ASSESSMENT — ENCOUNTER SYMPTOMS
FEVER: 0
WEAKNESS: 1
ABDOMINAL PAIN: 0
COUGH: 0
DEPRESSION: 0

## 2018-02-08 ASSESSMENT — PAIN SCALES - GENERAL
PAINLEVEL_OUTOF10: 0
PAINLEVEL_OUTOF10: 0

## 2018-02-08 NOTE — PROGRESS NOTES
Report received, pt assessed. Fall precautions in place, pt calls appropriately to get up to bathroom. POC discussed. Pt frequently requesting coffee. Pt asking if we have heard from wife, SW spoke with wife yesterday.

## 2018-02-08 NOTE — PROGRESS NOTES
Alert x3, cooperative and compliant with care, takes his medications without a problem. Frequently asking for coffee. Cont plan of care, call light within reach and visual checks through the night

## 2018-02-08 NOTE — CARE PLAN
Problem: Safety  Goal: Will remain free from injury  Bed alarm on for safety and assistance with his cane out of bed    Problem: Discharge Barriers/Planning  Goal: Patient's continuum of care needs will be met  Awaiting placement discharge

## 2018-02-08 NOTE — DISCHARGE PLANNING
Medical Social Work    SHANITA received PC from Geno @ [facility] confirming that they are accepting the pt for admission. Geno requested SHANITA fax pt's Facesheet, H&P, TB test results, and completed Admission Application. SHANITA faxed documents to [facility] ATT: Geno to [phone] at 1445. SHANITA also left VM for Geno explaining the fax has been sent and requesting confirmation that fax was received.    SHANITA spoke w/ SHANITA Supervisor, Tena, regarding approval for approved services requesting that [facility] pay for pt's first three (3) months of rent at [facility] @ $1600 month. Pt is pending approval of group home waiver and lacks the funds to pay for long term placement with current income. SHANITA Madsen approved payment of first three (3) months. Approved Services form completed and signed.      Plan: Pt to dc to [facility] into a shared room on Monday 2/12 when room can be ready for move-in. SHANITA will remain available for dc planning needs.

## 2018-02-08 NOTE — CARE PLAN
Problem: Bowel/Gastric:  Goal: Normal bowel function is maintained or improved  Outcome: PROGRESSING AS EXPECTED  Pt having regular BMs. Pt denies need for stool softener.

## 2018-02-08 NOTE — PROGRESS NOTES
[facility]    Date of Service: 2018    Chief Complaint  82 y.o. Male w/h/o Alzheimer's, stroke  admitted 1/15/2018 with wife reporting she can no longer care for him.  Had SI and findings of hypernatremia. MRI brain w findings of old CVA  no acute infarction. Hypernatremia corrected.      Interval Problem Update  Pt seen and examined. No acute events. His vitals remained stable.    Consultants/Specialty  Geriatrics   Psychiatry    Disposition  Plan for discharge to [facility], awaiting placement.        Review of Systems   Unable to perform ROS: Dementia   Constitutional: Negative for fever and malaise/fatigue.             HENT: Positive for hearing loss. Negative for congestion.    Respiratory: Negative for cough.    Cardiovascular: Negative for chest pain.   Gastrointestinal: Negative for abdominal pain.   Neurological: Positive for weakness.   Psychiatric/Behavioral: Negative for depression and suicidal ideas.      Physical Exam  Laboratory/Imaging   Hemodynamics  Temp (24hrs), Av.4 °C (97.6 °F), Min:36.2 °C (97.1 °F), Max:36.7 °C (98 °F)   Temperature: 36.2 °C (97.1 °F)  Pulse  Av.9  Min: 59  Max: 108    Blood Pressure : 109/67      Respiratory      Respiration: 18, Pulse Oximetry: 98 %        RUL Breath Sounds: Clear, RML Breath Sounds: Clear, RLL Breath Sounds: Diminished, SANTY Breath Sounds: Clear, LLL Breath Sounds: Diminished    Fluids    Intake/Output Summary (Last 24 hours) at 18 1527  Last data filed at 18 1341   Gross per 24 hour   Intake             1500 ml   Output              900 ml   Net              600 ml       Nutrition  Orders Placed This Encounter   Procedures   • Diet Order     Standing Status:   Standing     Number of Occurrences:   1     Order Specific Question:   Diet:     Answer:   Regular [1]     Physical Exam   Constitutional: He appears well-developed. No distress.   HENT:   Head: Normocephalic and atraumatic.   Nose: Nose normal.    Eyes: Conjunctivae and EOM are normal. No scleral icterus.   Neck: Neck supple. No JVD present.   Cardiovascular: Normal rate and regular rhythm.    No murmur heard.  Pulmonary/Chest: Effort normal. No stridor. He has no wheezes. He has no rales.   Abdominal: Soft. Bowel sounds are normal. He exhibits no distension. There is no tenderness. There is no rebound.   Musculoskeletal: He exhibits no edema, tenderness or deformity.   Neurological: He is alert.   Oriented to person and place   Skin: Skin is warm and dry. He is not diaphoretic. No pallor.   Psychiatric: His affect is not blunt and not labile. Cognition and memory are impaired.   Nursing note and vitals reviewed.          Recent Labs      02/06/18   0217   SODIUM  136   POTASSIUM  3.9   CHLORIDE  106   CO2  22   GLUCOSE  88   BUN  12   CREATININE  0.92   CALCIUM  8.9                      Assessment/Plan     * FTT (failure to thrive) in adult- (present on admission)   Assessment & Plan    Wife unable to care for him at home  SS to assist with placement- consider memory care center, snf or other group home.  Referral sent to [facility] skilled 1/28/18        Hemoptysis   Assessment & Plan    Resolved. 1/24/18--Had small bright red blood clog after coughing this morning, stable vitals, on RA  CBC with stable h/h noted to be 13.3/38.5 respectively on 1/23/18,  no white count, plt 177, renal function is normal.      CXR, CBC, CMP, Pt/INR all normal 1/24/15  monitor        Depression- (present on admission)   Assessment & Plan    abilify and lexapro , monitor          Hypokalemia   Assessment & Plan    Replaced and now stable        Vision loss of left eye- (present on admission)   Assessment & Plan    CT head neg, MRI brain - no acute CVA, evid for prior stroke.  No eye pain or signs of infxn.  Add asa, statin  Monitor         Suicidal ideation- (present on admission)   Assessment & Plan    No active SI. - felt likely assoc dementia and agitation with previous  associations with his son who committed suicide.  Psychiatry consulted appreciate rec.         Irritability- (present on admission)   Assessment & Plan      Continue with psych meds- Abilify monitor response.  Psych on board        Asymptomatic bacteriuria- (present on admission)   Assessment & Plan    Monitor for acute symptoms.         Hypernatremia- (present on admission)   Assessment & Plan    Resolved:Hypovolemic - corrected w .45 ns-- now normalized  DC IVF  Encourage intake.          Late onset Alzheimer's disease with behavioral disturbance- (present on admission)   Assessment & Plan    Periods of agitation, encourage frequent re-orientation  DC Donepezil as no improvement in memory noted  Continue escitalopram, memantine  dc Geodon for agiation , monitor at this time  Re directioning  SS for Dc planning.             Reviewed items::  Labs reviewed, Radiology images reviewed and Medications reviewed  Shaw catheter::  No Shaw  DVT prophylaxis pharmacological::  Heparin        Patient plan of care discussed at multidisplinary team rounds, with patient and R.N at beside.

## 2018-02-09 PROCEDURE — 92526 ORAL FUNCTION THERAPY: CPT

## 2018-02-09 PROCEDURE — 700102 HCHG RX REV CODE 250 W/ 637 OVERRIDE(OP): Performed by: INTERNAL MEDICINE

## 2018-02-09 PROCEDURE — 700102 HCHG RX REV CODE 250 W/ 637 OVERRIDE(OP): Performed by: HOSPITALIST

## 2018-02-09 PROCEDURE — A9270 NON-COVERED ITEM OR SERVICE: HCPCS | Performed by: INTERNAL MEDICINE

## 2018-02-09 PROCEDURE — A9270 NON-COVERED ITEM OR SERVICE: HCPCS | Performed by: HOSPITALIST

## 2018-02-09 PROCEDURE — A9270 NON-COVERED ITEM OR SERVICE: HCPCS | Performed by: PSYCHIATRY & NEUROLOGY

## 2018-02-09 PROCEDURE — 770006 HCHG ROOM/CARE - MED/SURG/GYN SEMI*

## 2018-02-09 PROCEDURE — 700111 HCHG RX REV CODE 636 W/ 250 OVERRIDE (IP): Performed by: INTERNAL MEDICINE

## 2018-02-09 PROCEDURE — 99232 SBSQ HOSP IP/OBS MODERATE 35: CPT | Performed by: HOSPITALIST

## 2018-02-09 PROCEDURE — 700102 HCHG RX REV CODE 250 W/ 637 OVERRIDE(OP): Performed by: PSYCHIATRY & NEUROLOGY

## 2018-02-09 RX ADMIN — ASPIRIN 81 MG: 81 TABLET, CHEWABLE ORAL at 08:46

## 2018-02-09 RX ADMIN — STANDARDIZED SENNA CONCENTRATE AND DOCUSATE SODIUM 1 TABLET: 8.6; 5 TABLET, FILM COATED ORAL at 08:47

## 2018-02-09 RX ADMIN — ARIPIPRAZOLE 2 MG: 2 TABLET ORAL at 08:48

## 2018-02-09 RX ADMIN — SIMVASTATIN 10 MG: 20 TABLET, FILM COATED ORAL at 20:46

## 2018-02-09 RX ADMIN — Medication 1000 MCG: at 08:45

## 2018-02-09 RX ADMIN — ENOXAPARIN SODIUM 40 MG: 100 INJECTION SUBCUTANEOUS at 08:46

## 2018-02-09 RX ADMIN — MEMANTINE HYDROCHLORIDE 10 MG: 10 TABLET ORAL at 20:46

## 2018-02-09 RX ADMIN — MONTELUKAST SODIUM 10 MG: 10 TABLET, COATED ORAL at 08:47

## 2018-02-09 RX ADMIN — MEMANTINE HYDROCHLORIDE 10 MG: 10 TABLET ORAL at 08:47

## 2018-02-09 RX ADMIN — ESCITALOPRAM OXALATE 20 MG: 10 TABLET ORAL at 08:46

## 2018-02-09 ASSESSMENT — PATIENT HEALTH QUESTIONNAIRE - PHQ9
SUM OF ALL RESPONSES TO PHQ9 QUESTIONS 1 AND 2: 0
2. FEELING DOWN, DEPRESSED, IRRITABLE, OR HOPELESS: NOT AT ALL
1. LITTLE INTEREST OR PLEASURE IN DOING THINGS: NOT AT ALL
2. FEELING DOWN, DEPRESSED, IRRITABLE, OR HOPELESS: NOT AT ALL
SUM OF ALL RESPONSES TO PHQ QUESTIONS 1-9: 0
SUM OF ALL RESPONSES TO PHQ QUESTIONS 1-9: 0
SUM OF ALL RESPONSES TO PHQ9 QUESTIONS 1 AND 2: 0
1. LITTLE INTEREST OR PLEASURE IN DOING THINGS: NOT AT ALL

## 2018-02-09 ASSESSMENT — ENCOUNTER SYMPTOMS
WEAKNESS: 1
ABDOMINAL PAIN: 0
COUGH: 0
VOMITING: 0

## 2018-02-09 ASSESSMENT — PAIN SCALES - GENERAL
PAINLEVEL_OUTOF10: 0
PAINLEVEL_OUTOF10: 0

## 2018-02-09 ASSESSMENT — LIFESTYLE VARIABLES: DO YOU DRINK ALCOHOL: NO

## 2018-02-09 NOTE — THERAPY
"Speech Language Therapy dysphagia treatment completed.   Functional Status: Tolerating regular diet.  Recommendations: Cont regular diet.    Plan of Care: Will f/u x 1 more session  Post-Acute Therapy: Currently anticipate no further skilled therapy needs once patient is discharged from the inpatient setting.    See \"Rehab Therapy-Acute\" Patient Summary Report for complete documentation.     "

## 2018-02-09 NOTE — DISCHARGE PLANNING
Medical Social Work    SHANITA received VM from Geno / [facility] this morning. Geno called to confirm receipt of faxed documents. Geno requesting copy of Approved Services to be faxed to her office. SHANITA faxed copy to [phone] @ 1605.     Geno also stated that they will do their best to have a room ready by Monday 2/12 but it is more realistic on their end that the room will be ready for pt to move-in on Tuesday 2/13. Pt's spouse is also meeting hipolito Lora on Monday 2/13.     Plan: Pt to transfer to [facility] on Tuesday 2/13.

## 2018-02-09 NOTE — PROGRESS NOTES
[facility]    Date of Service: 2018    Chief Complaint  82 y.o. Male w/h/o Alzheimer's, stroke  admitted 1/15/2018 hypernatremia and SI.   Reported wife  can no longer care for him.       Interval Problem Update    Has been up with assist. Discussed with wife plan of care - she is anxious for him  to tx to group home. States she has paid the retainer fee.    Consultants/Specialty  Geriatrics   Psychiatry    Disposition  Plan for discharge to [facility] possible early next week.        Review of Systems   Unable to perform ROS: Dementia   Constitutional:        Limited Ros     HENT: Positive for hearing loss. Negative for congestion.    Respiratory: Negative for cough.    Cardiovascular: Negative for chest pain.   Gastrointestinal: Negative for abdominal pain and vomiting.   Neurological: Positive for weakness.      Physical Exam  Laboratory/Imaging   Hemodynamics  Temp (24hrs), Av.8 °C (98.2 °F), Min:36.5 °C (97.7 °F), Max:37 °C (98.6 °F)   Temperature: 36.9 °C (98.5 °F)  Pulse  Av.8  Min: 59  Max: 108    Blood Pressure : 102/75      Respiratory      Respiration: 20, Pulse Oximetry: 97 %        RUL Breath Sounds: Clear, RML Breath Sounds: Clear, RLL Breath Sounds: Diminished, SANTY Breath Sounds: Clear, LLL Breath Sounds: Diminished    Fluids    Intake/Output Summary (Last 24 hours) at 18 1241  Last data filed at 18 0350   Gross per 24 hour   Intake             1080 ml   Output             1100 ml   Net              -20 ml       Nutrition  Orders Placed This Encounter   Procedures   • Diet Order     Standing Status:   Standing     Number of Occurrences:   1     Order Specific Question:   Diet:     Answer:   Regular [1]     Physical Exam   Constitutional: He appears well-developed. No distress.   HENT:   Head: Normocephalic and atraumatic.   Right Ear: External ear normal.   Left Ear: External ear normal.   Nose: Nose normal.   Eyes: Conjunctivae and EOM are normal. No  scleral icterus.   Neck: Neck supple.   Cardiovascular: Normal rate and regular rhythm.    No murmur heard.  Pulmonary/Chest: No stridor. He has no wheezes. He has no rales.   Abdominal: Soft. Bowel sounds are normal. He exhibits no distension. There is no tenderness.   Musculoskeletal: He exhibits no edema, tenderness or deformity.   Neurological: He is alert. No cranial nerve deficit.   Oriented to person and place   Skin: Skin is warm and dry. He is not diaphoretic. No pallor.   Psychiatric: His affect is not blunt and not labile. Cognition and memory are impaired. He does not express impulsivity.   Nursing note and vitals reviewed.                               Assessment/Plan     * FTT (failure to thrive) in adult- (present on admission)   Assessment & Plan    Wife unable to care for him at home  SS to assist with placement- consider memory care center, snf or other group home.  Referral sent to [facility] skilled 1/28/18        Hemoptysis   Assessment & Plan    Resolved. 1/24/18--Had small bright red blood clog after coughing this morning, stable vitals, on RA-- suspect Traumatic cough  No signs for infection, cbc stable.           Depression- (present on admission)   Assessment & Plan    Improved- more upbeat  abilify and lexapro  Monitor.           Hypokalemia   Assessment & Plan    Replaced and now stable        Vision loss of left eye- (present on admission)   Assessment & Plan    CT head neg, MRI brain - no acute CVA, evid for prior stroke.  No eye pain or signs of infxn.  Add asa, statin  Monitor         Suicidal ideation- (present on admission)   Assessment & Plan    No active SI or depressed affect.   Anti depressants.   Psychiatry consulted appreciate rec.         Irritability- (present on admission)   Assessment & Plan      Continue with psych meds- Abilify monitor response.  Psych on board        Asymptomatic bacteriuria- (present on admission)   Assessment & Plan    Monitor for acute symptoms.          Hypernatremia   Assessment & Plan    Resolved:Hypovolemic - corrected w .45 ns-- now normalized  DC IVF  Encourage intake.          Late onset Alzheimer's disease with behavioral disturbance- (present on admission)   Assessment & Plan    Periods of agitation-- improved with wife at bedside  frequent re-orientation  Control depression--Continue escitalopram, memantine  Wife unable to care for- plan dc to group home.             Reviewed items::  Labs reviewed, Radiology images reviewed and Medications reviewed  Shaw catheter::  No Shaw  DVT prophylaxis pharmacological::  Heparin        Discussed with wife, patient, RN plan of care at bedside and during  multidisplinary team rounds.

## 2018-02-10 PROCEDURE — A9270 NON-COVERED ITEM OR SERVICE: HCPCS | Performed by: HOSPITALIST

## 2018-02-10 PROCEDURE — 700102 HCHG RX REV CODE 250 W/ 637 OVERRIDE(OP): Performed by: HOSPITALIST

## 2018-02-10 PROCEDURE — 770006 HCHG ROOM/CARE - MED/SURG/GYN SEMI*

## 2018-02-10 PROCEDURE — 700102 HCHG RX REV CODE 250 W/ 637 OVERRIDE(OP): Performed by: PSYCHIATRY & NEUROLOGY

## 2018-02-10 PROCEDURE — 700102 HCHG RX REV CODE 250 W/ 637 OVERRIDE(OP): Performed by: INTERNAL MEDICINE

## 2018-02-10 PROCEDURE — A9270 NON-COVERED ITEM OR SERVICE: HCPCS | Performed by: INTERNAL MEDICINE

## 2018-02-10 PROCEDURE — 700111 HCHG RX REV CODE 636 W/ 250 OVERRIDE (IP): Performed by: INTERNAL MEDICINE

## 2018-02-10 PROCEDURE — A9270 NON-COVERED ITEM OR SERVICE: HCPCS | Performed by: PSYCHIATRY & NEUROLOGY

## 2018-02-10 PROCEDURE — 99232 SBSQ HOSP IP/OBS MODERATE 35: CPT | Performed by: HOSPITALIST

## 2018-02-10 RX ADMIN — Medication 1000 MCG: at 07:58

## 2018-02-10 RX ADMIN — MEMANTINE HYDROCHLORIDE 10 MG: 10 TABLET ORAL at 19:50

## 2018-02-10 RX ADMIN — MEMANTINE HYDROCHLORIDE 10 MG: 10 TABLET ORAL at 07:58

## 2018-02-10 RX ADMIN — ASPIRIN 81 MG: 81 TABLET, CHEWABLE ORAL at 07:58

## 2018-02-10 RX ADMIN — ENOXAPARIN SODIUM 40 MG: 100 INJECTION SUBCUTANEOUS at 07:58

## 2018-02-10 RX ADMIN — SIMVASTATIN 10 MG: 20 TABLET, FILM COATED ORAL at 19:50

## 2018-02-10 RX ADMIN — MONTELUKAST SODIUM 10 MG: 10 TABLET, COATED ORAL at 07:58

## 2018-02-10 RX ADMIN — ESCITALOPRAM OXALATE 20 MG: 10 TABLET ORAL at 07:57

## 2018-02-10 RX ADMIN — ARIPIPRAZOLE 2 MG: 2 TABLET ORAL at 08:06

## 2018-02-10 ASSESSMENT — PAIN SCALES - GENERAL
PAINLEVEL_OUTOF10: 0
PAINLEVEL_OUTOF10: 0

## 2018-02-10 ASSESSMENT — ENCOUNTER SYMPTOMS
ABDOMINAL PAIN: 0
VOMITING: 0
SHORTNESS OF BREATH: 0
WEAKNESS: 1

## 2018-02-10 NOTE — PROGRESS NOTES
[facility]    Date of Service: 2/10/2018    Chief Complaint  82 y.o. Male w/h/o Alzheimer's, stroke  admitted 1/15/2018 hypernatremia and SI.   Reported wife can no longer care for him.       Interval Problem Update    More upbeat. Wants to read.     Consultants/Specialty  Geriatrics   Psychiatry    Disposition  Plan for discharge to [facility] possible early next week.        Review of Systems   Unable to perform ROS: Dementia   Constitutional:        Limited Ros     HENT: Positive for hearing loss.    Respiratory: Negative for shortness of breath.    Cardiovascular: Negative for chest pain.   Gastrointestinal: Negative for abdominal pain and vomiting.   Neurological: Positive for weakness.      Physical Exam  Laboratory/Imaging   Hemodynamics  Temp (24hrs), Av.7 °C (98.1 °F), Min:36.3 °C (97.4 °F), Max:36.9 °C (98.4 °F)   Temperature: 36.8 °C (98.2 °F)  Pulse  Av.8  Min: 54  Max: 108    Blood Pressure : (!) 98/74 (RN Notified)      Respiratory      Respiration: 18, Pulse Oximetry: 95 %        RUL Breath Sounds: Clear, RML Breath Sounds: Clear, RLL Breath Sounds: Diminished, SANTY Breath Sounds: Clear, LLL Breath Sounds: Diminished    Fluids    Intake/Output Summary (Last 24 hours) at 02/10/18 0906  Last data filed at 02/10/18 0756   Gross per 24 hour   Intake             2160 ml   Output              600 ml   Net             1560 ml       Nutrition  Orders Placed This Encounter   Procedures   • Diet Order     Standing Status:   Standing     Number of Occurrences:   1     Order Specific Question:   Diet:     Answer:   Regular [1]     Physical Exam   Constitutional: He appears well-developed. No distress.   Calm, cooperative.    HENT:   Head: Normocephalic and atraumatic.   Right Ear: External ear normal.   Left Ear: External ear normal.   Nose: Nose normal.   Eyes: Conjunctivae and EOM are normal. No scleral icterus.   Cardiovascular: Normal rate and regular rhythm.    No murmur  heard.  Pulmonary/Chest: He has no wheezes. He has no rales.   Abdominal: Soft. Bowel sounds are normal. He exhibits no distension. There is no tenderness.   Musculoskeletal: He exhibits no edema, tenderness or deformity.   Neurological: He is alert. No cranial nerve deficit.   Following direction, o x person.    Skin: Skin is warm and dry. He is not diaphoretic. No pallor.   Psychiatric: Cognition and memory are impaired. He does not express impulsivity.   Nursing note and vitals reviewed.                               Assessment/Plan     * Late onset Alzheimer's disease with behavioral disturbance- (present on admission)   Assessment & Plan    Periods of agitation-- improved.   Continue Frequent re-orientation  Treat depression w Escitalopram  Trial of Namenda.  Stimulate environment- provide books.  plan dc to group home.         Depression- (present on admission)   Assessment & Plan    Stable.   abilify and lexapro  Monitor.           Suicidal ideation- (present on admission)   Assessment & Plan    No active SI or depressed affect.   Anti depressants.   Psychiatry consulted appreciate rec.         Hypokalemia   Assessment & Plan    Replaced and now stable        Vision loss of left eye- (present on admission)   Assessment & Plan    CT head neg, MRI brain - no acute CVA, evid for prior stroke.  No eye pain or signs of infxn.  Add asa, statin  Monitor         Irritability- (present on admission)   Assessment & Plan      Continue with psych meds- Abilify monitor response.  Psych on board        FTT (failure to thrive) in adult- (present on admission)   Assessment & Plan    Wife unable to care for him at home  Plan [facility].         Asymptomatic bacteriuria- (present on admission)   Assessment & Plan    Monitor for acute symptoms.         Hypernatremia   Assessment & Plan    Resolved:Hypovolemic - corrected w .45 ns-- now normalized  DC IVF  Encourage intake.          Hemoptysis   Assessment & Plan     Resolved. 1/24/18--Had small bright red blood clot after coughing this morning, stable vitals, on RA-- suspect Traumatic cough- stable resp symptoms.                 Reviewed items::  Labs reviewed, Radiology images reviewed and Medications reviewed  Shaw catheter::  No Shaw  DVT prophylaxis pharmacological::  Heparin        Discussed with patient, RN plan of care at bedside and during  multidisplinary team rounds.

## 2018-02-10 NOTE — PROGRESS NOTES
Received bedside report from day shift RN. Pt is alert and oriented x3, disoriented to time. Denies pain at this time. No complaints, requests coffee. Bed alarm on, call light within reach, bed in lowest position, and treaded slipper socks on pt. Cooperative, uses cane for ambulation

## 2018-02-10 NOTE — CARE PLAN
Problem: Safety  Goal: Will remain free from injury  Outcome: PROGRESSING AS EXPECTED  Pt has had no falls, calls for assistance, cooperative, treaded socks in place     Problem: Discharge Barriers/Planning  Goal: Patient's continuum of care needs will be met  Outcome: PROGRESSING AS EXPECTED  Pt will be placed into long term care on Monday

## 2018-02-10 NOTE — PROGRESS NOTES
Assumed care at 0715 - Patient is AOx4, disoriented to time and event.  No complaints of pain.  Complains of difficulty seeing out of left eye, pupil round/reactive to light, will notify MD.  Tolerated scheduled medications.  On room air.  Bed locked in lowest position, treaded socks in place, call light within reach, all needs met at this time.

## 2018-02-10 NOTE — CARE PLAN
Problem: Safety  Goal: Will remain free from falls  Outcome: PROGRESSING AS EXPECTED  Bed locked in lowest position, treaded socks in place, call light within reach, bed alarm on, room near nursing station.  Pt calls appropriately.     Problem: Discharge Barriers/Planning  Goal: Patient's continuum of care needs will be met  Outcome: PROGRESSING AS EXPECTED  Patient is pending possible d/c on 2/12 or 2/13 to [facility].

## 2018-02-11 PROCEDURE — A9270 NON-COVERED ITEM OR SERVICE: HCPCS | Performed by: PSYCHIATRY & NEUROLOGY

## 2018-02-11 PROCEDURE — 770006 HCHG ROOM/CARE - MED/SURG/GYN SEMI*

## 2018-02-11 PROCEDURE — A9270 NON-COVERED ITEM OR SERVICE: HCPCS | Performed by: HOSPITALIST

## 2018-02-11 PROCEDURE — 700102 HCHG RX REV CODE 250 W/ 637 OVERRIDE(OP): Performed by: HOSPITALIST

## 2018-02-11 PROCEDURE — 99232 SBSQ HOSP IP/OBS MODERATE 35: CPT | Performed by: HOSPITALIST

## 2018-02-11 PROCEDURE — 700102 HCHG RX REV CODE 250 W/ 637 OVERRIDE(OP): Performed by: PSYCHIATRY & NEUROLOGY

## 2018-02-11 PROCEDURE — 700102 HCHG RX REV CODE 250 W/ 637 OVERRIDE(OP): Performed by: INTERNAL MEDICINE

## 2018-02-11 PROCEDURE — A9270 NON-COVERED ITEM OR SERVICE: HCPCS | Performed by: INTERNAL MEDICINE

## 2018-02-11 PROCEDURE — 700111 HCHG RX REV CODE 636 W/ 250 OVERRIDE (IP): Performed by: INTERNAL MEDICINE

## 2018-02-11 RX ADMIN — ENOXAPARIN SODIUM 40 MG: 100 INJECTION SUBCUTANEOUS at 11:15

## 2018-02-11 RX ADMIN — Medication 1000 MCG: at 11:14

## 2018-02-11 RX ADMIN — ASPIRIN 81 MG: 81 TABLET, CHEWABLE ORAL at 11:14

## 2018-02-11 RX ADMIN — MEMANTINE HYDROCHLORIDE 10 MG: 10 TABLET ORAL at 11:14

## 2018-02-11 RX ADMIN — SIMVASTATIN 10 MG: 20 TABLET, FILM COATED ORAL at 20:27

## 2018-02-11 RX ADMIN — ESCITALOPRAM OXALATE 20 MG: 10 TABLET ORAL at 11:15

## 2018-02-11 RX ADMIN — MEMANTINE HYDROCHLORIDE 10 MG: 10 TABLET ORAL at 20:27

## 2018-02-11 RX ADMIN — MONTELUKAST SODIUM 10 MG: 10 TABLET, COATED ORAL at 11:14

## 2018-02-11 RX ADMIN — ARIPIPRAZOLE 2 MG: 2 TABLET ORAL at 11:14

## 2018-02-11 ASSESSMENT — PAIN SCALES - GENERAL
PAINLEVEL_OUTOF10: 0
PAINLEVEL_OUTOF10: 0

## 2018-02-11 ASSESSMENT — ENCOUNTER SYMPTOMS
FOCAL WEAKNESS: 0
FEVER: 0
SHORTNESS OF BREATH: 0
VOMITING: 0
CHILLS: 0
ABDOMINAL PAIN: 0
TREMORS: 0
SPEECH CHANGE: 0

## 2018-02-11 NOTE — PROGRESS NOTES
Received bedside report and accepted care of patient.  Patient currently resting in bed in no visible or stated distress.  Bed controls on and bed in locked position.  Bed alarm on.  Call light and personal possessions within reach.  Plan of care to include assisting patient with ADL's, encouraging patient to take medications and increase fluid intake (other than coffee).  Patient has already received a cup of coffee from this RN, and asks for coffee from all staff, all day.  Giving patient decaf coffee only at this time. Patient pending placement. Will continue to update notes/plan of care as needed throughout shift.

## 2018-02-11 NOTE — CARE PLAN
Problem: Safety  Goal: Will remain free from injury    Intervention: Provide assistance with mobility   02/10/18 0848   OTHER   Assistance / Tolerance Standby Assist     Intervention: Collaborate with Interdisciplinary Team for safe transfer and mobilization techniques   02/10/18 0848   OTHER   Assistive Devices Cane - single point       Goal: Will remain free from falls    Intervention: Assess risk factors for falls   02/09/18 2000   OTHER   Mobility Status Assessment 1-1 Healthcare Provider Required for Assistance with Ambulation & Transfer     Intervention: Implement fall precautions   02/09/18 2000 02/10/18 0758   OTHER   Environmental Precautions --  Treaded Slipper Socks on Patient;Personal Belongings, Wastebasket, Call Bell etc. in Easy Reach;Transferred to Stronger Side;Report Given to Other Health Care Providers Regarding Fall Risk;Bed in Low Position;Communication Sign for Patients & Families;Mobility Assessed & Appropriate Sign Placed   Bedrails Bedrails Closest to Bathroom Down --    Chair/Bed Strip Alarm Yes - Alarm On --    Bed Alarm (Built in - for ICU ONLY) Yes - Alarm On --          Problem: Infection  Goal: Will remain free from infection  Patient is afebrile. No s/sx of infection at this time.

## 2018-02-11 NOTE — PROGRESS NOTES
Received report from day shift RN, assumed care. Patient is awake, on bed/A&OX2 Up X 1 assist, denies pain, due medications given. Pending placement. Possible discharge to [facility] on Monday or Tuesday. Patient appears to be stable, lying in bed with no significant change in condition noted.     Bed alarm in use, call light and personal belongings within reach, bed kept low, treaded socks on. Assisted as necessary. Kept rested and comfortable at all times. Hourly rounds in place.

## 2018-02-11 NOTE — PROGRESS NOTES
[facility] Hospitalist Progress Note    Date of Service: 2018    Chief Complaint  82 y.o. Male w/h/o Alzheimer's, stroke  admitted 1/15/2018 hypernatremia and SI.   Reported wife can no longer care for him.       Interval Problem Update    Mildly hypotensive, Asympt. Good intake.     Consultants/Specialty  Geriatrics   Psychiatry    Disposition  Plan for discharge to [facility] early next week.        Review of Systems   Unable to perform ROS: Dementia   Constitutional: Negative for chills and fever.        Limited Ros     HENT: Positive for hearing loss.    Respiratory: Negative for shortness of breath.    Cardiovascular: Negative for chest pain.   Gastrointestinal: Negative for abdominal pain and vomiting.   Neurological: Negative for tremors, speech change and focal weakness.      Physical Exam  Laboratory/Imaging   Hemodynamics  Temp (24hrs), Av.1 °C (98.7 °F), Min:36.8 °C (98.3 °F), Max:37.3 °C (99.2 °F)   Temperature: 36.8 °C (98.3 °F)  Pulse  Av.9  Min: 54  Max: 108    Blood Pressure : (!) 93/67      Respiratory      Respiration: 18, Pulse Oximetry: 96 %        RUL Breath Sounds: Clear, RML Breath Sounds: Clear, RLL Breath Sounds: Diminished, SANTY Breath Sounds: Clear, LLL Breath Sounds: Diminished    Fluids    Intake/Output Summary (Last 24 hours) at 18 0745  Last data filed at 18 0500   Gross per 24 hour   Intake             1920 ml   Output              500 ml   Net             1420 ml       Nutrition  Orders Placed This Encounter   Procedures   • Diet Order     Standing Status:   Standing     Number of Occurrences:   1     Order Specific Question:   Diet:     Answer:   Regular [1]     Physical Exam   Constitutional: He appears well-developed. No distress.   Calm, cooperative.    HENT:   Head: Normocephalic and atraumatic.   Eyes: Conjunctivae and EOM are normal. No scleral icterus.   Cardiovascular: Normal rate and regular rhythm.    No murmur heard.  Pulmonary/Chest: No  stridor. He has no wheezes. He has no rales.   Abdominal: Soft. Bowel sounds are normal. He exhibits no distension. There is no tenderness.   Musculoskeletal: He exhibits no edema, tenderness or deformity.   Neurological: He is alert. No cranial nerve deficit.   Following direction, o x person.    Skin: Skin is warm and dry. He is not diaphoretic. No erythema.   Psychiatric: Cognition and memory are impaired. He does not express impulsivity.   Nursing note and vitals reviewed.                               Assessment/Plan     * Late onset Alzheimer's disease with behavioral disturbance- (present on admission)   Assessment & Plan    Periods of agitation--lately better.  Continue Frequent re-orientation, re assurance.  Treat depression w Escitalopram  Trial of Namenda.  With improved symptoms- plan dc to group home.         Depression- (present on admission)   Assessment & Plan    Stable.   abilify and lexapro  Stimulate environment.           Suicidal ideation- (present on admission)   Assessment & Plan    No active SI or depressed affect.   Continue Lexapro  Psychiatry consulted appreciate rec.         Hypokalemia   Assessment & Plan    Replaced and now stable        Vision loss of left eye- (present on admission)   Assessment & Plan    CT head neg, MRI brain - no acute CVA, evid for prior stroke.  No eye pain or signs of infxn  Continue asa, statin          Irritability- (present on admission)   Assessment & Plan    Continue current meds        FTT (failure to thrive) in adult- (present on admission)   Assessment & Plan    Wife unable to care for him at home  Plan [facility].         Asymptomatic bacteriuria- (present on admission)   Assessment & Plan    Monitor for acute symptoms.         Hypernatremia   Assessment & Plan    Resolved:Hypovolemic - corrected w .45 ns-- now normalized  DC IVF  Encourage intake.          Hemoptysis   Assessment & Plan    Resolved. 1/24/18--Had small bright red blood clot  after coughing this morning, stable vitals, on RA-- suspect Traumatic cough- stable resp symptoms.             Mild Hypotension- asympt- mobilize and observe for symptoms.      Reviewed items::  Medications reviewed  Shaw catheter::  No Shaw  DVT prophylaxis pharmacological::  Enoxaparin (Lovenox)        Discussed with patient, RN plan of care at bedside, med neph multi discipl team.

## 2018-02-12 PROCEDURE — 700111 HCHG RX REV CODE 636 W/ 250 OVERRIDE (IP): Performed by: INTERNAL MEDICINE

## 2018-02-12 PROCEDURE — 700102 HCHG RX REV CODE 250 W/ 637 OVERRIDE(OP): Performed by: PSYCHIATRY & NEUROLOGY

## 2018-02-12 PROCEDURE — 770006 HCHG ROOM/CARE - MED/SURG/GYN SEMI*

## 2018-02-12 PROCEDURE — 700102 HCHG RX REV CODE 250 W/ 637 OVERRIDE(OP): Performed by: HOSPITALIST

## 2018-02-12 PROCEDURE — 700102 HCHG RX REV CODE 250 W/ 637 OVERRIDE(OP): Performed by: INTERNAL MEDICINE

## 2018-02-12 PROCEDURE — A9270 NON-COVERED ITEM OR SERVICE: HCPCS | Performed by: PSYCHIATRY & NEUROLOGY

## 2018-02-12 PROCEDURE — A9270 NON-COVERED ITEM OR SERVICE: HCPCS | Performed by: HOSPITALIST

## 2018-02-12 PROCEDURE — 99231 SBSQ HOSP IP/OBS SF/LOW 25: CPT | Performed by: HOSPITALIST

## 2018-02-12 PROCEDURE — A9270 NON-COVERED ITEM OR SERVICE: HCPCS | Performed by: INTERNAL MEDICINE

## 2018-02-12 RX ADMIN — Medication 1000 MCG: at 07:58

## 2018-02-12 RX ADMIN — MEMANTINE HYDROCHLORIDE 10 MG: 10 TABLET ORAL at 20:19

## 2018-02-12 RX ADMIN — SIMVASTATIN 10 MG: 20 TABLET, FILM COATED ORAL at 20:19

## 2018-02-12 RX ADMIN — ARIPIPRAZOLE 2 MG: 2 TABLET ORAL at 08:01

## 2018-02-12 RX ADMIN — MONTELUKAST SODIUM 10 MG: 10 TABLET, COATED ORAL at 07:58

## 2018-02-12 RX ADMIN — MEMANTINE HYDROCHLORIDE 10 MG: 10 TABLET ORAL at 07:58

## 2018-02-12 RX ADMIN — ENOXAPARIN SODIUM 40 MG: 100 INJECTION SUBCUTANEOUS at 07:58

## 2018-02-12 RX ADMIN — ESCITALOPRAM OXALATE 20 MG: 10 TABLET ORAL at 07:58

## 2018-02-12 RX ADMIN — ASPIRIN 81 MG: 81 TABLET, CHEWABLE ORAL at 07:58

## 2018-02-12 RX ADMIN — POLYETHYLENE GLYCOL 3350 1 PACKET: 17 POWDER, FOR SOLUTION ORAL at 07:59

## 2018-02-12 ASSESSMENT — PAIN SCALES - GENERAL: PAINLEVEL_OUTOF10: 0

## 2018-02-12 NOTE — PROGRESS NOTES
[facility] Hospitalist Progress Note    Date of Service: 2018    Chief Complaint  82 y.o. Male w/h/o Alzheimer's, stroke  admitted 1/15/2018 hypernatremia and SI.   Reported wife can no longer care for him.       Interval Problem Update    No new co. Discussed with SS , anticipate will tx to group home this week.     Consultants/Specialty  Geriatrics   Psychiatry    Disposition  Plan for discharge to [facility] this week.        Review of Systems   Unable to perform ROS: Dementia   Constitutional:        Limited Ros- no co pain or signs of distress. Wants coffee.        Physical Exam  Laboratory/Imaging   Hemodynamics  Temp (24hrs), Av.8 °C (98.3 °F), Min:36.7 °C (98 °F), Max:36.9 °C (98.5 °F)   Temperature: 36.9 °C (98.5 °F)  Pulse  Av.7  Min: 54  Max: 108    Blood Pressure : 119/67      Respiratory      Respiration: 17, Pulse Oximetry: 96 %        RUL Breath Sounds: Clear, RML Breath Sounds: Clear, RLL Breath Sounds: Diminished, SANTY Breath Sounds: Clear, LLL Breath Sounds: Diminished    Fluids    Intake/Output Summary (Last 24 hours) at 18 0746  Last data filed at 18 0600   Gross per 24 hour   Intake              120 ml   Output             1020 ml   Net             -900 ml       Nutrition  Orders Placed This Encounter   Procedures   • Diet Order     Standing Status:   Standing     Number of Occurrences:   1     Order Specific Question:   Diet:     Answer:   Regular [1]     Physical Exam   Constitutional: He appears well-developed. No distress.   Calm, cooperative.    HENT:   Head: Normocephalic and atraumatic.   Eyes: Conjunctivae and EOM are normal. No scleral icterus.   Cardiovascular: Normal rate and regular rhythm.    No murmur heard.  Pulmonary/Chest: No stridor. He has no wheezes. He has no rales.   Abdominal: Soft. Bowel sounds are normal. He exhibits no distension. There is no tenderness.   Musculoskeletal: He exhibits no edema, tenderness or deformity.   Neurological: He  is alert. No cranial nerve deficit.   Following direction, o x person.    Skin: Skin is warm and dry. He is not diaphoretic. No erythema.   Psychiatric: Cognition and memory are impaired. He does not express impulsivity.   Nursing note and vitals reviewed.                               Assessment/Plan     * Late onset Alzheimer's disease with behavioral disturbance- (present on admission)   Assessment & Plan    Agitation has improved.  Continue Frequent re-orientation, re assurance.  Treat depression  Trial of Namenda.  Plan dc to group home.         Depression- (present on admission)   Assessment & Plan    Stable.   Continue Abilify and lexapro, stimulate environment.           Suicidal ideation- (present on admission)   Assessment & Plan    No active SI or depressed affect.   Continue Lexapro  Psychiatry has evaluated patient and cleared of suicide risk.        Hypokalemia   Assessment & Plan    Replaced and now stable        Vision loss of left eye- (present on admission)   Assessment & Plan    CT head neg, MRI brain - no acute CVA, evid for prior stroke.  No eye pain or signs of infxn  Continue asa, statin          Irritability- (present on admission)   Assessment & Plan    Redirect, re assurance  Continue current meds        FTT (failure to thrive) in adult- (present on admission)   Assessment & Plan    Wife unable to care for him at home  Plan [facility].         Asymptomatic bacteriuria- (present on admission)   Assessment & Plan    Monitor for acute symptoms.         Hypernatremia   Assessment & Plan    Resolved:Hypovolemic - corrected w .45 ns-- now normalized  DC IVF  Encourage intake.          Hemoptysis   Assessment & Plan    Resolved. 1/24/18--Had small bright red blood clot after coughing this morning-- suspect Traumatic cough- stable resp symptoms.   Monitor.            Mild Hypotension- resolved. Monitor.      Reviewed items::  Medications reviewed  Shaw catheter::  No Shaw  DVT  prophylaxis pharmacological::  Enoxaparin (Lovenox)        Discussed with patient, RN , med neph multi discipl team.

## 2018-02-12 NOTE — CARE PLAN
Problem: Safety  Goal: Will remain free from falls  Outcome: PROGRESSING AS EXPECTED  Bed alarm in use, call light within reach, pt belongings within reach, treaded slipper socks on, mobility/fall risk assessed and proper communication signs in place outside door.     Problem: Bowel/Gastric:  Goal: Normal bowel function is maintained or improved  Outcome: PROGRESSING AS EXPECTED  Last BM 2/8 per pt report. Pt refused AM stool softeners. Will perform education regarding bowel regimen and importance of taking stool softeners.

## 2018-02-12 NOTE — PROGRESS NOTES
Assumed care of pt. A/Ox2, disoriented to time and place. Re-oriented pt to environment and discussed plan of care. Educated pt on the importance of drinking water instead of coffee. Pt on room air, tolerating regular diet, up with one assist and FWW. Possible discharge today or tomorrow to [facility]. Bed alarm on, bed in lowest position, treaded socks on, personal belongings and call light within reach, instructed to call for any assistance

## 2018-02-12 NOTE — PROGRESS NOTES
Assumed care of pt from Tamir Merida. Pt has no complaints of pain/discomfort at this time. Hourly rounding in place.

## 2018-02-12 NOTE — DISCHARGE PLANNING
MSW called and left a message for pt's spouse to notify her of the 11am d/c time. MSW called Jose [facility]er and notified Geno, who states she would like the managers or supervisors signature on the approved services. MSW left  Supervisor Tena Adams a message about this request.

## 2018-02-12 NOTE — CARE PLAN
Problem: Safety  Goal: Will remain free from injury  Outcome: PROGRESSING AS EXPECTED  Built in bed alarm on, treaded socks on, bed in lowest position, call light within reach    Problem: Knowledge Deficit  Goal: Knowledge of disease process/condition, treatment plan, diagnostic tests, and medications will improve  Outcome: PROGRESSING AS EXPECTED  Educating pt on the importance of drinking adequate amounts of water and not just coffee     Problem: Discharge Barriers/Planning  Goal: Patient's continuum of care needs will be met  Outcome: PROGRESSING AS EXPECTED  Plan to discharge to [facility] tomorrow 2/13.

## 2018-02-12 NOTE — DISCHARGE PLANNING
MSW called and spoke with pt's spouse about pending discharge for tomorrow 2/13/18. MSW spoke with wife about IMM letter and provided Medicare phone number for appeal. Pt's spouse stated last Friday she put down the $500 deposit for [facility]'s community fee and also states she has a meeting with Makayla at [facility] February 26th to apply for Medicaid.

## 2018-02-12 NOTE — DISCHARGE PLANNING
Received Transportation Communication form, e-mailed [facility] to arrange Van Transport to [facility] tomorrow.

## 2018-02-12 NOTE — DISCHARGE PLANNING
Medical Social Work    SW called Geno w/ [facility] and SW spoke w/ Tania who confirmed that this pt is accepted and room is ready to move in on Tuesday 02/13 anytime before 1300. SHANITA completed transport form requesting 1100 transfer time and faxed request to [facility] ([phone]).     Plan: Pt to transfer to [facility] tomorrow 2/13/18.

## 2018-02-12 NOTE — DISCHARGE PLANNING
Transport has been arranged with Aisha at [facility] for 11:00 am tomorrow. Pt to discharge to [facility]. REJI ISRAEL Notified.

## 2018-02-13 ENCOUNTER — PATIENT OUTREACH (OUTPATIENT)
Dept: HEALTH INFORMATION MANAGEMENT | Facility: OTHER | Age: 83
End: 2018-02-13

## 2018-02-13 VITALS
HEART RATE: 71 BPM | OXYGEN SATURATION: 94 % | WEIGHT: 138.89 LBS | SYSTOLIC BLOOD PRESSURE: 110 MMHG | TEMPERATURE: 98.3 F | BODY MASS INDEX: 24.61 KG/M2 | HEIGHT: 63 IN | DIASTOLIC BLOOD PRESSURE: 83 MMHG | RESPIRATION RATE: 16 BRPM

## 2018-02-13 PROCEDURE — 700102 HCHG RX REV CODE 250 W/ 637 OVERRIDE(OP): Performed by: INTERNAL MEDICINE

## 2018-02-13 PROCEDURE — 700102 HCHG RX REV CODE 250 W/ 637 OVERRIDE(OP): Performed by: PSYCHIATRY & NEUROLOGY

## 2018-02-13 PROCEDURE — 700102 HCHG RX REV CODE 250 W/ 637 OVERRIDE(OP): Performed by: HOSPITALIST

## 2018-02-13 PROCEDURE — A9270 NON-COVERED ITEM OR SERVICE: HCPCS | Performed by: INTERNAL MEDICINE

## 2018-02-13 PROCEDURE — A9270 NON-COVERED ITEM OR SERVICE: HCPCS | Performed by: HOSPITALIST

## 2018-02-13 PROCEDURE — A9270 NON-COVERED ITEM OR SERVICE: HCPCS | Performed by: PSYCHIATRY & NEUROLOGY

## 2018-02-13 PROCEDURE — 99239 HOSP IP/OBS DSCHRG MGMT >30: CPT | Performed by: FAMILY MEDICINE

## 2018-02-13 RX ORDER — ARIPIPRAZOLE 2 MG/1
2 TABLET ORAL DAILY
Qty: 30 TAB | Refills: 0 | Status: SHIPPED | OUTPATIENT
Start: 2018-02-13 | End: 2018-02-27

## 2018-02-13 RX ORDER — SIMVASTATIN 10 MG
10 TABLET ORAL EVERY EVENING
Qty: 30 TAB | Refills: 11 | Status: SHIPPED | OUTPATIENT
Start: 2018-02-13 | End: 2018-02-27

## 2018-02-13 RX ORDER — ASPIRIN 81 MG/1
81 TABLET, CHEWABLE ORAL DAILY
Qty: 30 TAB | Refills: 0 | Status: SHIPPED | OUTPATIENT
Start: 2018-02-13 | End: 2018-02-27

## 2018-02-13 RX ADMIN — MEMANTINE HYDROCHLORIDE 10 MG: 10 TABLET ORAL at 08:28

## 2018-02-13 RX ADMIN — ESCITALOPRAM OXALATE 20 MG: 10 TABLET ORAL at 08:28

## 2018-02-13 RX ADMIN — ARIPIPRAZOLE 2 MG: 2 TABLET ORAL at 09:00

## 2018-02-13 RX ADMIN — ASPIRIN 81 MG: 81 TABLET, CHEWABLE ORAL at 08:28

## 2018-02-13 RX ADMIN — MONTELUKAST SODIUM 10 MG: 10 TABLET, COATED ORAL at 08:28

## 2018-02-13 RX ADMIN — Medication 1000 MCG: at 08:28

## 2018-02-13 ASSESSMENT — PAIN SCALES - GENERAL: PAINLEVEL_OUTOF10: 0

## 2018-02-13 NOTE — DISCHARGE PLANNING
CHITO faxed approved services to Malinda at [facility] that was signed by  and Care Transitions Director.

## 2018-02-13 NOTE — DISCHARGE INSTRUCTIONS
Discharge Instructions    Discharged to other by [facility] with escort. Discharged via wheelchair, hospital escort: Yes.  Special equipment needed: Not Applicable    Be sure to schedule a follow-up appointment with your primary care doctor or any specialists as instructed.     Discharge Plan:   Diet Plan: Discussed (regular)  Activity Level: Discussed (as tolerated)  Confirmed Follow up Appointment: No (Comments)  Confirmed Symptoms Management: Discussed  Influenza Vaccine Indication: Patient Refuses    I understand that a diet low in cholesterol, fat, and sodium is recommended for good health. Unless I have been given specific instructions below for another diet, I accept this instruction as my diet prescription.   Other diet: Regular    Special Instructions: Hypernatremia  Hypernatremia is when there is too much salt (sodium) in the blood and not enough water. The cells of the brain become starved of water. This balance of water and salt in the body is vital to human function. Hypernatremia is rare and is often an emergency. It can happen to anyone, but it usually occurs in infants, the elderly, or the critically ill. In severe cases, hypernatremia can be life-threatening.  CAUSES  Hypernatremia may be the result of:  · Not drinking enough water. Impaired thirst sensation usually contributes to this cause.  · A condition where your kidneys remove too much urine from the body (polyuria). This is often related to diabetes.  · Water loss through exercise or sweating.  · Diarrhea or vomiting.  · Excessive salt intake.  · A genetic condition affecting the kidneys.  SYMPTOMS  · Increased thirst (unless this sensation is impaired).  · Increased urination.  · Muscle twitching or cramps.  · Dizziness or lightheadedness.  · Seizures.  · Headache.  · Fatigue.  · Generalized weakness.  · Irritability.  · Confusion.  · Loss of consciousness.  · Coma.  Symptoms of underlying illness that can lead to hypernatremia may also be  present, such as:  · Nausea.  · Vomiting.  · Diarrhea.  · Fever.  · Confusion (delirium).  · Memory problems (dementia).  · Diabetes.  · A condition that affects the adrenal glands and results in too much cortisol (Cushing's syndrome).  DIAGNOSIS   Your caregiver will take your history and ask questions regarding water intake, potential water loss, urination, salt intake, medicines, and other symptoms. Your caregiver may also perform a physical or neurological exam. He or she may also order tests, such as blood and urine tests.  TREATMENT  Once diagnosed, hypernatremia needs to be treated right away. The goal is to balance the water and salt levels in the body. Treatment may be given through an intravenous line (IV) or given by mouth. Treatment often requires hospitalization for IV fluids and careful monitoring of lab tests. Treatment for hypernatremia will include intake of one or more of the following:  · Water.  · IV fluids.  Ongoing laboratory tests will be performed to help guide treatment options.  HOME CARE INSTRUCTIONS   · Drink enough fluids to keep your urine clear or pale yellow.  · Take all medicines as directed. Review all of your medicines with your caregiver regularly.  · Avoid high-salt processed foods (canned, rasheed, frozen, or boxed foods). Some examples include pickles, frozen dinners, canned soups, potato and corn chips, and olives.  · Always replace fluids after exercise or after vomiting or diarrhea.  · Manage underlying conditions that may cause hypernatremia. Ask your caregiver for additional support.  · Follow up with your caregiver for ongoing monitoring as directed.  SEEK MEDICAL CARE IF:   You have questions or concerns.  MAKE SURE YOU:  · Understand these instructions.  · Will watch your condition.  · Will get help right away if you are not doing well or get worse.     This information is not intended to replace advice given to you by your health care provider. Make sure you discuss any  questions you have with your health care provider.     Document Released: 03/11/2013 Document Reviewed: 03/11/2013  [employer] Interactive Patient Education ©2016 [employer]      · Is patient discharged on Warfarin / Coumadin?   No     Depression / Suicide Risk    As you are discharged from this [facility] facility, it is important to learn how to keep safe from harming yourself.    Recognize the warning signs:  · Abrupt changes in personality, positive or negative- including increase in energy   · Giving away possessions  · Change in eating patterns- significant weight changes-  positive or negative  · Change in sleeping patterns- unable to sleep or sleeping all the time   · Unwillingness or inability to communicate  · Depression  · Unusual sadness, discouragement and loneliness  · Talk of wanting to die  · Neglect of personal appearance   · Rebelliousness- reckless behavior  · Withdrawal from people/activities they love  · Confusion- inability to concentrate     If you or a loved one observes any of these behaviors or has concerns about self-harm, here's what you can do:  · Talk about it- your feelings and reasons for harming yourself  · Remove any means that you might use to hurt yourself (examples: pills, rope, extension cords, firearm)  · Get professional help from the community (Mental Health, Substance Abuse, psychological counseling)  · Do not be alone:Call your Safe Contact- someone whom you trust who will be there for you.  · Call your local CRISIS HOTLINE 759-2939 or 372-849-9606  · Call your local Children's Mobile Crisis Response Team [place] (538) 269-5478 or www.Recommendi  · Call the toll free National Suicide Prevention Hotlines   · National Suicide Prevention Lifeline 918-605-SDNW (3958)  · National Hope Line Network 800-SUICIDE (899-7375)

## 2018-02-13 NOTE — FACE TO FACE
Face to Face Supporting Documentation - Home Health    The encounter with this patient was in whole or in part the primary reason for home health admission.    Date of encounter:   Patient:                    MRN:                       YOB: 2017  Lui Denise  [id number]  1935     Home health to see patient for:  Skilled Nursing care for assessment, interventions & education    Skilled need for:  Exacerbation of Chronic Disease State FAILURE TO THRIVE    Skilled nursing interventions to include:  Comment: FAILURE TO THRIVE    Homebound status evidenced by:  Needs the assistance of another person in order to leave the home. Leaving home requires a considerable and taxing effort. There is a normal inability to leave the home.    Community Physician to provide follow up care: Mando Chin M.D.     Optional Interventions? No      I certify the face to face encounter for this home health care referral meets the CMS requirements and the encounter/clinical assessment with the patient was, in whole, or in part, for the medical condition(s) listed above, which is the primary reason for home health care. Based on my clinical findings: the service(s) are medically necessary, support the need for home health care, and the homebound criteria are met.  I certify that this patient has had a face to face encounter by myself.  Anne Lewis M.D. - NPI: [id number]

## 2018-02-13 NOTE — PROGRESS NOTES
Pt educated on use of bed alarm for a fall prevention measure; pt refuses bed alarm despite education. Pt A&O x2. Call light within reach, bed in low position, treaded slipper socks in use. Pt safety maintained.

## 2018-02-13 NOTE — PROGRESS NOTES
Received report, assumed care at 0700. Patient is alert and oriented to person and place only, ambulates to bathroom with 1 assist. Appropriate safety precautions are in place, refused bed alarm, calls appropriately.

## 2018-02-13 NOTE — DISCHARGE SUMMARY
CHIEF COMPLAINT ON ADMISSION  Chief Complaint   Patient presents with   • Psych Eval       CODE STATUS  DNR    HPI & HOSPITAL COURSE  This is a 82 y.o. male here with    Late onset Alzheimer's disease with behavioral disturbance- (present on admission)   Assessment & Plan     Agitation has improved.  Continue Frequent re-orientation, re assurance.  ON ARICEPT  ON ABILIFY       Depression- (present on admission)   Assessment & Plan     Stable.   LEXAPRO           Suicidal ideation- (present on admission)   Assessment & Plan     No active SI or depressed affect.   Continue Lexapro  Psychiatry has evaluated patient and cleared of suicide risk.       Hypokalemia   Assessment & Plan     HAS RESOLVED       Vision loss of left eye- (present on admission)   Assessment & Plan     CT head neg, MRI brain - no acute CVA, evid for prior stroke.  No eye pain or signs of infxn  Continue asa, statin          Irritability- (present on admission)   Assessment & Plan     Redirect, re assurance  Continue ABILIFY       FTT (failure to thrive) in adult- (present on admission)   Assessment & Plan     Wife unable to care for him at home  ACCEPTED TO A OUTSIDE FACILITY       Asymptomatic bacteriuria- (present on admission)   Assessment & Plan     ASYMPTOMATIC       Hypernatremia   Assessment & Plan     Resolved:     HOSPITAL PRSENTATION:82YR OLD M WITH DEMENTIA WAS ADMITTED ON 1/15/2018 WITH FAILURE TO THRIVE AND WIFE STATING THAT SHE IS NO LONGER ABLE TO PROVIDE CARE FOR THE PT.AT THE TIME OF ADMISSION HE WAS NOTED TO HAVE HYPERNATREMIA MOST LIKLEY 2ND TO DENYDRATION.HE WAS GIVEN IV FLUID AND THE HYPERNATREMIA RESOLVED.THERE WAS A ?OF SUICIDAL IDEATION,HOWEVER HE WAS SEEN BY PSYCH AND WAS CLEARED.PT DOES NOT EXPRESS OG ANY SUICIDAL THOGTHS.HE WAS GIVEN LEXAPRO FPO DEPRESSION AND FOR IRRITABILITY WAS GIVEN ABILIFY.HIS MRI OF THE HEAD IS NEGATIVE FOR ACUTE CVA.HE HAS BEEN ACCEPTED TO A OUTSIDE FACILIGY AND WILL BE DISCHARGED TODAY IN STABLE  CONDITION.PT WAS ALSO SEEN BY THE GERIATRIC AND PALLIATIVE SERVICES.    The patient met 2-midnight criteria for an inpatient stay at the time of discharge.    Therefore, he is discharged in good and stable condition with close outpatient follow-up.    SPECIFIC OUTPATIENT FOLLOW-UP  PCP IN 1 WEEK    DISCHARGE PROBLEM LIST  Principal Problem:    Late onset Alzheimer's disease with behavioral disturbance POA: Yes  Active Problems:    Suicidal ideation POA: Yes    Depression POA: Yes    Hemoptysis POA: Unknown    Asymptomatic bacteriuria POA: Yes    FTT (failure to thrive) in adult POA: Yes    Irritability POA: Yes    Vision loss of left eye POA: Yes    Hypokalemia POA: Unknown  Resolved Problems:    * No resolved hospital problems. *      FOLLOW UP  Future Appointments  Date Time Provider Department Center   4/5/2018 11:00 AM [facility] None     No follow-up provider specified.    MEDICATIONS ON DISCHARGE   Denise, [facility] Medication Instructions OLAMIDE:[id number]    Printed on:02/13/18 0666   Medication Information                      ARIPiprazole (ABILIFY) 2 MG tablet  Take 1 Tab by mouth every day.             aspirin (ASA) 81 MG Chew Tab chewable tablet  Take 1 Tab by mouth every day.             cyanocobalamin (VITAMIN B12) 1000 MCG Tab  Take 1 Tab by mouth every day.             donepezil (ARICEPT) 10 MG tablet  Take 1 Tab by mouth every 24 hours.             escitalopram (LEXAPRO) 20 MG tablet  TAKE ONE TABLET BY MOUTH ONE TIME DAILY             montelukast (SINGULAIR) 10 MG Tab  Take 1 Tab by mouth every day.             simvastatin (ZOCOR) 10 MG Tab  Take 1 Tab by mouth every evening.                 DIET  Orders Placed This Encounter   Procedures   • Diet Order     Standing Status:   Standing     Number of Occurrences:   1     Order Specific Question:   Diet:     Answer:   Regular [1]       ACTIVITY  As tolerated and directed by skilled nursing.  Weight bearing as  tolerated      CONSULTATIONS  PSYCH  GERIATRIC  PALLIATIVE    PROCEDURES  NONE    LABORATORY  Lab Results   Component Value Date/Time    SODIUM 136 02/06/2018 02:17 AM    POTASSIUM 3.9 02/06/2018 02:17 AM    CHLORIDE 106 02/06/2018 02:17 AM    CO2 22 02/06/2018 02:17 AM    GLUCOSE 88 02/06/2018 02:17 AM    BUN 12 02/06/2018 02:17 AM    CREATININE 0.92 02/06/2018 02:17 AM        Lab Results   Component Value Date/Time    WBC 7.2 01/24/2018 01:39 PM    HEMOGLOBIN 13.5 (L) 01/24/2018 01:39 PM    HEMATOCRIT 40.0 (L) 01/24/2018 01:39 PM    PLATELETCT 202 01/24/2018 01:39 PM        Total time of the discharge process exceeds 38 minutes

## 2018-02-13 NOTE — PROGRESS NOTES
Discharge orders noted. Patient is being discharged to [facility]. Instructions on diet, activity, medications and follow up were given, patient verbalized understanding but will need reinforcement. [facility] will be seeing patient.

## 2018-02-13 NOTE — CARE PLAN
Problem: Communication  Goal: The ability to communicate needs accurately and effectively will improve  Outcome: PROGRESSING AS EXPECTED  Communication board has been updated. Pt displays proper use of call light.     Problem: Safety  Goal: Will remain free from falls  Outcome: PROGRESSING AS EXPECTED  Pt educated on bed alarm for fall prevention measure. Pt refuses bed alarm despite education. Treaded slipper socks on, call light within reach, bed locked in lowest position.

## 2018-02-14 ENCOUNTER — PATIENT OUTREACH (OUTPATIENT)
Dept: HEALTH INFORMATION MANAGEMENT | Facility: OTHER | Age: 83
End: 2018-02-14

## 2018-02-15 ENCOUNTER — TELEPHONE (OUTPATIENT)
Dept: MEDICAL GROUP | Age: 83
End: 2018-02-15

## 2018-02-16 NOTE — TELEPHONE ENCOUNTER
[facility] called and stated that they are not going to be able to provide patient with skilled nursing until tomorrow.

## 2018-02-27 ENCOUNTER — OFFICE VISIT (OUTPATIENT)
Dept: MEDICAL GROUP | Age: 83
End: 2018-02-27
Payer: MEDICARE

## 2018-02-27 VITALS
SYSTOLIC BLOOD PRESSURE: 116 MMHG | DIASTOLIC BLOOD PRESSURE: 74 MMHG | RESPIRATION RATE: 16 BRPM | HEIGHT: 62 IN | BODY MASS INDEX: 24.48 KG/M2 | HEART RATE: 94 BPM | WEIGHT: 133 LBS | TEMPERATURE: 99.1 F | OXYGEN SATURATION: 95 %

## 2018-02-27 DIAGNOSIS — F02.818 LATE ONSET ALZHEIMER'S DISEASE WITH BEHAVIORAL DISTURBANCE (HCC): ICD-10-CM

## 2018-02-27 DIAGNOSIS — G30.1 LATE ONSET ALZHEIMER'S DISEASE WITH BEHAVIORAL DISTURBANCE (HCC): ICD-10-CM

## 2018-02-27 DIAGNOSIS — Z00.00 PREVENTATIVE HEALTH CARE: ICD-10-CM

## 2018-02-27 DIAGNOSIS — F41.9 ANXIETY: ICD-10-CM

## 2018-02-27 DIAGNOSIS — F32.0 MILD SINGLE CURRENT EPISODE OF MAJOR DEPRESSIVE DISORDER (HCC): ICD-10-CM

## 2018-02-27 PROCEDURE — 99214 OFFICE O/P EST MOD 30 MIN: CPT | Performed by: FAMILY MEDICINE

## 2018-02-27 RX ORDER — ERGOCALCIFEROL 1.25 MG/1
CAPSULE ORAL
COMMUNITY
End: 2018-02-27 | Stop reason: SDUPTHER

## 2018-02-27 RX ORDER — MEMANTINE HYDROCHLORIDE 10 MG/1
10 TABLET ORAL 2 TIMES DAILY
COMMUNITY
End: 2018-02-27 | Stop reason: SDUPTHER

## 2018-02-27 RX ORDER — ALPRAZOLAM 0.5 MG/1
0.5 TABLET ORAL NIGHTLY PRN
Qty: 30 TAB | Refills: 2 | Status: SHIPPED | OUTPATIENT
Start: 2018-02-27 | End: 2018-03-29

## 2018-02-27 RX ORDER — ERGOCALCIFEROL 1.25 MG/1
50000 CAPSULE ORAL
Qty: 90 CAP | Refills: 2 | Status: SHIPPED | OUTPATIENT
Start: 2018-02-27 | End: 2018-11-30 | Stop reason: SDUPTHER

## 2018-02-27 RX ORDER — POLYETHYLENE GLYCOL 3350 17 G/17G
17 POWDER, FOR SOLUTION ORAL DAILY
Qty: 1 EACH | Refills: 2 | Status: SHIPPED | OUTPATIENT
Start: 2018-02-27 | End: 2018-11-30 | Stop reason: SDUPTHER

## 2018-02-27 RX ORDER — ESCITALOPRAM OXALATE 20 MG/1
20 TABLET ORAL DAILY
Qty: 90 TAB | Refills: 2 | Status: SHIPPED | OUTPATIENT
Start: 2018-02-27 | End: 2018-02-27 | Stop reason: SDUPTHER

## 2018-02-27 RX ORDER — ESCITALOPRAM OXALATE 20 MG/1
20 TABLET ORAL DAILY
Qty: 90 TAB | Refills: 2 | Status: SHIPPED | OUTPATIENT
Start: 2018-02-27 | End: 2018-10-15 | Stop reason: SDUPTHER

## 2018-02-27 RX ORDER — SIMVASTATIN 10 MG
10 TABLET ORAL EVERY EVENING
Qty: 90 TAB | Refills: 2 | Status: SHIPPED | OUTPATIENT
Start: 2018-02-27 | End: 2018-10-15 | Stop reason: SDUPTHER

## 2018-02-27 RX ORDER — DONEPEZIL HYDROCHLORIDE 10 MG/1
10 TABLET, FILM COATED ORAL
Qty: 90 TAB | Refills: 2 | Status: SHIPPED | OUTPATIENT
Start: 2018-02-27 | End: 2018-02-27 | Stop reason: SDUPTHER

## 2018-02-27 RX ORDER — POLYETHYLENE GLYCOL 3350 17 G/17G
17 POWDER, FOR SOLUTION ORAL DAILY
COMMUNITY
End: 2018-02-27 | Stop reason: SDUPTHER

## 2018-02-27 RX ORDER — MEMANTINE HYDROCHLORIDE 10 MG/1
10 TABLET ORAL 2 TIMES DAILY
Qty: 180 TAB | Refills: 2 | Status: SHIPPED | OUTPATIENT
Start: 2018-02-27 | End: 2018-10-15 | Stop reason: SDUPTHER

## 2018-02-27 RX ORDER — DONEPEZIL HYDROCHLORIDE 10 MG/1
10 TABLET, FILM COATED ORAL
Qty: 90 TAB | Refills: 2 | Status: SHIPPED | OUTPATIENT
Start: 2018-02-27 | End: 2018-11-30 | Stop reason: SDUPTHER

## 2018-02-27 RX ORDER — ARIPIPRAZOLE 2 MG/1
2 TABLET ORAL DAILY
Qty: 90 TAB | Refills: 2 | Status: SHIPPED | OUTPATIENT
Start: 2018-02-27 | End: 2018-10-15 | Stop reason: SDUPTHER

## 2018-02-27 RX ORDER — MEMANTINE HYDROCHLORIDE 10 MG/1
10 TABLET ORAL 2 TIMES DAILY
Qty: 180 TAB | Refills: 2 | Status: SHIPPED | OUTPATIENT
Start: 2018-02-27 | End: 2018-02-27 | Stop reason: SDUPTHER

## 2018-02-27 RX ORDER — SIMVASTATIN 10 MG
10 TABLET ORAL EVERY EVENING
Qty: 90 TAB | Refills: 2 | Status: SHIPPED | OUTPATIENT
Start: 2018-02-27 | End: 2018-02-27 | Stop reason: SDUPTHER

## 2018-02-27 ASSESSMENT — PATIENT HEALTH QUESTIONNAIRE - PHQ9: CLINICAL INTERPRETATION OF PHQ2 SCORE: 0

## 2018-02-27 ASSESSMENT — PAIN SCALES - GENERAL: PAINLEVEL: NO PAIN

## 2018-02-27 NOTE — ASSESSMENT & PLAN NOTE
The patient is a 82-year-old male who presents to clinic after recent admission to the ER for Alzheimer's dementia with behavioral disturbances. Patient remembers nothing does not remember if he has a wife doesn't remember his children. Overall today he is not irritated and answers my questions appropriately. He is currently been placed at assisted living

## 2018-02-27 NOTE — ASSESSMENT & PLAN NOTE
The patient was recently admitted for disorientation and irritability due to advanced stage Alzheimer's disease. He is currently on Namenda as well as Aricept. Xanax was added for agitation when necessary. He is severely demented and does not remember his wife, he is not sure where his monies coming from, he becomes very frustrated because he can't remember anything.

## 2018-02-27 NOTE — PROGRESS NOTES
This medical record contains text that has been entered with the assistance of computer voice recognition and dictation software.  Therefore, it may contain unintended errors in text, spelling, punctuation, or grammar    Chief Complaint   Patient presents with   • Follow-Up     ER in [facility] Denise is a 82 y.o. male here evaluation and management of: Routine follow-up, depression, severe Alzheimer's dementia, failure to thrive      HPI:     Preventative health care  The patient is a 82-year-old male who presents to clinic after recent admission to the ER for Alzheimer's dementia with behavioral disturbances. Patient remembers nothing does not remember if he has a wife doesn't remember his children. Overall today he is not irritated and answers my questions appropriately. He is currently been placed at assisted living    Depression  Patient has been stable on Abilify and Lexapro  He is currently under the care of assisted living  They also control his medications    Late onset Alzheimer's disease with behavioral disturbance  The patient was recently admitted for disorientation and irritability due to advanced stage Alzheimer's disease. He is currently on Namenda as well as Aricept. Xanax was added for agitation when necessary. He is severely demented and does not remember his wife, he is not sure where his monies coming from, he becomes very frustrated because he can't remember anything.    Current medicines (including changes today)  Current Outpatient Prescriptions   Medication Sig Dispense Refill   • ALPRAZolam (XANAX) 0.5 MG Tab Take 1 Tab by mouth at bedtime as needed for Sleep for up to 30 days. 30 Tab 2   • aspirin EC (ECOTRIN) 81 MG Tablet Delayed Response Take 1 Tab by mouth every day. 360 Tab 2   • donepezil (ARICEPT) 10 MG tablet Take 1 Tab by mouth every 24 hours as needed. 90 Tab 2   • escitalopram (LEXAPRO) 20 MG tablet Take 1 Tab by mouth every day. 90 Tab 2   • memantine (NAMENDA) 10 MG Tab  Take 1 Tab by mouth 2 times a day. 180 Tab 2   • polyethylene glycol/lytes (MIRALAX) Pack Take 1 Packet by mouth every day. 1 Each 2   • simvastatin (ZOCOR) 10 MG Tab Take 1 Tab by mouth every evening. 90 Tab 2   • vitamin D, Ergocalciferol, (DRISDOL) 99554 units Cap capsule Take 1 Cap by mouth every 7 days. 90 Cap 2   • ARIPiprazole (ABILIFY) 2 MG tablet Take 1 Tab by mouth every day. 90 Tab 2   • ARIPiprazole (ABILIFY) 2 MG tablet Take 1 Tab by mouth every day. 30 Tab 0   • aspirin (ASA) 81 MG Chew Tab chewable tablet Take 1 Tab by mouth every day. 30 Tab 0   • cyanocobalamin (VITAMIN B12) 1000 MCG Tab Take 1 Tab by mouth every day. 30 Tab 3   • simvastatin (ZOCOR) 10 MG Tab Take 1 Tab by mouth every evening. 30 Tab 11   • montelukast (SINGULAIR) 10 MG Tab Take 1 Tab by mouth every day. 90 Tab 1   • escitalopram (LEXAPRO) 20 MG tablet TAKE ONE TABLET BY MOUTH ONE TIME DAILY 90 Tab 0   • donepezil (ARICEPT) 10 MG tablet Take 1 Tab by mouth every 24 hours. 90 Tab 0     No current facility-administered medications for this visit.      He  has a past medical history of Alzheimer's dementia and Stroke (CMS-HCC).  He  has a past surgical history that includes hernia repair.  Social History   Substance Use Topics   • Smoking status: Former Smoker     Packs/day: 3.00     Years: 48.00     Types: Cigarettes     Quit date: 3/19/1996   • Smokeless tobacco: Former User     Types: Chew     Quit date: 3/19/1968   • Alcohol use No      Comment: recoverying alcoholic  1973     Social History     Social History Narrative   • No narrative on file     Family History   Problem Relation Age of Onset   • No Known Problems Mother    • No Known Problems Father    • No Known Problems Maternal Grandmother    • No Known Problems Maternal Grandfather    • No Known Problems Paternal Grandmother    • No Known Problems Paternal Grandfather      Family Status   Relation Status   • Mother    • Father    • Sister Alive   •  "Maternal Grandmother    • Maternal Grandfather    • Paternal Grandmother    • Paternal Grandfather          ROS    Please see hpi     All other systems reviewed and are negative     Objective:     Blood pressure 116/74, pulse 94, temperature 37.3 °C (99.1 °F), resp. rate 16, height 1.575 m (5' 2\"), weight 60.3 kg (133 lb), SpO2 95 %. Body mass index is 24.33 kg/m².  Physical Exam:    Constitutional: Alert, no distress.  Skin: Warm, dry, good turgor, no rashes in visible areas.  Eye: Equal, round and reactive, conjunctiva clear, lids normal.  ENMT: Lips without lesions, good dentition, oropharynx clear.  Neck: Trachea midline, no masses, no thyromegaly. No cervical or supraclavicular lymphadenopathy.  Respiratory: Unlabored respiratory effort, lungs clear to auscultation, no wheezes, no ronchi.  Cardiovascular: Normal S1, S2, no murmur, no edema.  Abdomen: Soft, non-tender, no masses, no hepatosplenomegaly.  Psych: Alert and oriented x3, normal affect and mood.          Assessment and Plan:   The following treatment plan was discussed      1. Late onset Alzheimer's disease with behavioral disturbance  Very poor prognosis  We will continue Namenda and Aricept  Xanax when necessary  Continue to reorient frequently    2. Anxiety    He will need Xanax when necessary agitation    - ALPRAZolam (XANAX) 0.5 MG Tab; Take 1 Tab by mouth at bedtime as needed for Sleep for up to 30 days.  Dispense: 30 Tab; Refill: 2    3. Mild single current episode of major depressive disorder (CMS-HCC)  Continue Abilify and Lexapro      4. Preventative health care  Up to date on HMI  His wife refused vaccination        HEALTH MAINTENANCE:    Instructed to Follow up in clinic or ER for worsening symptoms, difficulty breathing, lack of expected recovery, or should new symptoms or problems arise.    Followup: Return in about 2 months (around 2018) for Reevaluation.       Once again this medical record contains " text that has been entered with the assistance of computer voice recognition and dictation software.  Therefore, it may contain unintended errors in text, spelling, punctuation, or grammar

## 2018-02-27 NOTE — ASSESSMENT & PLAN NOTE
Patient has been stable on Abilify and Lexapro  He is currently under the care of assisted living  They also control his medications

## 2018-03-27 ENCOUNTER — PATIENT OUTREACH (OUTPATIENT)
Dept: HEALTH INFORMATION MANAGEMENT | Facility: OTHER | Age: 83
End: 2018-03-27

## 2018-03-27 NOTE — PROGRESS NOTES
Patient Lui Denise was admitted to [facility] on 1/15/18 for Alzheimer's disease with behavioral disturbance.  The patient was discharged to [facility] and instructed to follow up with his PCP.  Patient Advocate left several messages for the  of the [facility] as well as the patient's emergency contact.  Patient Advocate received one return voicemail from the  but this Patient Advocate was never able to reach the  again and never received a return call from the emergency  contact. The patient was a no show for his PCP appointment so this Patient Advocate called and left another VM for the  to make her aware.  A return call was not received, however, the appointment was rescheduled that day and the patient followed up on 2/27/18.  Patient Advocate confirmed that [facility]  began service on 2/18/18.  The patient is scheduled for 1 future appointment for a sleep study on 4/5/18.

## 2018-03-28 NOTE — ASSESSMENT & PLAN NOTE
Patient has difficulty seeing due to age-related cataract, however there financial hardship prevents him from going to see the ophthalmologist for now. They planned going next year.   rolling walker

## 2018-04-19 ENCOUNTER — TELEPHONE (OUTPATIENT)
Dept: MEDICAL GROUP | Age: 83
End: 2018-04-19

## 2018-04-19 NOTE — TELEPHONE ENCOUNTER
1. Caller Name: [facility]                                         Call Back Number: ph [phone] fax [phone]          Communication with physician   -pt would like OT evaluation for in home safety with transfers/ambulation  -pt request to be included with Home Health services to help

## 2018-04-24 ENCOUNTER — TELEPHONE (OUTPATIENT)
Dept: MEDICAL GROUP | Age: 83
End: 2018-04-24

## 2018-04-24 NOTE — TELEPHONE ENCOUNTER
1. Caller Name: [facility]                                         Call Back Number: [phone] fax [phone]    2. Physical Therapy paperwork received from [facility] requiring provider signature.     3. All appropriate fields completed by Medical Assistant: N/A CMA printed and distributed to MA    4. Paperwork placed in MA folder/basket to process.

## 2018-05-03 NOTE — TELEPHONE ENCOUNTER
2. Physical Therapy paperwork received from [facility] requiring provider signature.     3. All appropriate fields completed by Medical Assistant: Yes    4. Paperwork handed to provider to sign then scanned to patient's chart and faxed to [facility]

## 2018-05-16 ENCOUNTER — PATIENT OUTREACH (OUTPATIENT)
Dept: HEALTH INFORMATION MANAGEMENT | Facility: OTHER | Age: 83
End: 2018-05-16

## 2018-05-21 NOTE — PROGRESS NOTES
Outcome: Left Message    Please transfer to Patient Outreach Team at [phone] when patient returns call.      Attempt # 1

## 2018-10-06 NOTE — PROGRESS NOTES
Outcome: Left Message    Please transfer to Patient Outreach Team at [phone] when patient returns call.    WebIZ Checked & Epic Updated:  yes  PPSV23   Td (adult), adsorbed   Influenza w/preserv.   Zoster Ganesh (Shingrix)     HealthConnect Verified: yes  Effective Date: 1/1/2018     Attempt # 1

## 2018-10-16 RX ORDER — SIMVASTATIN 10 MG
TABLET ORAL
Qty: 90 TAB | Refills: 10 | Status: SHIPPED | OUTPATIENT
Start: 2018-10-16 | End: 2020-01-01

## 2018-10-16 RX ORDER — ARIPIPRAZOLE 2 MG/1
TABLET ORAL
Qty: 90 TAB | Refills: 0 | Status: SHIPPED | OUTPATIENT
Start: 2018-10-16 | End: 2019-01-09 | Stop reason: SDUPTHER

## 2018-10-16 RX ORDER — ESCITALOPRAM OXALATE 20 MG/1
TABLET ORAL
Qty: 90 TAB | Refills: 0 | Status: SHIPPED | OUTPATIENT
Start: 2018-10-16 | End: 2019-01-09 | Stop reason: SDUPTHER

## 2018-10-16 RX ORDER — MEMANTINE HYDROCHLORIDE 10 MG/1
TABLET ORAL
Qty: 90 TAB | Refills: 0 | Status: SHIPPED | OUTPATIENT
Start: 2018-10-16 | End: 2018-11-09 | Stop reason: SDUPTHER

## 2018-10-27 NOTE — PROGRESS NOTES
Outcome: Left Message    Please transfer to Patient Outreach Team at [phone] when patient returns call.    Attempt # 2

## 2018-11-06 NOTE — PROGRESS NOTES
Outcome: Left Message    Please transfer to Patient Outreach Team at [phone] when patient returns call.    Attempt # 3

## 2018-11-10 NOTE — PROGRESS NOTES
Outcome: Left Message    Please transfer to Patient Outreach Team at [phone] when patient returns call.    Attempt # 4

## 2018-11-13 RX ORDER — MEMANTINE HYDROCHLORIDE 10 MG/1
TABLET ORAL
Qty: 90 TAB | Refills: 0 | Status: SHIPPED | OUTPATIENT
Start: 2018-11-13 | End: 2018-12-10 | Stop reason: SDUPTHER

## 2018-11-30 DIAGNOSIS — F02.818 LATE ONSET ALZHEIMER'S DISEASE WITH BEHAVIORAL DISTURBANCE (HCC): ICD-10-CM

## 2018-11-30 DIAGNOSIS — G30.1 LATE ONSET ALZHEIMER'S DISEASE WITH BEHAVIORAL DISTURBANCE (HCC): ICD-10-CM

## 2018-11-30 DIAGNOSIS — E55.9 VITAMIN D DEFICIENCY: ICD-10-CM

## 2018-11-30 DIAGNOSIS — K59.00 CONSTIPATION, UNSPECIFIED CONSTIPATION TYPE: ICD-10-CM

## 2018-11-30 RX ORDER — POLYETHYLENE GLYCOL 3350 17 G/17G
17 POWDER, FOR SOLUTION ORAL DAILY
Qty: 1 EACH | Refills: 2 | Status: SHIPPED | OUTPATIENT
Start: 2018-11-30

## 2018-11-30 RX ORDER — ERGOCALCIFEROL 1.25 MG/1
50000 CAPSULE ORAL
Qty: 90 CAP | Refills: 2 | Status: SHIPPED | OUTPATIENT
Start: 2018-11-30

## 2018-11-30 RX ORDER — DONEPEZIL HYDROCHLORIDE 10 MG/1
10 TABLET, FILM COATED ORAL
Qty: 90 TAB | Refills: 2 | Status: SHIPPED | OUTPATIENT
Start: 2018-11-30 | End: 2020-01-01

## 2018-12-05 NOTE — TELEPHONE ENCOUNTER
prescription called into     [facility] [address]  [address]  [place] [address]  Phone: [phone] Fax: [phone]

## 2018-12-12 RX ORDER — MEMANTINE HYDROCHLORIDE 10 MG/1
TABLET ORAL
Qty: 90 TAB | Refills: 0 | Status: SHIPPED | OUTPATIENT
Start: 2018-12-12 | End: 2019-01-09 | Stop reason: SDUPTHER

## 2018-12-14 ENCOUNTER — TELEPHONE (OUTPATIENT)
Dept: MEDICAL GROUP | Age: 83
End: 2018-12-14

## 2018-12-14 NOTE — LETTER
December 20, 2018       Patient: Lui Denise   YOB: 1935   Date of Visit: 12/14/2018         To Whom It May Concern:    It is my medical opinion that Lui Denise discontinue Vitamin D3 1000 units daily.     If you have any questions or concerns, please don't hesitate to call [phone]          Sincerely,          Mando Chin M.D.  Electronically Signed

## 2018-12-15 NOTE — TELEPHONE ENCOUNTER
1. Caller Name: [facility]                                         Call Back Number: [phone]      Patient approves a detailed voicemail message: N\A    Informed assisted living facility that pharmacy received the refill and medication has already been dispensed for the Pt.    Staff stated their manager is gone for the day and would like us to call back next week.

## 2018-12-15 NOTE — TELEPHONE ENCOUNTER
VOICEMAIL  1. Caller Name: [facility]                      Call Back Number: [phone]    2. Message: VM was left asking for a Vitamin D refill as Pt is currently out     3. Patient approves office to leave a detailed voicemail/MyChart message: N\A

## 2018-12-15 NOTE — TELEPHONE ENCOUNTER
1. Caller Name:   [facility] [place] - [address]  [address]  [place] [address]  Phone: [phone] Fax: [phone]    Spoke with pharmacy- Vitamin D refill was received on 11/30/18 and has already been dispensed to Pt

## 2018-12-18 NOTE — TELEPHONE ENCOUNTER
Phone Number Called: [phone]    Message: Called [facility] and spoke to Nenita. Informed her that the medication was refilled on 11/30/2018.

## 2018-12-19 NOTE — TELEPHONE ENCOUNTER
1. Caller Name: [facility]                                         Call Back Number: [phone]      Patient approves a detailed voicemail message: yes    [facility] needs an order to discontinue Vitamin D3, but continue Vitamin D.  This order needs to be faxed to assisted living facility

## 2018-12-19 NOTE — TELEPHONE ENCOUNTER
Phone Number Called: [phone]    Message: LVM for director on secure .  Left  with STAT office fax number for order form so we can discontinue Vitamin D3.     Left Message for patient to call back: yes

## 2018-12-20 ENCOUNTER — TELEPHONE (OUTPATIENT)
Dept: MEDICAL GROUP | Age: 83
End: 2018-12-20

## 2018-12-21 NOTE — TELEPHONE ENCOUNTER
VOICEMAIL  1. Caller Name: [facility]                        Call Back Number: [phone]     2. Message: A letter on [facility] letter head with provider's signature is sufficient to stop medication.    3. Patient approves office to leave a detailed voicemail/MyChart message: yes    Fax to [phone] [facility] it is done

## 2019-01-03 ENCOUNTER — TELEPHONE (OUTPATIENT)
Dept: MEDICAL GROUP | Age: 84
End: 2019-01-03

## 2019-01-03 NOTE — TELEPHONE ENCOUNTER
VOICEMAIL  1. Caller Name: [facility]                      Call Back Number: [phone]    2. Message: VM was left requesting a refill of montelukast.      Pt has not been prescribed medication since January 2018.. Please advise     3. Patient approves office to leave a detailed voicemail/MyChart message: yes

## 2019-01-04 RX ORDER — MONTELUKAST SODIUM 10 MG/1
10 TABLET ORAL DAILY
Qty: 30 TAB | Refills: 2 | Status: SHIPPED | OUTPATIENT
Start: 2019-01-04 | End: 2019-03-05 | Stop reason: SDUPTHER

## 2019-01-10 RX ORDER — ESCITALOPRAM OXALATE 20 MG/1
TABLET ORAL
Qty: 90 TAB | Refills: 10 | Status: SHIPPED | OUTPATIENT
Start: 2019-01-10 | End: 2020-01-01

## 2019-01-10 RX ORDER — ARIPIPRAZOLE 2 MG/1
TABLET ORAL
Qty: 90 TAB | Refills: 10 | Status: SHIPPED | OUTPATIENT
Start: 2019-01-10 | End: 2020-01-01

## 2019-01-10 RX ORDER — MEMANTINE HYDROCHLORIDE 10 MG/1
TABLET ORAL
Qty: 90 TAB | Refills: 10 | Status: SHIPPED | OUTPATIENT
Start: 2019-01-10 | End: 2020-01-01

## 2019-01-14 ENCOUNTER — TELEPHONE (OUTPATIENT)
Dept: MEDICAL GROUP | Age: 84
End: 2019-01-14

## 2019-01-15 NOTE — TELEPHONE ENCOUNTER
1. Caller Name: [facility]                                         Call Back Number: [phone]    2. SPECIFIC Action To Be Taken: Tb Quantiferon Test requested by assisted living, please advise.     \

## 2019-01-16 DIAGNOSIS — Z00.00 PREVENTATIVE HEALTH CARE: ICD-10-CM

## 2019-01-25 ENCOUNTER — TELEPHONE (OUTPATIENT)
Dept: MEDICAL GROUP | Age: 84
End: 2019-01-25

## 2019-01-26 NOTE — TELEPHONE ENCOUNTER
VOICEMAIL  1. Caller Name: [facility]                      Call Back Number: [phone]    2. Message: Nurse left VM questioning if TB Gold lab was ordered.    Phone number above needs to be called and advised lab is ready to be completed.  Number listed is confidential and OK to leave detailed message    3. Patient approves office to leave a detailed voicemail/MyChart message: yes

## 2019-01-29 NOTE — TELEPHONE ENCOUNTER
Phone Number Called: [phone]    Message: VM left disclosing lab has been ordered     Left Message for patient to call back: yes

## 2019-02-01 ENCOUNTER — HOSPITAL ENCOUNTER (OUTPATIENT)
Dept: LAB | Facility: MEDICAL CENTER | Age: 84
End: 2019-02-01
Attending: FAMILY MEDICINE
Payer: MEDICARE

## 2019-02-01 DIAGNOSIS — Z00.00 PREVENTATIVE HEALTH CARE: ICD-10-CM

## 2019-02-01 PROCEDURE — 86480 TB TEST CELL IMMUN MEASURE: CPT

## 2019-02-01 PROCEDURE — 36415 COLL VENOUS BLD VENIPUNCTURE: CPT

## 2019-02-04 LAB
GAMMA INTERFERON BACKGROUND BLD IA-ACNC: 0.28 IU/ML
M TB IFN-G BLD-IMP: NEGATIVE
M TB IFN-G CD4+ BCKGRND COR BLD-ACNC: -0.2 IU/ML
MITOGEN IGNF BCKGRD COR BLD-ACNC: >10 IU/ML
QFT TB2 - NIL TBQ2: -0.1 IU/ML

## 2019-03-05 DIAGNOSIS — J30.1 CHRONIC SEASONAL ALLERGIC RHINITIS DUE TO POLLEN: ICD-10-CM

## 2019-03-06 RX ORDER — MONTELUKAST SODIUM 10 MG/1
10 TABLET ORAL DAILY
Qty: 30 TAB | Refills: 0 | Status: SHIPPED | OUTPATIENT
Start: 2019-03-06 | End: 2019-03-29 | Stop reason: SDUPTHER

## 2019-03-29 DIAGNOSIS — J30.1 CHRONIC SEASONAL ALLERGIC RHINITIS DUE TO POLLEN: ICD-10-CM

## 2019-04-02 ENCOUNTER — TELEPHONE (OUTPATIENT)
Dept: MEDICAL GROUP | Age: 84
End: 2019-04-02

## 2019-04-02 RX ORDER — MONTELUKAST SODIUM 10 MG/1
10 TABLET ORAL DAILY
Qty: 30 TAB | Refills: 0 | Status: SHIPPED | OUTPATIENT
Start: 2019-04-02 | End: 2019-04-26 | Stop reason: SDUPTHER

## 2019-04-02 NOTE — TELEPHONE ENCOUNTER
ESTABLISHED PATIENT PRE-VISIT PLANNING     Patient was NOT contacted to complete PVP.     Note: Patient will not be contacted if there is no indication to call.     1.  Reviewed notes from the last few office visits within the medical group: Yes    2.  If any orders were placed at last visit or intended to be done for this visit (i.e. 6 mos follow-up), do we have Results/Consult Notes?        •  Labs - Labs ordered, completed on 2/1/19 and results are in chart.   Note: If patient appointment is for lab review and patient did not complete labs, check with provider if OK to reschedule patient until labs completed.       •  Imaging - Imaging was not ordered at last office visit.       •  Referrals - No referrals were ordered at last office visit.    3. Is this appointment scheduled as a Hospital Follow-Up? No    4.  Immunizations were updated in Epic using WebIZ?: Epic matches WebIZ       •  Web Iz Recommendations: PNEUMOVAX (PPSV23) and SHINGRIX (Shingles)    5.  Patient is due for the following Health Maintenance Topics:   Health Maintenance Due   Topic Date Due   • Annual Wellness Visit  1935   • IMM ZOSTER VACCINES (1 of 2) 03/22/1986   • IMM PNEUMOCOCCAL 65+ (ADULT) LOW/MEDIUM RISK SERIES (2 of 2 - PPSV23) 06/22/2018       - Patient is up-to-date on all Health Maintenance topics. No records have been requested at this time.    6. Orders for overdue Health Maintenance topics pended in Pre-Charting? N\A    7.  AHA (MDX) form printed for Provider? YES    8.  Patient was NOT informed to arrive 15 min prior to their scheduled appointment and bring in their medication bottles.

## 2019-04-16 ENCOUNTER — PATIENT OUTREACH (OUTPATIENT)
Dept: HEALTH INFORMATION MANAGEMENT | Facility: OTHER | Age: 84
End: 2019-04-16

## 2019-04-16 NOTE — PROGRESS NOTES
Outcome: Left voicemail/ message    Please transfer to [facility]  [phone] when patient returns call.     WebIZ Checked & Epic Updated:  yes     HealthConnect Verified: yes     Attempt # 1

## 2019-04-26 DIAGNOSIS — J30.1 CHRONIC SEASONAL ALLERGIC RHINITIS DUE TO POLLEN: ICD-10-CM

## 2019-04-26 RX ORDER — MONTELUKAST SODIUM 10 MG/1
10 TABLET ORAL DAILY
Qty: 30 TAB | Refills: 0 | Status: SHIPPED | OUTPATIENT
Start: 2019-04-26 | End: 2019-05-29 | Stop reason: SDUPTHER

## 2019-05-03 RX ORDER — POLYETHYLENE GLYCOL 3350 17 G/17G
POWDER, FOR SOLUTION ORAL
Qty: 527 G | Refills: 0 | Status: SHIPPED | OUTPATIENT
Start: 2019-05-03

## 2019-05-29 DIAGNOSIS — J30.1 CHRONIC SEASONAL ALLERGIC RHINITIS DUE TO POLLEN: ICD-10-CM

## 2019-05-31 RX ORDER — MONTELUKAST SODIUM 10 MG/1
10 TABLET ORAL DAILY
Qty: 30 TAB | Refills: 0 | Status: SHIPPED | OUTPATIENT
Start: 2019-05-31 | End: 2019-06-25 | Stop reason: SDUPTHER

## 2019-05-31 NOTE — PALLIATIVE CARE
HPI:     Back Pain   This is a chronic problem. The current episode started more than 1 month ago. The problem occurs constantly. The problem has been gradually worsening since onset. The pain is present in the lumbar spine. The quality of the pain is described as aching. The pain radiates to the left thigh. The pain is moderate. The pain is the same all the time. The symptoms are aggravated by twisting and standing. Stiffness is present all day. Associated symptoms include leg pain and weakness. Pertinent negatives include no abdominal pain, bladder incontinence or chest pain. Also has RA. Sees rheum, takes tramadol for chronic pain. X-ray L spine from 2017 with degen changes and L3/4 spondylolisthesis. Has done PT, tried dry needling with short lived benefit. Has left hip and R knee replacement. Patient denies any new neurological symptoms. Nobowel or bladder incontinence, no weakness, and no falling. Review of [place]ARRS does not show any aberrant prescription behavior. Medication is helping the patient stay active. Patient denies any side effects and reportsadequate analgesia. No sign of misuse/abuse. Pt. Taking tramadol prescribed by Dr. Geneva Koch    Past Medical History:   Diagnosis Date    Asthma     CHF (congestive heart failure) ([facility] [address] 2006? [facility]    Depression     GERD (gastroesophageal reflux disease)     H/O scarlet fever     Hiatal hernia     Hypertension     Obesity     Osteoarthritis     Rheumatoid arthritis(714.0)     hands, feet, hips, knees    Sleep apnea 2011    Thyroid disease     Ulcer        Past Surgical History:   Procedure Laterality Date    CARPAL TUNNEL RELEASE      left wrist    COLONOSCOPY  03/30/2016    three sessile polyps removed; internal hemorrhoids    EYE SURGERY Right 2010    fluid extraction    FOOT SURGERY  1991    HIP SURGERY  2003    left hip    JOINT REPLACEMENT  2002?     right total knee    KNEE ARTHROPLASTY Right 1/5/2015    revision of Palliative Care follow-up  Call placed to wife Brenda to arrange time to meet to discuss goals/POC and code status moving forward. Message left requesting a return call to arrange a time to meet.      Plan: await call from wife    Thank you for allowing Palliative Care to participate in this patient's care. Please feel free to call [phone] with any questions or concerns.   arthroplasty    KNEE SURGERY  2005    right knee    OTHER SURGICAL HISTORY Right 04/09/2015    right leg quad tendon repair.     ME ESOPHAGOGASTRODUODENOSCOPY TRANSORAL DIAGNOSTIC N/A 11/15/2017    EGD ESOPHAGOGASTRODUODENOSCOPY performed by Lorne Hurley MD at [facility] ENDOSCOPY  11/15/2017    Esophagitis- LA Class A; moderate sliding hiatal hernia       Allergies   Allergen Reactions    Codeine Itching     Percocet is ok    Tape Alyx Ng Tape] Other (See Comments)     (clear adhesive tape) skin tears         Current Outpatient Medications:     DEXILANT 60 MG CPDR delayed release capsule, take 1 capsule by mouth once daily, Disp: 90 capsule, Rfl: 1    montelukast (SINGULAIR) 10 MG tablet, take 1 tablet by mouth every evening, Disp: 180 tablet, Rfl: 0    PROAIR  (90 Base) MCG/ACT inhaler, inhale 2 puffs by mouth four times a day if needed for wheezing, Disp: 25.5 g, Rfl: 1    Multiple Vitamins-Minerals (THERAPEUTIC MULTIVITAMIN-MINERALS) tablet, Take 1 tablet by mouth daily, Disp: , Rfl:     fluticasone (FLONASE) 50 MCG/ACT nasal spray, 1 spray by Nasal route daily, Disp: 1 Bottle, Rfl: 3    predniSONE (DELTASONE) 5 MG tablet, take 1 tablet by mouth BID, Disp: , Rfl: 0    albuterol (PROVENTIL) (2.5 MG/3ML) 0.083% nebulizer solution, Take 3 mLs by nebulization every 6 hours as needed for Wheezing, Disp: 120 each, Rfl: 3    folic acid (FOLVITE) 1 MG tablet, Take 1 mg by mouth daily , Disp: , Rfl: 1    methotrexate (RHEUMATREX) 2.5 MG chemo tablet, Take 15 mg by mouth once a week Indications: Takes 6 tabs (=15mg) on Wednesdays , Disp: , Rfl: 0    budesonide-formoterol (SYMBICORT) 160-4.5 MCG/ACT AERO, Inhale 2 puffs into the lungs 2 times daily, Disp: 1 Inhaler, Rfl: 1    azelastine (ASTELIN) 0.1 % nasal spray, 2 sprays by Nasal route 2 times daily Use in each nostril as directed, Disp: 1 Bottle, Rfl: 3    loratadine (CLARITIN) 10 liquor per week     Comment: weekends    Drug use: No    Sexual activity: Not on file   Lifestyle    Physical activity:     Days per week: Not on file     Minutes per session: Not on file    Stress: Not on file   Relationships    Social connections:     Talks on phone: Not on file     Gets together: Not on file     Attends [employer] service: Not on file     Active member of club or organization: Not on file     Attends meetings of clubs or organizations: Not on file     Relationship status: Not on file    Intimate partner violence:     Fear of current or ex partner: Not on file     Emotionally abused: Not on file     Physically abused: Not on file     Forced sexual activity: Not on file   Other Topics Concern    Not on file   Social History Narrative    Not on file       Review of Systems:  Review of Systems   Constitution: Positive for malaise/fatigue. HENT: Negative for sore throat. Eyes: Negative for blurred vision. Cardiovascular: Positive for dyspnea on exertion. Negative for chest pain. Respiratory: Positive for sputum production and wheezing. Skin: Negative for dry skin. Musculoskeletal: Positive for arthritis, back pain, joint pain and stiffness. Gastrointestinal: Negative for abdominal pain. Genitourinary: Negative for bladder incontinence. Neurological: Positive for dizziness, loss of balance and weakness. Psychiatric/Behavioral: Positive for depression. Physical Exam:  BP (!) 142/86   Pulse 92   Ht 5' 3\" (1.6 m)   Wt 248 lb (112.5 kg)   SpO2 97%   BMI 43.93 kg/m²     Physical Exam   Constitutional: She is oriented to person, place, and time. HENT:   Right Ear: External ear normal.   Left Ear: External ear normal.   Eyes: Conjunctivae are normal. Right eye exhibits no discharge. Left eye exhibits no discharge. Neck: No tracheal deviation present. No thyromegaly present. Pulmonary/Chest: Effort normal. No stridor. No respiratory distress.    Musculoskeletal:

## 2019-06-25 DIAGNOSIS — J30.1 CHRONIC SEASONAL ALLERGIC RHINITIS DUE TO POLLEN: ICD-10-CM

## 2019-06-25 RX ORDER — MONTELUKAST SODIUM 10 MG/1
10 TABLET ORAL DAILY
Qty: 30 TAB | Refills: 2 | Status: SHIPPED | OUTPATIENT
Start: 2019-06-25 | End: 2020-01-01

## 2019-08-06 NOTE — PROGRESS NOTES
Outcome: Left voicemail/ message    Please transfer to [facility]  [phone] when patient returns call.     [facility] Verified: yes     Attempt # 2

## 2019-10-26 NOTE — PROGRESS NOTES
Outcome: Left voicemail/ message    Please transfer to [facility]  [phone] when patient returns call.     [facility] Verified: yes     Attempt # 2

## 2020-01-01 ENCOUNTER — TELEPHONE (OUTPATIENT)
Dept: MEDICAL GROUP | Age: 85
End: 2020-01-01

## 2020-01-01 ENCOUNTER — APPOINTMENT (OUTPATIENT)
Dept: RADIOLOGY | Facility: MEDICAL CENTER | Age: 85
DRG: 871 | End: 2020-01-01
Attending: INTERNAL MEDICINE
Payer: MEDICARE

## 2020-01-01 ENCOUNTER — HOSPITAL ENCOUNTER (OUTPATIENT)
Facility: MEDICAL CENTER | Age: 85
End: 2020-10-07
Attending: PHYSICIAN ASSISTANT
Payer: MEDICARE

## 2020-01-01 ENCOUNTER — HOSPITAL ENCOUNTER (INPATIENT)
Facility: MEDICAL CENTER | Age: 85
LOS: 10 days | DRG: 871 | End: 2020-12-15
Attending: EMERGENCY MEDICINE | Admitting: HOSPITALIST
Payer: MEDICARE

## 2020-01-01 ENCOUNTER — APPOINTMENT (OUTPATIENT)
Dept: RADIOLOGY | Facility: MEDICAL CENTER | Age: 85
DRG: 871 | End: 2020-01-01
Attending: FAMILY MEDICINE
Payer: MEDICARE

## 2020-01-01 ENCOUNTER — APPOINTMENT (OUTPATIENT)
Dept: RADIOLOGY | Facility: MEDICAL CENTER | Age: 85
DRG: 871 | End: 2020-01-01
Attending: EMERGENCY MEDICINE
Payer: MEDICARE

## 2020-01-01 VITALS
BODY MASS INDEX: 24.46 KG/M2 | RESPIRATION RATE: 31 BRPM | DIASTOLIC BLOOD PRESSURE: 79 MMHG | HEART RATE: 103 BPM | OXYGEN SATURATION: 90 % | WEIGHT: 132.94 LBS | TEMPERATURE: 98.3 F | HEIGHT: 62 IN | SYSTOLIC BLOOD PRESSURE: 131 MMHG

## 2020-01-01 DIAGNOSIS — J30.1 CHRONIC SEASONAL ALLERGIC RHINITIS DUE TO POLLEN: ICD-10-CM

## 2020-01-01 DIAGNOSIS — F02.818 LATE ONSET ALZHEIMER'S DISEASE WITH BEHAVIORAL DISTURBANCE (HCC): ICD-10-CM

## 2020-01-01 DIAGNOSIS — J18.9 PNEUMONIA DUE TO INFECTIOUS ORGANISM, UNSPECIFIED LATERALITY, UNSPECIFIED PART OF LUNG: ICD-10-CM

## 2020-01-01 DIAGNOSIS — G30.1 LATE ONSET ALZHEIMER'S DISEASE WITH BEHAVIORAL DISTURBANCE (HCC): ICD-10-CM

## 2020-01-01 DIAGNOSIS — Z00.00 ANNUAL PHYSICAL EXAM: ICD-10-CM

## 2020-01-01 LAB
ACTION RANGE TRIGGERED IACRT: NO
ALBUMIN SERPL BCP-MCNC: 1.9 G/DL (ref 3.2–4.9)
ALBUMIN SERPL BCP-MCNC: 2 G/DL (ref 3.2–4.9)
ALBUMIN SERPL BCP-MCNC: 2.1 G/DL (ref 3.2–4.9)
ALBUMIN SERPL BCP-MCNC: 2.2 G/DL (ref 3.2–4.9)
ALBUMIN SERPL BCP-MCNC: 2.7 G/DL (ref 3.2–4.9)
ALBUMIN SERPL BCP-MCNC: 2.8 G/DL (ref 3.2–4.9)
ALBUMIN SERPL BCP-MCNC: 3.6 G/DL (ref 3.2–4.9)
ALBUMIN/GLOB SERPL: 0.5 G/DL
ALBUMIN/GLOB SERPL: 0.7 G/DL
ALBUMIN/GLOB SERPL: 1 G/DL
ALBUMIN/GLOB SERPL: 1.2 G/DL
ALP SERPL-CCNC: 109 U/L (ref 30–99)
ALP SERPL-CCNC: 129 U/L (ref 30–99)
ALP SERPL-CCNC: 51 U/L (ref 30–99)
ALP SERPL-CCNC: 67 U/L (ref 30–99)
ALP SERPL-CCNC: 70 U/L (ref 30–99)
ALP SERPL-CCNC: 80 U/L (ref 30–99)
ALP SERPL-CCNC: 91 U/L (ref 30–99)
ALT SERPL-CCNC: 10 U/L (ref 2–50)
ALT SERPL-CCNC: 25 U/L (ref 2–50)
ALT SERPL-CCNC: 37 U/L (ref 2–50)
ALT SERPL-CCNC: 38 U/L (ref 2–50)
ALT SERPL-CCNC: 43 U/L (ref 2–50)
ALT SERPL-CCNC: 44 U/L (ref 2–50)
ALT SERPL-CCNC: 71 U/L (ref 2–50)
ANION GAP SERPL CALC-SCNC: 10 MMOL/L (ref 7–16)
ANION GAP SERPL CALC-SCNC: 11 MMOL/L (ref 7–16)
ANION GAP SERPL CALC-SCNC: 11 MMOL/L (ref 7–16)
ANION GAP SERPL CALC-SCNC: 12 MMOL/L (ref 7–16)
ANION GAP SERPL CALC-SCNC: 13 MMOL/L (ref 7–16)
ANION GAP SERPL CALC-SCNC: 14 MMOL/L (ref 7–16)
APPEARANCE UR: ABNORMAL
AST SERPL-CCNC: 104 U/L (ref 12–45)
AST SERPL-CCNC: 16 U/L (ref 12–45)
AST SERPL-CCNC: 53 U/L (ref 12–45)
AST SERPL-CCNC: 57 U/L (ref 12–45)
AST SERPL-CCNC: 70 U/L (ref 12–45)
AST SERPL-CCNC: 84 U/L (ref 12–45)
AST SERPL-CCNC: 96 U/L (ref 12–45)
BACTERIA #/AREA URNS HPF: ABNORMAL /HPF
BACTERIA BLD CULT: ABNORMAL
BACTERIA BLD CULT: ABNORMAL
BACTERIA BLD CULT: NORMAL
BACTERIA UR CULT: NORMAL
BASE EXCESS BLDA CALC-SCNC: 6 MMOL/L (ref -4–3)
BASOPHILS # BLD AUTO: 0.2 % (ref 0–1.8)
BASOPHILS # BLD AUTO: 0.3 % (ref 0–1.8)
BASOPHILS # BLD AUTO: 0.8 % (ref 0–1.8)
BASOPHILS # BLD AUTO: 1 % (ref 0–1.8)
BASOPHILS # BLD AUTO: 1 % (ref 0–1.8)
BASOPHILS # BLD: 0.01 K/UL (ref 0–0.12)
BASOPHILS # BLD: 0.01 K/UL (ref 0–0.12)
BASOPHILS # BLD: 0.08 K/UL (ref 0–0.12)
BASOPHILS # BLD: 0.14 K/UL (ref 0–0.12)
BASOPHILS # BLD: 0.14 K/UL (ref 0–0.12)
BILIRUB SERPL-MCNC: 0.8 MG/DL (ref 0.1–1.5)
BILIRUB SERPL-MCNC: 0.9 MG/DL (ref 0.1–1.5)
BILIRUB SERPL-MCNC: 1 MG/DL (ref 0.1–1.5)
BILIRUB SERPL-MCNC: 1.1 MG/DL (ref 0.1–1.5)
BILIRUB SERPL-MCNC: 1.2 MG/DL (ref 0.1–1.5)
BILIRUB SERPL-MCNC: 1.2 MG/DL (ref 0.1–1.5)
BILIRUB SERPL-MCNC: 1.7 MG/DL (ref 0.1–1.5)
BILIRUB UR QL STRIP.AUTO: ABNORMAL
BODY TEMPERATURE: ABNORMAL DEGREES
BUN SERPL-MCNC: 17 MG/DL (ref 8–22)
BUN SERPL-MCNC: 17 MG/DL (ref 8–22)
BUN SERPL-MCNC: 26 MG/DL (ref 8–22)
BUN SERPL-MCNC: 39 MG/DL (ref 8–22)
BUN SERPL-MCNC: 40 MG/DL (ref 8–22)
BUN SERPL-MCNC: 43 MG/DL (ref 8–22)
BUN SERPL-MCNC: 43 MG/DL (ref 8–22)
BUN SERPL-MCNC: 46 MG/DL (ref 8–22)
BURR CELLS BLD QL SMEAR: NORMAL
BURR CELLS BLD QL SMEAR: NORMAL
CALCIUM SERPL-MCNC: 7.6 MG/DL (ref 8.4–10.2)
CALCIUM SERPL-MCNC: 8 MG/DL (ref 8.4–10.2)
CALCIUM SERPL-MCNC: 8 MG/DL (ref 8.4–10.2)
CALCIUM SERPL-MCNC: 8.1 MG/DL (ref 8.4–10.2)
CALCIUM SERPL-MCNC: 8.1 MG/DL (ref 8.4–10.2)
CALCIUM SERPL-MCNC: 8.3 MG/DL (ref 8.4–10.2)
CALCIUM SERPL-MCNC: 8.7 MG/DL (ref 8.4–10.2)
CALCIUM SERPL-MCNC: 8.9 MG/DL (ref 8.4–10.2)
CHLORIDE SERPL-SCNC: 104 MMOL/L (ref 96–112)
CHLORIDE SERPL-SCNC: 107 MMOL/L (ref 96–112)
CHLORIDE SERPL-SCNC: 108 MMOL/L (ref 96–112)
CHLORIDE SERPL-SCNC: 118 MMOL/L (ref 96–112)
CHLORIDE SERPL-SCNC: 124 MMOL/L (ref 96–112)
CHLORIDE SERPL-SCNC: 126 MMOL/L (ref 96–112)
CHLORIDE SERPL-SCNC: 129 MMOL/L (ref 96–112)
CHLORIDE SERPL-SCNC: 99 MMOL/L (ref 96–112)
CO2 BLDA-SCNC: 33 MMOL/L (ref 20–33)
CO2 SERPL-SCNC: 17 MMOL/L (ref 20–33)
CO2 SERPL-SCNC: 19 MMOL/L (ref 20–33)
CO2 SERPL-SCNC: 20 MMOL/L (ref 20–33)
CO2 SERPL-SCNC: 21 MMOL/L (ref 20–33)
CO2 SERPL-SCNC: 22 MMOL/L (ref 20–33)
CO2 SERPL-SCNC: 23 MMOL/L (ref 20–33)
COLOR UR: YELLOW
COVID ORDER STATUS COVID19: NORMAL
CREAT SERPL-MCNC: 0.77 MG/DL (ref 0.5–1.4)
CREAT SERPL-MCNC: 0.88 MG/DL (ref 0.5–1.4)
CREAT SERPL-MCNC: 1.06 MG/DL (ref 0.5–1.4)
CREAT SERPL-MCNC: 1.08 MG/DL (ref 0.5–1.4)
CREAT SERPL-MCNC: 1.21 MG/DL (ref 0.5–1.4)
CREAT SERPL-MCNC: 1.23 MG/DL (ref 0.5–1.4)
CREAT SERPL-MCNC: 1.37 MG/DL (ref 0.5–1.4)
CREAT SERPL-MCNC: 1.47 MG/DL (ref 0.5–1.4)
CRP SERPL HS-MCNC: 4.04 MG/DL (ref 0–0.75)
D DIMER PPP IA.FEU-MCNC: 1.59 UG/ML (FEU) (ref 0–0.5)
EKG IMPRESSION: NORMAL
EKG IMPRESSION: NORMAL
EOSINOPHIL # BLD AUTO: 0 K/UL (ref 0–0.51)
EOSINOPHIL # BLD AUTO: 0.08 K/UL (ref 0–0.51)
EOSINOPHIL # BLD AUTO: 0.14 K/UL (ref 0–0.51)
EOSINOPHIL NFR BLD: 0 % (ref 0–6.9)
EOSINOPHIL NFR BLD: 1 % (ref 0–6.9)
EOSINOPHIL NFR BLD: 1 % (ref 0–6.9)
EPI CELLS #/AREA URNS HPF: ABNORMAL /HPF
ERYTHROCYTE [DISTWIDTH] IN BLOOD BY AUTOMATED COUNT: 45.4 FL (ref 35.9–50)
ERYTHROCYTE [DISTWIDTH] IN BLOOD BY AUTOMATED COUNT: 45.8 FL (ref 35.9–50)
ERYTHROCYTE [DISTWIDTH] IN BLOOD BY AUTOMATED COUNT: 48 FL (ref 35.9–50)
ERYTHROCYTE [DISTWIDTH] IN BLOOD BY AUTOMATED COUNT: 50.4 FL (ref 35.9–50)
ERYTHROCYTE [DISTWIDTH] IN BLOOD BY AUTOMATED COUNT: 51.5 FL (ref 35.9–50)
ERYTHROCYTE [DISTWIDTH] IN BLOOD BY AUTOMATED COUNT: 52 FL (ref 35.9–50)
FLUAV RNA SPEC QL NAA+PROBE: NEGATIVE
FLUBV RNA SPEC QL NAA+PROBE: NEGATIVE
GAMMA INTERFERON BACKGROUND BLD IA-ACNC: 0.05 IU/ML
GLOBULIN SER CALC-MCNC: 2.8 G/DL (ref 1.9–3.5)
GLOBULIN SER CALC-MCNC: 2.9 G/DL (ref 1.9–3.5)
GLOBULIN SER CALC-MCNC: 2.9 G/DL (ref 1.9–3.5)
GLOBULIN SER CALC-MCNC: 3.1 G/DL (ref 1.9–3.5)
GLOBULIN SER CALC-MCNC: 3.3 G/DL (ref 1.9–3.5)
GLOBULIN SER CALC-MCNC: 3.6 G/DL (ref 1.9–3.5)
GLOBULIN SER CALC-MCNC: 3.8 G/DL (ref 1.9–3.5)
GLUCOSE BLD-MCNC: 122 MG/DL (ref 65–99)
GLUCOSE SERPL-MCNC: 105 MG/DL (ref 65–99)
GLUCOSE SERPL-MCNC: 114 MG/DL (ref 65–99)
GLUCOSE SERPL-MCNC: 115 MG/DL (ref 65–99)
GLUCOSE SERPL-MCNC: 115 MG/DL (ref 65–99)
GLUCOSE SERPL-MCNC: 120 MG/DL (ref 65–99)
GLUCOSE SERPL-MCNC: 124 MG/DL (ref 65–99)
GLUCOSE SERPL-MCNC: 94 MG/DL (ref 65–99)
GLUCOSE SERPL-MCNC: 97 MG/DL (ref 65–99)
GLUCOSE UR STRIP.AUTO-MCNC: NEGATIVE MG/DL
GRAN CASTS #/AREA URNS LPF: ABNORMAL /LPF
HCO3 BLDA-SCNC: 31.2 MMOL/L (ref 17–25)
HCT VFR BLD AUTO: 30.3 % (ref 42–52)
HCT VFR BLD AUTO: 33.1 % (ref 42–52)
HCT VFR BLD AUTO: 35.3 % (ref 42–52)
HCT VFR BLD AUTO: 37.4 % (ref 42–52)
HCT VFR BLD AUTO: 37.6 % (ref 42–52)
HCT VFR BLD AUTO: 46.9 % (ref 42–52)
HGB BLD-MCNC: 10.6 G/DL (ref 14–18)
HGB BLD-MCNC: 11.8 G/DL (ref 14–18)
HGB BLD-MCNC: 12.4 G/DL (ref 14–18)
HGB BLD-MCNC: 12.5 G/DL (ref 14–18)
HGB BLD-MCNC: 14.6 G/DL (ref 14–18)
HGB BLD-MCNC: 9.8 G/DL (ref 14–18)
HOROWITZ INDEX BLDA+IHG-RTO: 122 MM[HG]
IL6 SERPL-MCNC: 10.8 PG/ML
IMM GRANULOCYTES # BLD AUTO: 0.03 K/UL (ref 0–0.11)
IMM GRANULOCYTES # BLD AUTO: 0.08 K/UL (ref 0–0.11)
IMM GRANULOCYTES # BLD AUTO: 0.89 K/UL (ref 0–0.11)
IMM GRANULOCYTES NFR BLD AUTO: 0.9 % (ref 0–0.9)
IMM GRANULOCYTES NFR BLD AUTO: 1.3 % (ref 0–0.9)
IMM GRANULOCYTES NFR BLD AUTO: 5.1 % (ref 0–0.9)
INST. QUALIFIED PATIENT IIQPT: YES
KETONES UR STRIP.AUTO-MCNC: ABNORMAL MG/DL
LACTATE BLD-SCNC: 1.3 MMOL/L (ref 0.5–2)
LACTATE BLD-SCNC: 1.8 MMOL/L (ref 0.5–2)
LACTATE BLD-SCNC: 2 MMOL/L (ref 0.5–2)
LACTATE BLD-SCNC: 2 MMOL/L (ref 0.5–2)
LACTATE BLD-SCNC: 2.1 MMOL/L (ref 0.5–2)
LACTATE BLD-SCNC: 2.6 MMOL/L (ref 0.5–2)
LACTATE BLD-SCNC: 3.2 MMOL/L (ref 0.5–2)
LACTATE BLD-SCNC: 8.5 MMOL/L (ref 0.5–2)
LEUKOCYTE ESTERASE UR QL STRIP.AUTO: NEGATIVE
LYMPHOCYTES # BLD AUTO: 0.14 K/UL (ref 1–4.8)
LYMPHOCYTES # BLD AUTO: 0.25 K/UL (ref 1–4.8)
LYMPHOCYTES # BLD AUTO: 0.32 K/UL (ref 1–4.8)
LYMPHOCYTES # BLD AUTO: 0.39 K/UL (ref 1–4.8)
LYMPHOCYTES # BLD AUTO: 0.75 K/UL (ref 1–4.8)
LYMPHOCYTES NFR BLD: 1 % (ref 22–41)
LYMPHOCYTES NFR BLD: 4.3 % (ref 22–41)
LYMPHOCYTES NFR BLD: 5 % (ref 22–41)
LYMPHOCYTES NFR BLD: 5 % (ref 22–41)
LYMPHOCYTES NFR BLD: 7.8 % (ref 22–41)
M TB IFN-G BLD-IMP: NEGATIVE
M TB IFN-G CD4+ BCKGRND COR BLD-ACNC: 0.01 IU/ML
MACROCYTES BLD QL SMEAR: ABNORMAL
MACROCYTES BLD QL SMEAR: ABNORMAL
MANUAL DIFF BLD: ABNORMAL
MANUAL DIFF BLD: ABNORMAL
MCH RBC QN AUTO: 32.7 PG (ref 27–33)
MCH RBC QN AUTO: 32.7 PG (ref 27–33)
MCH RBC QN AUTO: 32.8 PG (ref 27–33)
MCH RBC QN AUTO: 32.9 PG (ref 27–33)
MCH RBC QN AUTO: 33.9 PG (ref 27–33)
MCH RBC QN AUTO: 34.8 PG (ref 27–33)
MCHC RBC AUTO-ENTMCNC: 31.1 G/DL (ref 33.7–35.3)
MCHC RBC AUTO-ENTMCNC: 32 G/DL (ref 33.7–35.3)
MCHC RBC AUTO-ENTMCNC: 32.9 G/DL (ref 33.7–35.3)
MCHC RBC AUTO-ENTMCNC: 33.2 G/DL (ref 33.7–35.3)
MCHC RBC AUTO-ENTMCNC: 33.2 G/DL (ref 33.7–35.3)
MCHC RBC AUTO-ENTMCNC: 33.4 G/DL (ref 33.7–35.3)
MCV RBC AUTO: 102.2 FL (ref 81.4–97.8)
MCV RBC AUTO: 102.8 FL (ref 81.4–97.8)
MCV RBC AUTO: 105.2 FL (ref 81.4–97.8)
MCV RBC AUTO: 105.7 FL (ref 81.4–97.8)
MCV RBC AUTO: 98.1 FL (ref 81.4–97.8)
MCV RBC AUTO: 98.4 FL (ref 81.4–97.8)
METAMYELOCYTES NFR BLD MANUAL: 4 %
METAMYELOCYTES NFR BLD MANUAL: 4 %
MICRO URNS: ABNORMAL
MITOGEN IGNF BCKGRD COR BLD-ACNC: >10 IU/ML
MONOCYTES # BLD AUTO: 0.16 K/UL (ref 0–0.85)
MONOCYTES # BLD AUTO: 0.28 K/UL (ref 0–0.85)
MONOCYTES # BLD AUTO: 0.29 K/UL (ref 0–0.85)
MONOCYTES # BLD AUTO: 0.33 K/UL (ref 0–0.85)
MONOCYTES # BLD AUTO: 0.5 K/UL (ref 0–0.85)
MONOCYTES NFR BLD AUTO: 10.3 % (ref 0–13.4)
MONOCYTES NFR BLD AUTO: 2 % (ref 0–13.4)
MONOCYTES NFR BLD AUTO: 2 % (ref 0–13.4)
MONOCYTES NFR BLD AUTO: 2.9 % (ref 0–13.4)
MONOCYTES NFR BLD AUTO: 4.5 % (ref 0–13.4)
MRSA DNA SPEC QL NAA+PROBE: NORMAL
MUCOUS THREADS #/AREA URNS HPF: ABNORMAL /HPF
NEUTROPHILS # BLD AUTO: 12.56 K/UL (ref 1.82–7.42)
NEUTROPHILS # BLD AUTO: 15.26 K/UL (ref 1.82–7.42)
NEUTROPHILS # BLD AUTO: 2.59 K/UL (ref 1.82–7.42)
NEUTROPHILS # BLD AUTO: 5.7 K/UL (ref 1.82–7.42)
NEUTROPHILS # BLD AUTO: 6.79 K/UL (ref 1.82–7.42)
NEUTROPHILS NFR BLD: 72 % (ref 44–72)
NEUTROPHILS NFR BLD: 77 % (ref 44–72)
NEUTROPHILS NFR BLD: 80.7 % (ref 44–72)
NEUTROPHILS NFR BLD: 86.9 % (ref 44–72)
NEUTROPHILS NFR BLD: 89 % (ref 44–72)
NEUTS BAND NFR BLD MANUAL: 14 % (ref 0–10)
NEUTS BAND NFR BLD MANUAL: 15 % (ref 0–10)
NITRITE UR QL STRIP.AUTO: NEGATIVE
NRBC # BLD AUTO: 0 K/UL
NRBC # BLD AUTO: 0 K/UL
NRBC # BLD AUTO: 0.02 K/UL
NRBC BLD-RTO: 0 /100 WBC
NRBC BLD-RTO: 0 /100 WBC
NRBC BLD-RTO: 0.1 /100 WBC
NRBC BLD-RTO: 0.1 /100 WBC
NRBC BLD-RTO: 0.3 /100 WBC
NT-PROBNP SERPL IA-MCNC: 668 PG/ML (ref 0–125)
O2/TOTAL GAS SETTING VFR VENT: 85 %
OVALOCYTES BLD QL SMEAR: NORMAL
OVALOCYTES BLD QL SMEAR: NORMAL
PCO2 BLDA: 48.2 MMHG (ref 26–37)
PH BLDA: 7.42 [PH] (ref 7.4–7.5)
PH UR STRIP.AUTO: 6 [PH] (ref 5–8)
PLATELET # BLD AUTO: 104 K/UL (ref 164–446)
PLATELET # BLD AUTO: 106 K/UL (ref 164–446)
PLATELET # BLD AUTO: 140 K/UL (ref 164–446)
PLATELET # BLD AUTO: 185 K/UL (ref 164–446)
PLATELET # BLD AUTO: 196 K/UL (ref 164–446)
PLATELET # BLD AUTO: 199 K/UL (ref 164–446)
PLATELET BLD QL SMEAR: NORMAL
PLATELET BLD QL SMEAR: NORMAL
PMV BLD AUTO: 10.5 FL (ref 9–12.9)
PMV BLD AUTO: 10.7 FL (ref 9–12.9)
PMV BLD AUTO: 10.8 FL (ref 9–12.9)
PMV BLD AUTO: 10.9 FL (ref 9–12.9)
PMV BLD AUTO: 11.4 FL (ref 9–12.9)
PMV BLD AUTO: 11.5 FL (ref 9–12.9)
PO2 BLDA: 104 MMHG (ref 64–87)
POIKILOCYTOSIS BLD QL SMEAR: NORMAL
POIKILOCYTOSIS BLD QL SMEAR: NORMAL
POTASSIUM SERPL-SCNC: 3.4 MMOL/L (ref 3.6–5.5)
POTASSIUM SERPL-SCNC: 3.5 MMOL/L (ref 3.6–5.5)
POTASSIUM SERPL-SCNC: 3.7 MMOL/L (ref 3.6–5.5)
POTASSIUM SERPL-SCNC: 3.7 MMOL/L (ref 3.6–5.5)
POTASSIUM SERPL-SCNC: 3.8 MMOL/L (ref 3.6–5.5)
POTASSIUM SERPL-SCNC: 4.1 MMOL/L (ref 3.6–5.5)
POTASSIUM SERPL-SCNC: 4.2 MMOL/L (ref 3.6–5.5)
POTASSIUM SERPL-SCNC: 4.3 MMOL/L (ref 3.6–5.5)
PROCALCITONIN SERPL-MCNC: 0.12 NG/ML
PROCALCITONIN SERPL-MCNC: 0.71 NG/ML
PROT SERPL-MCNC: 4.9 G/DL (ref 6–8.2)
PROT SERPL-MCNC: 5.2 G/DL (ref 6–8.2)
PROT SERPL-MCNC: 5.5 G/DL (ref 6–8.2)
PROT SERPL-MCNC: 5.5 G/DL (ref 6–8.2)
PROT SERPL-MCNC: 5.6 G/DL (ref 6–8.2)
PROT SERPL-MCNC: 6.5 G/DL (ref 6–8.2)
PROT SERPL-MCNC: 6.5 G/DL (ref 6–8.2)
PROT UR QL STRIP: 100 MG/DL
QFT TB2 - NIL TBQ2: 0 IU/ML
RBC # BLD AUTO: 2.82 M/UL (ref 4.7–6.1)
RBC # BLD AUTO: 3.22 M/UL (ref 4.7–6.1)
RBC # BLD AUTO: 3.6 M/UL (ref 4.7–6.1)
RBC # BLD AUTO: 3.66 M/UL (ref 4.7–6.1)
RBC # BLD AUTO: 3.82 M/UL (ref 4.7–6.1)
RBC # BLD AUTO: 4.46 M/UL (ref 4.7–6.1)
RBC # URNS HPF: ABNORMAL /HPF
RBC BLD AUTO: PRESENT
RBC BLD AUTO: PRESENT
RBC UR QL AUTO: ABNORMAL
RSV RNA SPEC QL NAA+PROBE: NEGATIVE
SAO2 % BLDA: 98 % (ref 93–99)
SARS-COV-2 RNA RESP QL NAA+PROBE: DETECTED
SIGNIFICANT IND 70042: ABNORMAL
SIGNIFICANT IND 70042: NORMAL
SITE SITE: ABNORMAL
SITE SITE: NORMAL
SODIUM SERPL-SCNC: 132 MMOL/L (ref 135–145)
SODIUM SERPL-SCNC: 134 MMOL/L (ref 135–145)
SODIUM SERPL-SCNC: 137 MMOL/L (ref 135–145)
SODIUM SERPL-SCNC: 142 MMOL/L (ref 135–145)
SODIUM SERPL-SCNC: 149 MMOL/L (ref 135–145)
SODIUM SERPL-SCNC: 156 MMOL/L (ref 135–145)
SODIUM SERPL-SCNC: 157 MMOL/L (ref 135–145)
SODIUM SERPL-SCNC: 161 MMOL/L (ref 135–145)
SOURCE SOURCE: ABNORMAL
SOURCE SOURCE: NORMAL
SP GR UR STRIP.AUTO: 1.02
SPECIMEN DRAWN FROM PATIENT: ABNORMAL
SPECIMEN SOURCE: ABNORMAL
TROPONIN T SERPL-MCNC: 20 NG/L (ref 6–19)
UNIDENT CRYS URNS QL MICRO: ABNORMAL /HPF
WBC # BLD AUTO: 13.8 K/UL (ref 4.8–10.8)
WBC # BLD AUTO: 17.5 K/UL (ref 4.8–10.8)
WBC # BLD AUTO: 3.2 K/UL (ref 4.8–10.8)
WBC # BLD AUTO: 3.6 K/UL (ref 4.8–10.8)
WBC # BLD AUTO: 6.4 K/UL (ref 4.8–10.8)
WBC # BLD AUTO: 7.8 K/UL (ref 4.8–10.8)
WBC #/AREA URNS HPF: ABNORMAL /HPF

## 2020-01-01 PROCEDURE — 99232 SBSQ HOSP IP/OBS MODERATE 35: CPT | Performed by: FAMILY MEDICINE

## 2020-01-01 PROCEDURE — 99291 CRITICAL CARE FIRST HOUR: CPT | Performed by: INTERNAL MEDICINE

## 2020-01-01 PROCEDURE — 87186 SC STD MICRODIL/AGAR DIL: CPT

## 2020-01-01 PROCEDURE — 700102 HCHG RX REV CODE 250 W/ 637 OVERRIDE(OP): Performed by: HOSPITALIST

## 2020-01-01 PROCEDURE — 770020 HCHG ROOM/CARE - TELE (206)

## 2020-01-01 PROCEDURE — 87040 BLOOD CULTURE FOR BACTERIA: CPT

## 2020-01-01 PROCEDURE — 700111 HCHG RX REV CODE 636 W/ 250 OVERRIDE (IP): Performed by: EMERGENCY MEDICINE

## 2020-01-01 PROCEDURE — A9270 NON-COVERED ITEM OR SERVICE: HCPCS | Performed by: FAMILY MEDICINE

## 2020-01-01 PROCEDURE — C9803 HOPD COVID-19 SPEC COLLECT: HCPCS | Performed by: EMERGENCY MEDICINE

## 2020-01-01 PROCEDURE — 80048 BASIC METABOLIC PNL TOTAL CA: CPT

## 2020-01-01 PROCEDURE — 700105 HCHG RX REV CODE 258: Performed by: HOSPITALIST

## 2020-01-01 PROCEDURE — 700105 HCHG RX REV CODE 258: Performed by: FAMILY MEDICINE

## 2020-01-01 PROCEDURE — 700111 HCHG RX REV CODE 636 W/ 250 OVERRIDE (IP): Performed by: FAMILY MEDICINE

## 2020-01-01 PROCEDURE — 80053 COMPREHEN METABOLIC PANEL: CPT

## 2020-01-01 PROCEDURE — 99231 SBSQ HOSP IP/OBS SF/LOW 25: CPT | Performed by: FAMILY MEDICINE

## 2020-01-01 PROCEDURE — 71045 X-RAY EXAM CHEST 1 VIEW: CPT

## 2020-01-01 PROCEDURE — 83880 ASSAY OF NATRIURETIC PEPTIDE: CPT

## 2020-01-01 PROCEDURE — 700101 HCHG RX REV CODE 250: Performed by: HOSPITALIST

## 2020-01-01 PROCEDURE — 85027 COMPLETE CBC AUTOMATED: CPT

## 2020-01-01 PROCEDURE — 85007 BL SMEAR W/DIFF WBC COUNT: CPT

## 2020-01-01 PROCEDURE — 85025 COMPLETE CBC W/AUTO DIFF WBC: CPT

## 2020-01-01 PROCEDURE — 87641 MR-STAPH DNA AMP PROBE: CPT

## 2020-01-01 PROCEDURE — 94640 AIRWAY INHALATION TREATMENT: CPT

## 2020-01-01 PROCEDURE — 700102 HCHG RX REV CODE 250 W/ 637 OVERRIDE(OP): Performed by: FAMILY MEDICINE

## 2020-01-01 PROCEDURE — 82962 GLUCOSE BLOOD TEST: CPT

## 2020-01-01 PROCEDURE — 93005 ELECTROCARDIOGRAM TRACING: CPT | Performed by: EMERGENCY MEDICINE

## 2020-01-01 PROCEDURE — 700111 HCHG RX REV CODE 636 W/ 250 OVERRIDE (IP): Performed by: HOSPITALIST

## 2020-01-01 PROCEDURE — 86140 C-REACTIVE PROTEIN: CPT

## 2020-01-01 PROCEDURE — A9270 NON-COVERED ITEM OR SERVICE: HCPCS | Performed by: STUDENT IN AN ORGANIZED HEALTH CARE EDUCATION/TRAINING PROGRAM

## 2020-01-01 PROCEDURE — 96365 THER/PROPH/DIAG IV INF INIT: CPT

## 2020-01-01 PROCEDURE — 93005 ELECTROCARDIOGRAM TRACING: CPT | Performed by: HOSPITALIST

## 2020-01-01 PROCEDURE — 770022 HCHG ROOM/CARE - ICU (200)

## 2020-01-01 PROCEDURE — 94760 N-INVAS EAR/PLS OXIMETRY 1: CPT

## 2020-01-01 PROCEDURE — A9270 NON-COVERED ITEM OR SERVICE: HCPCS | Performed by: HOSPITALIST

## 2020-01-01 PROCEDURE — 84145 PROCALCITONIN (PCT): CPT

## 2020-01-01 PROCEDURE — 83605 ASSAY OF LACTIC ACID: CPT

## 2020-01-01 PROCEDURE — 87077 CULTURE AEROBIC IDENTIFY: CPT

## 2020-01-01 PROCEDURE — 700105 HCHG RX REV CODE 258: Performed by: EMERGENCY MEDICINE

## 2020-01-01 PROCEDURE — 87086 URINE CULTURE/COLONY COUNT: CPT

## 2020-01-01 PROCEDURE — 700101 HCHG RX REV CODE 250

## 2020-01-01 PROCEDURE — 700111 HCHG RX REV CODE 636 W/ 250 OVERRIDE (IP): Performed by: STUDENT IN AN ORGANIZED HEALTH CARE EDUCATION/TRAINING PROGRAM

## 2020-01-01 PROCEDURE — 700105 HCHG RX REV CODE 258: Performed by: INTERNAL MEDICINE

## 2020-01-01 PROCEDURE — 99233 SBSQ HOSP IP/OBS HIGH 50: CPT | Performed by: FAMILY MEDICINE

## 2020-01-01 PROCEDURE — 83520 IMMUNOASSAY QUANT NOS NONAB: CPT

## 2020-01-01 PROCEDURE — 84484 ASSAY OF TROPONIN QUANT: CPT

## 2020-01-01 PROCEDURE — 87150 DNA/RNA AMPLIFIED PROBE: CPT

## 2020-01-01 PROCEDURE — 82803 BLOOD GASES ANY COMBINATION: CPT

## 2020-01-01 PROCEDURE — 86480 TB TEST CELL IMMUN MEASURE: CPT

## 2020-01-01 PROCEDURE — 99223 1ST HOSP IP/OBS HIGH 75: CPT | Mod: AI | Performed by: HOSPITALIST

## 2020-01-01 PROCEDURE — 5A0945A ASSISTANCE WITH RESPIRATORY VENTILATION, 24-96 CONSECUTIVE HOURS, HIGH NASAL FLOW/VELOCITY: ICD-10-PCS | Performed by: INTERNAL MEDICINE

## 2020-01-01 PROCEDURE — 99233 SBSQ HOSP IP/OBS HIGH 50: CPT | Performed by: INTERNAL MEDICINE

## 2020-01-01 PROCEDURE — 81001 URINALYSIS AUTO W/SCOPE: CPT

## 2020-01-01 PROCEDURE — 700101 HCHG RX REV CODE 250: Performed by: INTERNAL MEDICINE

## 2020-01-01 PROCEDURE — 83605 ASSAY OF LACTIC ACID: CPT | Mod: 91

## 2020-01-01 PROCEDURE — 700102 HCHG RX REV CODE 250 W/ 637 OVERRIDE(OP): Performed by: STUDENT IN AN ORGANIZED HEALTH CARE EDUCATION/TRAINING PROGRAM

## 2020-01-01 PROCEDURE — 93010 ELECTROCARDIOGRAM REPORT: CPT | Performed by: INTERNAL MEDICINE

## 2020-01-01 PROCEDURE — 0241U HCHG SARS-COV-2 COVID-19 NFCT DS RESP RNA 4 TRGT MIC: CPT

## 2020-01-01 PROCEDURE — 85379 FIBRIN DEGRADATION QUANT: CPT

## 2020-01-01 PROCEDURE — XW033E5 INTRODUCTION OF REMDESIVIR ANTI-INFECTIVE INTO PERIPHERAL VEIN, PERCUTANEOUS APPROACH, NEW TECHNOLOGY GROUP 5: ICD-10-PCS | Performed by: INTERNAL MEDICINE

## 2020-01-01 PROCEDURE — 99285 EMERGENCY DEPT VISIT HI MDM: CPT

## 2020-01-01 RX ORDER — BISACODYL 10 MG
10 SUPPOSITORY, RECTAL RECTAL
Status: DISCONTINUED | OUTPATIENT
Start: 2020-01-01 | End: 2020-01-01 | Stop reason: HOSPADM

## 2020-01-01 RX ORDER — SIMVASTATIN 10 MG
TABLET ORAL
Qty: 90 TAB | Refills: 0 | Status: SHIPPED | OUTPATIENT
Start: 2020-01-01 | End: 2020-01-01

## 2020-01-01 RX ORDER — TAMSULOSIN HYDROCHLORIDE 0.4 MG/1
0.4 CAPSULE ORAL
COMMUNITY

## 2020-01-01 RX ORDER — DEXAMETHASONE 6 MG/1
6 TABLET ORAL DAILY
Qty: 7 TAB | Refills: 0 | Status: SHIPPED | OUTPATIENT
Start: 2020-01-01

## 2020-01-01 RX ORDER — ONDANSETRON 2 MG/ML
4 INJECTION INTRAMUSCULAR; INTRAVENOUS EVERY 6 HOURS PRN
Status: DISCONTINUED | OUTPATIENT
Start: 2020-01-01 | End: 2020-01-01

## 2020-01-01 RX ORDER — SODIUM CHLORIDE 9 MG/ML
1000 INJECTION, SOLUTION INTRAVENOUS ONCE
Status: COMPLETED | OUTPATIENT
Start: 2020-01-01 | End: 2020-01-01

## 2020-01-01 RX ORDER — ONDANSETRON 4 MG/1
4 TABLET, ORALLY DISINTEGRATING ORAL EVERY 6 HOURS PRN
Status: DISCONTINUED | OUTPATIENT
Start: 2020-01-01 | End: 2020-01-01

## 2020-01-01 RX ORDER — MONTELUKAST SODIUM 10 MG/1
10 TABLET ORAL DAILY
Status: DISCONTINUED | OUTPATIENT
Start: 2020-01-01 | End: 2020-01-01 | Stop reason: HOSPADM

## 2020-01-01 RX ORDER — DONEPEZIL HYDROCHLORIDE 10 MG/1
10 TABLET, FILM COATED ORAL
Qty: 90 TAB | Refills: 0 | Status: SHIPPED | OUTPATIENT
Start: 2020-01-01 | End: 2020-01-01

## 2020-01-01 RX ORDER — ONDANSETRON 4 MG/1
4 TABLET, ORALLY DISINTEGRATING ORAL EVERY 4 HOURS PRN
Status: DISCONTINUED | OUTPATIENT
Start: 2020-01-01 | End: 2020-01-01 | Stop reason: HOSPADM

## 2020-01-01 RX ORDER — MEMANTINE HYDROCHLORIDE 10 MG/1
TABLET ORAL
Qty: 90 TAB | Refills: 0 | Status: SHIPPED | OUTPATIENT
Start: 2020-01-01 | End: 2020-01-01

## 2020-01-01 RX ORDER — MORPHINE SULFATE 100 MG/5ML
10 SOLUTION ORAL
Status: DISCONTINUED | OUTPATIENT
Start: 2020-01-01 | End: 2020-01-01 | Stop reason: HOSPADM

## 2020-01-01 RX ORDER — SIMVASTATIN 10 MG
TABLET ORAL
Qty: 30 TAB | Refills: 0 | Status: SHIPPED | OUTPATIENT
Start: 2020-01-01 | End: 2020-01-01 | Stop reason: SDUPTHER

## 2020-01-01 RX ORDER — ARIPIPRAZOLE 2 MG/1
TABLET ORAL
Qty: 90 TAB | Refills: 0 | Status: SHIPPED | OUTPATIENT
Start: 2020-01-01 | End: 2020-01-01

## 2020-01-01 RX ORDER — AMOXICILLIN 250 MG
2 CAPSULE ORAL 2 TIMES DAILY
Status: DISCONTINUED | OUTPATIENT
Start: 2020-01-01 | End: 2020-01-01 | Stop reason: HOSPADM

## 2020-01-01 RX ORDER — MIDODRINE HYDROCHLORIDE 5 MG/1
5 TABLET ORAL
Status: DISCONTINUED | OUTPATIENT
Start: 2020-01-01 | End: 2020-01-01

## 2020-01-01 RX ORDER — MEMANTINE HYDROCHLORIDE 10 MG/1
TABLET ORAL
Qty: 60 TAB | Refills: 0 | Status: SHIPPED | OUTPATIENT
Start: 2020-01-01

## 2020-01-01 RX ORDER — SODIUM CHLORIDE 9 MG/ML
INJECTION, SOLUTION INTRAVENOUS ONCE
Status: ACTIVE | OUTPATIENT
Start: 2020-01-01 | End: 2020-01-01

## 2020-01-01 RX ORDER — METOPROLOL TARTRATE 1 MG/ML
INJECTION, SOLUTION INTRAVENOUS
Status: COMPLETED
Start: 2020-01-01 | End: 2020-01-01

## 2020-01-01 RX ORDER — ACETAMINOPHEN 325 MG/1
650 TABLET ORAL ONCE
Status: ACTIVE | OUTPATIENT
Start: 2020-01-01 | End: 2020-01-01

## 2020-01-01 RX ORDER — SODIUM CHLORIDE 9 MG/ML
500 INJECTION, SOLUTION INTRAVENOUS ONCE
Status: COMPLETED | OUTPATIENT
Start: 2020-01-01 | End: 2020-01-01

## 2020-01-01 RX ORDER — ESCITALOPRAM OXALATE 10 MG/1
20 TABLET ORAL DAILY
Status: DISCONTINUED | OUTPATIENT
Start: 2020-01-01 | End: 2020-01-01 | Stop reason: HOSPADM

## 2020-01-01 RX ORDER — LORAZEPAM 2 MG/ML
2 INJECTION INTRAMUSCULAR
Status: DISCONTINUED | OUTPATIENT
Start: 2020-01-01 | End: 2020-01-01 | Stop reason: HOSPADM

## 2020-01-01 RX ORDER — MORPHINE SULFATE 4 MG/ML
4 INJECTION, SOLUTION INTRAMUSCULAR; INTRAVENOUS
Status: DISCONTINUED | OUTPATIENT
Start: 2020-01-01 | End: 2020-01-01 | Stop reason: HOSPADM

## 2020-01-01 RX ORDER — GUAIFENESIN/DEXTROMETHORPHAN 100-10MG/5
10 SYRUP ORAL EVERY 6 HOURS PRN
Status: DISCONTINUED | OUTPATIENT
Start: 2020-01-01 | End: 2020-01-01 | Stop reason: HOSPADM

## 2020-01-01 RX ORDER — MORPHINE SULFATE 4 MG/ML
4 INJECTION, SOLUTION INTRAMUSCULAR; INTRAVENOUS
Status: DISCONTINUED | OUTPATIENT
Start: 2020-01-01 | End: 2020-01-01

## 2020-01-01 RX ORDER — DONEPEZIL HYDROCHLORIDE 5 MG/1
10 TABLET, FILM COATED ORAL NIGHTLY
Status: DISCONTINUED | OUTPATIENT
Start: 2020-01-01 | End: 2020-01-01 | Stop reason: HOSPADM

## 2020-01-01 RX ORDER — ACETAMINOPHEN 325 MG/1
650 TABLET ORAL EVERY 6 HOURS PRN
Status: DISCONTINUED | OUTPATIENT
Start: 2020-01-01 | End: 2020-01-01 | Stop reason: HOSPADM

## 2020-01-01 RX ORDER — SODIUM CHLORIDE 9 MG/ML
500 INJECTION, SOLUTION INTRAVENOUS ONCE
Status: DISCONTINUED | OUTPATIENT
Start: 2020-01-01 | End: 2020-01-01

## 2020-01-01 RX ORDER — ARIPIPRAZOLE 2 MG/1
2 TABLET ORAL DAILY
Status: DISCONTINUED | OUTPATIENT
Start: 2020-01-01 | End: 2020-01-01 | Stop reason: HOSPADM

## 2020-01-01 RX ORDER — POLYETHYLENE GLYCOL 3350 17 G/17G
1 POWDER, FOR SOLUTION ORAL
Status: DISCONTINUED | OUTPATIENT
Start: 2020-01-01 | End: 2020-01-01 | Stop reason: HOSPADM

## 2020-01-01 RX ORDER — MONTELUKAST SODIUM 10 MG/1
10 TABLET ORAL DAILY
Qty: 30 TAB | Refills: 0 | Status: SHIPPED | OUTPATIENT
Start: 2020-01-01

## 2020-01-01 RX ORDER — ADENOSINE 3 MG/ML
6 INJECTION, SOLUTION INTRAVENOUS ONCE
Status: ACTIVE | OUTPATIENT
Start: 2020-01-01 | End: 2020-01-01

## 2020-01-01 RX ORDER — ATROPINE SULFATE 10 MG/ML
2 SOLUTION/ DROPS OPHTHALMIC EVERY 4 HOURS PRN
Status: DISCONTINUED | OUTPATIENT
Start: 2020-01-01 | End: 2020-01-01 | Stop reason: HOSPADM

## 2020-01-01 RX ORDER — ARIPIPRAZOLE 2 MG/1
TABLET ORAL
Qty: 18 TAB | Refills: 0 | Status: SHIPPED | OUTPATIENT
Start: 2020-01-01

## 2020-01-01 RX ORDER — DEXAMETHASONE SODIUM PHOSPHATE 4 MG/ML
6 INJECTION, SOLUTION INTRA-ARTICULAR; INTRALESIONAL; INTRAMUSCULAR; INTRAVENOUS; SOFT TISSUE DAILY
Status: COMPLETED | OUTPATIENT
Start: 2020-01-01 | End: 2020-01-01

## 2020-01-01 RX ORDER — PAROXETINE 30 MG/1
40 TABLET, FILM COATED ORAL DAILY
COMMUNITY

## 2020-01-01 RX ORDER — SIMVASTATIN 10 MG
TABLET ORAL
Qty: 100 TAB | Refills: 0 | Status: SHIPPED | OUTPATIENT
Start: 2020-01-01

## 2020-01-01 RX ORDER — DEXTROSE AND SODIUM CHLORIDE 5; .45 G/100ML; G/100ML
INJECTION, SOLUTION INTRAVENOUS CONTINUOUS
Status: DISCONTINUED | OUTPATIENT
Start: 2020-01-01 | End: 2020-01-01 | Stop reason: HOSPADM

## 2020-01-01 RX ORDER — ESCITALOPRAM OXALATE 20 MG/1
TABLET ORAL
Qty: 90 TAB | Refills: 0 | Status: SHIPPED | OUTPATIENT
Start: 2020-01-01 | End: 2020-01-01

## 2020-01-01 RX ORDER — ESCITALOPRAM OXALATE 20 MG/1
TABLET ORAL
Qty: 30 TAB | Refills: 0 | Status: SHIPPED | OUTPATIENT
Start: 2020-01-01

## 2020-01-01 RX ORDER — SIMVASTATIN 20 MG
10 TABLET ORAL EVERY EVENING
Status: DISCONTINUED | OUTPATIENT
Start: 2020-01-01 | End: 2020-01-01

## 2020-01-01 RX ORDER — SODIUM CHLORIDE 9 MG/ML
INJECTION, SOLUTION INTRAVENOUS ONCE
Status: DISCONTINUED | OUTPATIENT
Start: 2020-01-01 | End: 2020-01-01

## 2020-01-01 RX ORDER — ONDANSETRON 2 MG/ML
4 INJECTION INTRAMUSCULAR; INTRAVENOUS EVERY 4 HOURS PRN
Status: DISCONTINUED | OUTPATIENT
Start: 2020-01-01 | End: 2020-01-01 | Stop reason: HOSPADM

## 2020-01-01 RX ORDER — MONTELUKAST SODIUM 10 MG/1
10 TABLET ORAL DAILY
Qty: 30 TAB | Refills: 0 | Status: SHIPPED | OUTPATIENT
Start: 2020-01-01 | End: 2020-01-01

## 2020-01-01 RX ORDER — CHLORPROMAZINE HYDROCHLORIDE 25 MG/1
25 TABLET, FILM COATED ORAL 3 TIMES DAILY PRN
Status: DISCONTINUED | OUTPATIENT
Start: 2020-01-01 | End: 2020-01-01 | Stop reason: HOSPADM

## 2020-01-01 RX ORDER — SODIUM CHLORIDE 9 MG/ML
1000 INJECTION, SOLUTION INTRAVENOUS CONTINUOUS
Status: DISCONTINUED | OUTPATIENT
Start: 2020-01-01 | End: 2020-01-01

## 2020-01-01 RX ORDER — LORAZEPAM 2 MG/ML
2 INJECTION INTRAMUSCULAR EVERY 4 HOURS PRN
Status: DISCONTINUED | OUTPATIENT
Start: 2020-01-01 | End: 2020-01-01

## 2020-01-01 RX ORDER — LORAZEPAM 1 MG/1
0.5 TABLET ORAL 2 TIMES DAILY
COMMUNITY

## 2020-01-01 RX ORDER — METOPROLOL TARTRATE 1 MG/ML
5 INJECTION, SOLUTION INTRAVENOUS
Status: COMPLETED | OUTPATIENT
Start: 2020-01-01 | End: 2020-01-01

## 2020-01-01 RX ORDER — TRAZODONE HYDROCHLORIDE 100 MG/1
100 TABLET ORAL NIGHTLY
COMMUNITY

## 2020-01-01 RX ORDER — DONEPEZIL HYDROCHLORIDE 10 MG/1
10 TABLET, FILM COATED ORAL
Qty: 30 TAB | Refills: 0 | Status: SHIPPED | OUTPATIENT
Start: 2020-01-01

## 2020-01-01 RX ORDER — MEMANTINE HYDROCHLORIDE 10 MG/1
10 TABLET ORAL DAILY
Status: DISCONTINUED | OUTPATIENT
Start: 2020-01-01 | End: 2020-01-01 | Stop reason: HOSPADM

## 2020-01-01 RX ADMIN — DEXTROSE AND SODIUM CHLORIDE: 5; 450 INJECTION, SOLUTION INTRAVENOUS at 18:30

## 2020-01-01 RX ADMIN — STANDARDIZED SENNA CONCENTRATE AND DOCUSATE SODIUM 2 TABLET: 8.6; 5 TABLET, FILM COATED ORAL at 18:37

## 2020-01-01 RX ADMIN — MIDODRINE HYDROCHLORIDE 7.5 MG: 5 TABLET ORAL at 11:30

## 2020-01-01 RX ADMIN — ARIPIPRAZOLE 2 MG: 2 TABLET ORAL at 05:21

## 2020-01-01 RX ADMIN — MIDODRINE HYDROCHLORIDE 5 MG: 5 TABLET ORAL at 18:41

## 2020-01-01 RX ADMIN — MEMANTINE HYDROCHLORIDE 10 MG: 10 TABLET ORAL at 06:04

## 2020-01-01 RX ADMIN — MORPHINE SULFATE 4 MG: 4 INJECTION INTRAVENOUS at 09:20

## 2020-01-01 RX ADMIN — METOPROLOL TARTRATE 5 MG: 1 INJECTION, SOLUTION INTRAVENOUS at 22:53

## 2020-01-01 RX ADMIN — MONTELUKAST 10 MG: 10 TABLET, FILM COATED ORAL at 06:43

## 2020-01-01 RX ADMIN — MIDODRINE HYDROCHLORIDE 7.5 MG: 5 TABLET ORAL at 16:59

## 2020-01-01 RX ADMIN — ARIPIPRAZOLE 2 MG: 2 TABLET ORAL at 05:41

## 2020-01-01 RX ADMIN — ASPIRIN 81 MG: 81 TABLET, COATED ORAL at 05:41

## 2020-01-01 RX ADMIN — METOPROLOL TARTRATE 5 MG: 1 INJECTION, SOLUTION INTRAVENOUS at 02:54

## 2020-01-01 RX ADMIN — ASPIRIN 81 MG: 81 TABLET, COATED ORAL at 06:04

## 2020-01-01 RX ADMIN — MIDODRINE HYDROCHLORIDE 7.5 MG: 5 TABLET ORAL at 18:37

## 2020-01-01 RX ADMIN — ENOXAPARIN SODIUM 30 MG: 40 INJECTION SUBCUTANEOUS at 07:18

## 2020-01-01 RX ADMIN — MORPHINE SULFATE 4 MG: 4 INJECTION INTRAVENOUS at 11:34

## 2020-01-01 RX ADMIN — SIMVASTATIN 10 MG: 20 TABLET, FILM COATED ORAL at 20:11

## 2020-01-01 RX ADMIN — REMDESIVIR 100 MG: 100 INJECTION, POWDER, LYOPHILIZED, FOR SOLUTION INTRAVENOUS at 17:00

## 2020-01-01 RX ADMIN — MIDODRINE HYDROCHLORIDE 7.5 MG: 5 TABLET ORAL at 17:30

## 2020-01-01 RX ADMIN — SODIUM CHLORIDE 500 ML: 9 INJECTION, SOLUTION INTRAVENOUS at 00:35

## 2020-01-01 RX ADMIN — PIPERACILLIN AND TAZOBACTAM 3.38 G: 3; .375 INJECTION, POWDER, LYOPHILIZED, FOR SOLUTION INTRAVENOUS; PARENTERAL at 23:07

## 2020-01-01 RX ADMIN — SIMVASTATIN 10 MG: 20 TABLET, FILM COATED ORAL at 18:22

## 2020-01-01 RX ADMIN — MORPHINE SULFATE 4 MG: 4 INJECTION INTRAVENOUS at 10:13

## 2020-01-01 RX ADMIN — STANDARDIZED SENNA CONCENTRATE AND DOCUSATE SODIUM 2 TABLET: 8.6; 5 TABLET, FILM COATED ORAL at 06:43

## 2020-01-01 RX ADMIN — SODIUM CHLORIDE 1000 ML: 9 INJECTION, SOLUTION INTRAVENOUS at 22:51

## 2020-01-01 RX ADMIN — DONEPEZIL HYDROCHLORIDE 10 MG: 5 TABLET, FILM COATED ORAL at 20:07

## 2020-01-01 RX ADMIN — REMDESIVIR 200 MG: 100 INJECTION, POWDER, LYOPHILIZED, FOR SOLUTION INTRAVENOUS at 13:13

## 2020-01-01 RX ADMIN — MIDODRINE HYDROCHLORIDE 7.5 MG: 5 TABLET ORAL at 05:22

## 2020-01-01 RX ADMIN — MONTELUKAST 10 MG: 10 TABLET, FILM COATED ORAL at 05:54

## 2020-01-01 RX ADMIN — ESCITALOPRAM OXALATE 20 MG: 10 TABLET ORAL at 06:27

## 2020-01-01 RX ADMIN — SODIUM CHLORIDE 1000 ML: 9 INJECTION, SOLUTION INTRAVENOUS at 21:42

## 2020-01-01 RX ADMIN — LORAZEPAM 2 MG: 2 INJECTION INTRAMUSCULAR; INTRAVENOUS at 10:30

## 2020-01-01 RX ADMIN — ASPIRIN 81 MG: 81 TABLET, COATED ORAL at 06:43

## 2020-01-01 RX ADMIN — STANDARDIZED SENNA CONCENTRATE AND DOCUSATE SODIUM 2 TABLET: 8.6; 5 TABLET, FILM COATED ORAL at 09:27

## 2020-01-01 RX ADMIN — PIPERACILLIN AND TAZOBACTAM 3.38 G: 3; .375 INJECTION, POWDER, LYOPHILIZED, FOR SOLUTION INTRAVENOUS; PARENTERAL at 08:13

## 2020-01-01 RX ADMIN — MONTELUKAST 10 MG: 10 TABLET, FILM COATED ORAL at 06:22

## 2020-01-01 RX ADMIN — SODIUM CHLORIDE 1000 ML: 9 INJECTION, SOLUTION INTRAVENOUS at 21:51

## 2020-01-01 RX ADMIN — MIDODRINE HYDROCHLORIDE 7.5 MG: 5 TABLET ORAL at 07:15

## 2020-01-01 RX ADMIN — PIPERACILLIN AND TAZOBACTAM 3.38 G: 3; .375 INJECTION, POWDER, LYOPHILIZED, FOR SOLUTION INTRAVENOUS; PARENTERAL at 23:32

## 2020-01-01 RX ADMIN — STANDARDIZED SENNA CONCENTRATE AND DOCUSATE SODIUM 2 TABLET: 8.6; 5 TABLET, FILM COATED ORAL at 06:04

## 2020-01-01 RX ADMIN — ASPIRIN 81 MG: 81 TABLET, COATED ORAL at 06:27

## 2020-01-01 RX ADMIN — MIDODRINE HYDROCHLORIDE 7.5 MG: 5 TABLET ORAL at 07:10

## 2020-01-01 RX ADMIN — ARIPIPRAZOLE 2 MG: 2 TABLET ORAL at 07:10

## 2020-01-01 RX ADMIN — ARIPIPRAZOLE 2 MG: 2 TABLET ORAL at 05:55

## 2020-01-01 RX ADMIN — PIPERACILLIN AND TAZOBACTAM 3.38 G: 3; .375 INJECTION, POWDER, LYOPHILIZED, FOR SOLUTION INTRAVENOUS; PARENTERAL at 15:48

## 2020-01-01 RX ADMIN — STANDARDIZED SENNA CONCENTRATE AND DOCUSATE SODIUM 2 TABLET: 8.6; 5 TABLET, FILM COATED ORAL at 19:19

## 2020-01-01 RX ADMIN — MEMANTINE HYDROCHLORIDE 10 MG: 10 TABLET ORAL at 05:41

## 2020-01-01 RX ADMIN — ARIPIPRAZOLE 2 MG: 2 TABLET ORAL at 06:22

## 2020-01-01 RX ADMIN — DEXAMETHASONE SODIUM PHOSPHATE 6 MG: 4 INJECTION, SOLUTION INTRA-ARTICULAR; INTRALESIONAL; INTRAMUSCULAR; INTRAVENOUS; SOFT TISSUE at 05:21

## 2020-01-01 RX ADMIN — ESCITALOPRAM OXALATE 20 MG: 10 TABLET ORAL at 06:04

## 2020-01-01 RX ADMIN — ACETAMINOPHEN 650 MG: 325 TABLET, FILM COATED ORAL at 19:27

## 2020-01-01 RX ADMIN — MEMANTINE HYDROCHLORIDE 10 MG: 10 TABLET ORAL at 06:43

## 2020-01-01 RX ADMIN — DONEPEZIL HYDROCHLORIDE 10 MG: 5 TABLET, FILM COATED ORAL at 20:55

## 2020-01-01 RX ADMIN — ACETAMINOPHEN 650 MG: 325 TABLET, FILM COATED ORAL at 20:54

## 2020-01-01 RX ADMIN — MORPHINE SULFATE 4 MG: 4 INJECTION INTRAVENOUS at 13:56

## 2020-01-01 RX ADMIN — ESCITALOPRAM OXALATE 20 MG: 10 TABLET ORAL at 05:41

## 2020-01-01 RX ADMIN — ACETAMINOPHEN 650 MG: 325 TABLET, FILM COATED ORAL at 04:27

## 2020-01-01 RX ADMIN — PIPERACILLIN AND TAZOBACTAM 3.38 G: 3; .375 INJECTION, POWDER, LYOPHILIZED, FOR SOLUTION INTRAVENOUS; PARENTERAL at 06:27

## 2020-01-01 RX ADMIN — ENOXAPARIN SODIUM 40 MG: 40 INJECTION SUBCUTANEOUS at 04:52

## 2020-01-01 RX ADMIN — DEXAMETHASONE SODIUM PHOSPHATE 6 MG: 4 INJECTION, SOLUTION INTRA-ARTICULAR; INTRALESIONAL; INTRAMUSCULAR; INTRAVENOUS; SOFT TISSUE at 04:52

## 2020-01-01 RX ADMIN — DEXAMETHASONE SODIUM PHOSPHATE 6 MG: 4 INJECTION, SOLUTION INTRA-ARTICULAR; INTRALESIONAL; INTRAMUSCULAR; INTRAVENOUS; SOFT TISSUE at 06:28

## 2020-01-01 RX ADMIN — DONEPEZIL HYDROCHLORIDE 10 MG: 5 TABLET, FILM COATED ORAL at 20:54

## 2020-01-01 RX ADMIN — PIPERACILLIN AND TAZOBACTAM 3.38 G: 3; .375 INJECTION, POWDER, LYOPHILIZED, FOR SOLUTION INTRAVENOUS; PARENTERAL at 23:29

## 2020-01-01 RX ADMIN — ACETAMINOPHEN 650 MG: 325 TABLET, FILM COATED ORAL at 22:26

## 2020-01-01 RX ADMIN — ASPIRIN 81 MG: 81 TABLET, COATED ORAL at 05:38

## 2020-01-01 RX ADMIN — MONTELUKAST 10 MG: 10 TABLET, FILM COATED ORAL at 06:27

## 2020-01-01 RX ADMIN — DEXAMETHASONE SODIUM PHOSPHATE 6 MG: 4 INJECTION, SOLUTION INTRA-ARTICULAR; INTRALESIONAL; INTRAMUSCULAR; INTRAVENOUS; SOFT TISSUE at 06:00

## 2020-01-01 RX ADMIN — ENOXAPARIN SODIUM 30 MG: 40 INJECTION SUBCUTANEOUS at 05:21

## 2020-01-01 RX ADMIN — LORAZEPAM 2 MG: 2 INJECTION INTRAMUSCULAR; INTRAVENOUS at 11:54

## 2020-01-01 RX ADMIN — ARIPIPRAZOLE 2 MG: 2 TABLET ORAL at 06:43

## 2020-01-01 RX ADMIN — ASPIRIN 81 MG: 81 TABLET, COATED ORAL at 05:21

## 2020-01-01 RX ADMIN — MIDODRINE HYDROCHLORIDE 5 MG: 5 TABLET ORAL at 05:41

## 2020-01-01 RX ADMIN — MEMANTINE HYDROCHLORIDE 10 MG: 10 TABLET ORAL at 06:22

## 2020-01-01 RX ADMIN — MEMANTINE HYDROCHLORIDE 10 MG: 10 TABLET ORAL at 06:27

## 2020-01-01 RX ADMIN — DEXTROSE AND SODIUM CHLORIDE: 5; 450 INJECTION, SOLUTION INTRAVENOUS at 13:19

## 2020-01-01 RX ADMIN — PIPERACILLIN AND TAZOBACTAM 3.38 G: 3; .375 INJECTION, POWDER, LYOPHILIZED, FOR SOLUTION INTRAVENOUS; PARENTERAL at 06:25

## 2020-01-01 RX ADMIN — PIPERACILLIN SODIUM AND TAZOBACTAM SODIUM 4.5 G: 4; .5 INJECTION, POWDER, LYOPHILIZED, FOR SOLUTION INTRAVENOUS at 22:24

## 2020-01-01 RX ADMIN — MIDODRINE HYDROCHLORIDE 5 MG: 5 TABLET ORAL at 06:22

## 2020-01-01 RX ADMIN — ESCITALOPRAM OXALATE 20 MG: 10 TABLET ORAL at 06:22

## 2020-01-01 RX ADMIN — MIDODRINE HYDROCHLORIDE 7.5 MG: 5 TABLET ORAL at 07:30

## 2020-01-01 RX ADMIN — MIDODRINE HYDROCHLORIDE 5 MG: 5 TABLET ORAL at 18:22

## 2020-01-01 RX ADMIN — DONEPEZIL HYDROCHLORIDE 10 MG: 5 TABLET, FILM COATED ORAL at 20:11

## 2020-01-01 RX ADMIN — MEMANTINE HYDROCHLORIDE 10 MG: 10 TABLET ORAL at 05:38

## 2020-01-01 RX ADMIN — MEMANTINE HYDROCHLORIDE 10 MG: 10 TABLET ORAL at 09:28

## 2020-01-01 RX ADMIN — MIDODRINE HYDROCHLORIDE 7.5 MG: 5 TABLET ORAL at 12:33

## 2020-01-01 RX ADMIN — MORPHINE SULFATE 10 MG: 100 SOLUTION ORAL at 08:50

## 2020-01-01 RX ADMIN — PIPERACILLIN AND TAZOBACTAM 3.38 G: 3; .375 INJECTION, POWDER, LYOPHILIZED, FOR SOLUTION INTRAVENOUS; PARENTERAL at 12:31

## 2020-01-01 RX ADMIN — DEXAMETHASONE SODIUM PHOSPHATE 6 MG: 4 INJECTION, SOLUTION INTRA-ARTICULAR; INTRALESIONAL; INTRAMUSCULAR; INTRAVENOUS; SOFT TISSUE at 07:18

## 2020-01-01 RX ADMIN — MEMANTINE HYDROCHLORIDE 10 MG: 10 TABLET ORAL at 05:21

## 2020-01-01 RX ADMIN — MIDODRINE HYDROCHLORIDE 7.5 MG: 5 TABLET ORAL at 12:45

## 2020-01-01 RX ADMIN — ESCITALOPRAM OXALATE 20 MG: 10 TABLET ORAL at 05:21

## 2020-01-01 RX ADMIN — SIMVASTATIN 10 MG: 20 TABLET, FILM COATED ORAL at 19:20

## 2020-01-01 RX ADMIN — MONTELUKAST 10 MG: 10 TABLET, FILM COATED ORAL at 06:04

## 2020-01-01 RX ADMIN — ACETAMINOPHEN 650 MG: 325 TABLET, FILM COATED ORAL at 08:13

## 2020-01-01 RX ADMIN — MORPHINE SULFATE 10 MG: 100 SOLUTION ORAL at 10:49

## 2020-01-01 RX ADMIN — MONTELUKAST 10 MG: 10 TABLET, FILM COATED ORAL at 05:41

## 2020-01-01 RX ADMIN — PIPERACILLIN AND TAZOBACTAM 3.38 G: 3; .375 INJECTION, POWDER, LYOPHILIZED, FOR SOLUTION INTRAVENOUS; PARENTERAL at 02:38

## 2020-01-01 RX ADMIN — STANDARDIZED SENNA CONCENTRATE AND DOCUSATE SODIUM 2 TABLET: 8.6; 5 TABLET, FILM COATED ORAL at 18:00

## 2020-01-01 RX ADMIN — ACETAMINOPHEN 650 MG: 325 TABLET, FILM COATED ORAL at 15:47

## 2020-01-01 RX ADMIN — MONTELUKAST 10 MG: 10 TABLET, FILM COATED ORAL at 09:30

## 2020-01-01 RX ADMIN — METOPROLOL TARTRATE 5 MG: 1 INJECTION, SOLUTION INTRAVENOUS at 21:44

## 2020-01-01 RX ADMIN — STANDARDIZED SENNA CONCENTRATE AND DOCUSATE SODIUM 2 TABLET: 8.6; 5 TABLET, FILM COATED ORAL at 18:22

## 2020-01-01 RX ADMIN — ARIPIPRAZOLE 2 MG: 2 TABLET ORAL at 09:30

## 2020-01-01 RX ADMIN — ENOXAPARIN SODIUM 30 MG: 40 INJECTION SUBCUTANEOUS at 06:28

## 2020-01-01 RX ADMIN — DONEPEZIL HYDROCHLORIDE 10 MG: 5 TABLET, FILM COATED ORAL at 20:45

## 2020-01-01 RX ADMIN — STANDARDIZED SENNA CONCENTRATE AND DOCUSATE SODIUM 2 TABLET: 8.6; 5 TABLET, FILM COATED ORAL at 16:59

## 2020-01-01 RX ADMIN — STANDARDIZED SENNA CONCENTRATE AND DOCUSATE SODIUM 2 TABLET: 8.6; 5 TABLET, FILM COATED ORAL at 18:40

## 2020-01-01 RX ADMIN — MONTELUKAST 10 MG: 10 TABLET, FILM COATED ORAL at 05:38

## 2020-01-01 RX ADMIN — DEXAMETHASONE SODIUM PHOSPHATE 6 MG: 4 INJECTION, SOLUTION INTRA-ARTICULAR; INTRALESIONAL; INTRAMUSCULAR; INTRAVENOUS; SOFT TISSUE at 06:22

## 2020-01-01 RX ADMIN — ARIPIPRAZOLE 2 MG: 2 TABLET ORAL at 08:13

## 2020-01-01 RX ADMIN — ENOXAPARIN SODIUM 30 MG: 40 INJECTION SUBCUTANEOUS at 06:22

## 2020-01-01 RX ADMIN — MIDODRINE HYDROCHLORIDE 7.5 MG: 5 TABLET ORAL at 06:43

## 2020-01-01 RX ADMIN — ENOXAPARIN SODIUM 30 MG: 40 INJECTION SUBCUTANEOUS at 06:04

## 2020-01-01 RX ADMIN — PIPERACILLIN AND TAZOBACTAM 3.38 G: 3; .375 INJECTION, POWDER, LYOPHILIZED, FOR SOLUTION INTRAVENOUS; PARENTERAL at 14:42

## 2020-01-01 RX ADMIN — ASPIRIN 81 MG: 81 TABLET, COATED ORAL at 05:55

## 2020-01-01 RX ADMIN — SODIUM CHLORIDE 1000 ML: 9 INJECTION, SOLUTION INTRAVENOUS at 23:35

## 2020-01-01 RX ADMIN — VANCOMYCIN HYDROCHLORIDE 1500 MG: 500 INJECTION, POWDER, LYOPHILIZED, FOR SOLUTION INTRAVENOUS at 12:33

## 2020-01-01 RX ADMIN — ACETAMINOPHEN 650 MG: 325 TABLET, FILM COATED ORAL at 22:31

## 2020-01-01 RX ADMIN — ASPIRIN 81 MG: 81 TABLET, COATED ORAL at 09:28

## 2020-01-01 RX ADMIN — ASPIRIN 81 MG: 81 TABLET, COATED ORAL at 06:21

## 2020-01-01 RX ADMIN — ENOXAPARIN SODIUM 30 MG: 40 INJECTION SUBCUTANEOUS at 05:54

## 2020-01-01 RX ADMIN — LORAZEPAM 2 MG: 2 INJECTION INTRAMUSCULAR; INTRAVENOUS at 13:29

## 2020-01-01 RX ADMIN — MIDODRINE HYDROCHLORIDE 7.5 MG: 5 TABLET ORAL at 11:46

## 2020-01-01 RX ADMIN — ENOXAPARIN SODIUM 30 MG: 40 INJECTION SUBCUTANEOUS at 05:40

## 2020-01-01 RX ADMIN — MEMANTINE HYDROCHLORIDE 10 MG: 10 TABLET ORAL at 05:54

## 2020-01-01 RX ADMIN — ESCITALOPRAM OXALATE 20 MG: 10 TABLET ORAL at 06:43

## 2020-01-01 RX ADMIN — ARIPIPRAZOLE 2 MG: 2 TABLET ORAL at 06:00

## 2020-01-01 RX ADMIN — DEXAMETHASONE SODIUM PHOSPHATE 6 MG: 4 INJECTION, SOLUTION INTRA-ARTICULAR; INTRALESIONAL; INTRAMUSCULAR; INTRAVENOUS; SOFT TISSUE at 05:38

## 2020-01-01 RX ADMIN — REMDESIVIR 100 MG: 100 INJECTION, POWDER, LYOPHILIZED, FOR SOLUTION INTRAVENOUS at 16:47

## 2020-01-01 RX ADMIN — ESCITALOPRAM OXALATE 20 MG: 10 TABLET ORAL at 05:38

## 2020-01-01 RX ADMIN — SIMVASTATIN 10 MG: 20 TABLET, FILM COATED ORAL at 18:38

## 2020-01-01 RX ADMIN — DEXAMETHASONE SODIUM PHOSPHATE 6 MG: 4 INJECTION, SOLUTION INTRA-ARTICULAR; INTRALESIONAL; INTRAMUSCULAR; INTRAVENOUS; SOFT TISSUE at 05:40

## 2020-01-01 RX ADMIN — ENOXAPARIN SODIUM 30 MG: 40 INJECTION SUBCUTANEOUS at 06:42

## 2020-01-01 RX ADMIN — ESCITALOPRAM OXALATE 20 MG: 10 TABLET ORAL at 05:53

## 2020-01-01 RX ADMIN — STANDARDIZED SENNA CONCENTRATE AND DOCUSATE SODIUM 2 TABLET: 8.6; 5 TABLET, FILM COATED ORAL at 05:38

## 2020-01-01 RX ADMIN — MIDODRINE HYDROCHLORIDE 7.5 MG: 5 TABLET ORAL at 16:47

## 2020-01-01 RX ADMIN — ACETAMINOPHEN 650 MG: 325 TABLET, FILM COATED ORAL at 02:54

## 2020-01-01 RX ADMIN — MORPHINE SULFATE 4 MG: 4 INJECTION INTRAVENOUS at 12:36

## 2020-01-01 RX ADMIN — MIDODRINE HYDROCHLORIDE 7.5 MG: 5 TABLET ORAL at 08:13

## 2020-01-01 RX ADMIN — MORPHINE SULFATE 10 MG: 100 SOLUTION ORAL at 14:16

## 2020-01-01 RX ADMIN — SODIUM CHLORIDE 1000 ML: 9 INJECTION, SOLUTION INTRAVENOUS at 01:28

## 2020-01-01 RX ADMIN — ACETAMINOPHEN 650 MG: 325 TABLET, FILM COATED ORAL at 20:07

## 2020-01-01 RX ADMIN — MIDODRINE HYDROCHLORIDE 5 MG: 5 TABLET ORAL at 09:31

## 2020-01-01 RX ADMIN — ESCITALOPRAM OXALATE 20 MG: 10 TABLET ORAL at 09:29

## 2020-01-01 RX ADMIN — ENOXAPARIN SODIUM 30 MG: 40 INJECTION SUBCUTANEOUS at 05:38

## 2020-01-01 RX ADMIN — VANCOMYCIN HYDROCHLORIDE 1000 MG: 500 INJECTION, POWDER, LYOPHILIZED, FOR SOLUTION INTRAVENOUS at 14:50

## 2020-01-01 RX ADMIN — LORAZEPAM 2 MG: 2 INJECTION INTRAMUSCULAR; INTRAVENOUS at 09:12

## 2020-01-01 RX ADMIN — MONTELUKAST 10 MG: 10 TABLET, FILM COATED ORAL at 05:21

## 2020-01-01 RX ADMIN — MIDODRINE HYDROCHLORIDE 7.5 MG: 5 TABLET ORAL at 10:11

## 2020-01-01 RX ADMIN — DEXAMETHASONE SODIUM PHOSPHATE 6 MG: 4 INJECTION, SOLUTION INTRA-ARTICULAR; INTRALESIONAL; INTRAMUSCULAR; INTRAVENOUS; SOFT TISSUE at 06:04

## 2020-01-01 RX ADMIN — DEXAMETHASONE SODIUM PHOSPHATE 6 MG: 4 INJECTION, SOLUTION INTRA-ARTICULAR; INTRALESIONAL; INTRAMUSCULAR; INTRAVENOUS; SOFT TISSUE at 06:42

## 2020-01-01 ASSESSMENT — ENCOUNTER SYMPTOMS
INSOMNIA: 0
DEPRESSION: 0
DIZZINESS: 0
SORE THROAT: 0
HEADACHES: 0
BLURRED VISION: 0
SHORTNESS OF BREATH: 0
FEVER: 0
WEAKNESS: 0
BRUISES/BLEEDS EASILY: 0
MYALGIAS: 0
DOUBLE VISION: 0
COUGH: 0
PALPITATIONS: 0
NAUSEA: 0
NECK PAIN: 0
VOMITING: 0

## 2020-01-01 ASSESSMENT — PAIN DESCRIPTION - PAIN TYPE
TYPE: OTHER (COMMENT)
TYPE: CHRONIC PAIN

## 2020-01-01 ASSESSMENT — FIBROSIS 4 INDEX: FIB4 SCORE: 4.01

## 2020-01-02 NOTE — TELEPHONE ENCOUNTER
Was the patient seen in the last year in this department? No     Does patient have an active prescription for medications requested? Yes    Received Request Via: Patient

## 2020-01-15 NOTE — TELEPHONE ENCOUNTER
1. Caller Name: Lui Denise                                           Call Back Number: [phone] (home)         Patient approves a detailed voicemail message: N\A    2. SPECIFIC Action To Be Taken: QuantiFERON-TB    3. Diagnosis/Clinical Reason for Request: TB test for care facility    4. Specialty & Provider Name/Lab/Imaging Location: [facility]    5. Is appointment scheduled for requested order/referral: yes - 01/28/2020    Patient was informed they will receive a return phone call from the office ONLY if there are any questions before processing their request. Advised to call back if they haven't received a call from the referral department in 5 days.

## 2020-07-21 NOTE — TELEPHONE ENCOUNTER
Received request via: Pharmacy    Was the patient seen in the last year in this department? Yes    Does the patient have an active prescription (recently filled or refills available) for medication(s) requested? Insurance needs 100 day supply

## 2020-12-05 PROBLEM — J12.82 PNEUMONIA DUE TO COVID-19 VIRUS: Status: ACTIVE | Noted: 2020-01-01

## 2020-12-05 PROBLEM — U07.1 PNEUMONIA DUE TO COVID-19 VIRUS: Status: ACTIVE | Noted: 2020-01-01

## 2020-12-05 PROBLEM — A41.9 SEPSIS (HCC): Status: ACTIVE | Noted: 2020-01-01

## 2020-12-05 NOTE — H&P
Hospital Medicine History & Physical Note    Date of Service  12/5/2020    Primary Care Physician  Mando Lazo M.D.    Consultants  None    Code Status  DNAR/DNI    Chief Complaint  Chief Complaint   Patient presents with   • Fever     sent from [facility] for fever   • Coronavirus Screening       History of Presenting Illness  Patient has underlying dementia and is pleasantly confused unable to answer much of my questions.  History was obtained from ER chart documentation, conversations with care home and his wife Brenda over the phone ([phone])    85 y.o. male, DNR-DNI (POLST on chart reviewed, signed on January 2018 and confirmed with Wife over the phone), who is a resident over [facility] ([phone]), who was brought to the emergency department on 12/4/2020 with fever and need to be screened for coronavirus.  Alva also report associated cough.  No chest pain, no shortness of breath, no headache, no abdominal pain.  Actually patient has no complaints whatsoever.  He is blind in is currently undergoing recent cataract surgery.  He is also hard of hearing.    ER COURSE:  -Upon arrival to the ED he is febrile with temperature of 101.7.  Tachycardic and tachypneic and initially hypotensive with blood pressure 81/55.  -Rapid Covid test came back positive.  -White count is 3.2, hemoglobin 12.5.  D-dimer is 1.59.  Imaging is showing hazy left mid lung infiltrates.  -Patient received 30 mg/kg IV fluid hydration, 1 dose of Zosyn and most recent blood pressure is much improved at 100/67.    Review of Systems  Review of Systems   Constitutional: Positive for malaise/fatigue. Negative for fever.   HENT: Negative for congestion and sore throat.    Eyes: Negative for blurred vision and double vision.   Respiratory: Negative for cough and shortness of breath.    Cardiovascular: Negative for chest pain and palpitations.   Gastrointestinal: Negative for nausea and vomiting.   Genitourinary: Negative for  dysuria and urgency.   Musculoskeletal: Negative for myalgias and neck pain.   Skin: Negative for itching and rash.   Neurological: Negative for dizziness, weakness and headaches.   Endo/Heme/Allergies: Does not bruise/bleed easily.   Psychiatric/Behavioral: Negative for depression. The patient does not have insomnia.        Past Medical History   has a past medical history of Alzheimer's dementia (HCC) and Stroke (HCC).    Surgical History   has a past surgical history that includes hernia repair.     Family History  family history includes No Known Problems in his father, maternal grandfather, maternal grandmother, mother, paternal grandfather, and paternal grandmother.     Social History   reports that he quit smoking about 24 years ago. His smoking use included cigarettes. He has a 144.00 pack-year smoking history. He quit smokeless tobacco use about 52 years ago.  His smokeless tobacco use included chew. He reports that he does not drink alcohol or use drugs.    Allergies  No Known Allergies    Medications  Prior to Admission Medications   Prescriptions Last Dose Informant Patient Reported? Taking?   ARIPiprazole (ABILIFY) 2 MG tablet   No No   Sig: TAKE 1 TABLET BY MOUTH DAILY   aspirin EC (ECOTRIN) 81 MG Tablet Delayed Response   No No   Sig: Take 1 Tab by mouth every day.   cyanocobalamin (VITAMIN B12) 1000 MCG Tab   No No   Sig: Take 1 Tab by mouth every day.   donepezil (ARICEPT) 10 MG tablet   No No   Sig: TAKE 1 TAB BY MOUTH EVERY 24 HOURS AS   NEEDED.   escitalopram (LEXAPRO) 20 MG tablet   No No   Sig: TAKE 1 TABLET BY MOUTH DAILY   memantine (NAMENDA) 10 MG Tab   No No   Sig: TAKE 1 TABLET BY MOUTH TWICE A DAY   montelukast (SINGULAIR) 10 MG Tab   No No   Sig: TAKE 1 TAB BY MOUTH EVERY DAY.   polyethylene glycol 3350 (MIRALAX) Powder   No No   Sig: MIX 17GM (1 CAPFUL) IN 8OZ GLASS OF WATER AND DRINK IT BY MOUTH DAILY   polyethylene glycol/lytes (MIRALAX) Pack   No No   Sig: Take 1 Packet by mouth  every day.   simvastatin (ZOCOR) 10 MG Tab   No No   Sig: TAKE 1 TABLET BY MOUTH EACH EVENING   vitamin D, Ergocalciferol, (DRISDOL) 04535 units Cap capsule   No No   Sig: Take 1 Cap by mouth every 7 days.      Facility-Administered Medications: None       Physical Exam  Temp:  [38.7 °C (101.7 °F)] 38.7 °C (101.7 °F)  Pulse:  [] 92  Resp:  [13-24] 17  BP: ()/(49-67) 100/67  SpO2:  [93 %-98 %] 95 %    Physical Exam  Constitutional:       Appearance: Normal appearance.   HENT:      Head: Normocephalic and atraumatic.      Nose: Nose normal.      Mouth/Throat:      Mouth: Mucous membranes are moist.      Pharynx: Oropharynx is clear.   Eyes:      Extraocular Movements: Extraocular movements intact.      Pupils: Pupils are equal, round, and reactive to light.   Neck:      Musculoskeletal: Normal range of motion and neck supple.   Cardiovascular:      Rate and Rhythm: Normal rate and regular rhythm.      Pulses: Normal pulses.      Heart sounds: Normal heart sounds.   Pulmonary:      Effort: Pulmonary effort is normal.      Breath sounds: Normal breath sounds.   Abdominal:      General: Abdomen is flat. Bowel sounds are normal.      Palpations: Abdomen is soft.   Skin:     General: Skin is warm and dry.   Neurological:      General: No focal deficit present.      Mental Status: He is alert and oriented to person, place, and time.   Psychiatric:         Mood and Affect: Mood normal.         Behavior: Behavior normal.         Laboratory:  Recent Labs     12/04/20 2145   WBC 3.2*   RBC 3.82*   HEMOGLOBIN 12.5*   HEMATOCRIT 37.6*   MCV 98.4*   MCH 32.7   MCHC 33.2*   RDW 45.8   PLATELETCT 140*   MPV 10.5     Recent Labs     12/04/20  2145   SODIUM 132*   POTASSIUM 3.7   CHLORIDE 99   CO2 23   GLUCOSE 105*   BUN 17   CREATININE 0.88   CALCIUM 8.7     Recent Labs     12/04/20  2145   ALTSGPT 10   ASTSGOT 16   ALKPHOSPHAT 67   TBILIRUBIN 1.1   GLUCOSE 105*         No results for input(s): NTPROBNP in the last 72  hours.      Recent Labs     12/04/20  2145   TROPONINT 20*       Imaging:  DX-CHEST-PORTABLE (1 VIEW)   Final Result         1.  Hazy left midlung infiltrates, new since prior study.            Assessment/Plan:  I anticipate this patient will require at least two midnights for appropriate medical management, necessitating inpatient admission.    * Pneumonia due to COVID-19 virus  Assessment & Plan  -Inpatient status on telemetry, as he presented with underlying sepsis and hypotension, just responded appropriately to IV fluids.  -Positive COVID test in the ED.   -Patient is requiring supplemental oxygen: None  -Patient started on dexamethasone 6 mg IV in the ED that will be continued.  -Please evaluate for Remdesevir in the morning.  -Other inflammatory markers: Added  -D-dimer: 1.59  -Wife is notified the patient is positive for Covid.  I also was able to confirm CODE STATUS.  He had a post sign in January 2018 and since then has been DNR-DNI.  In case of severe hypotension and hemodynamic instability.  Wife requests for him to have a central line placed.      Sepsis (HCC)  Assessment & Plan  This is Sepsis Present on admission  SIRS criteria identified on my evaluation include: Fever, with temperature greater than 101 deg F, tachycardia with heart rate above 90 bpm and tachypnea  Source is Covid pneumonia.  I am unable to exclude underlying superimposed bacterial pneumonia at this time, as he also has left midlung infiltrates.  Sepsis protocol initiated  Fluid resuscitation ordered per protocol, he received 30 mg/kg IV fluid resuscitation  IV antibiotics as appropriate for source of sepsis: Zosyn  While organ dysfunction may be noted elsewhere in this problem list or in the chart, degree of organ dysfunction does not meet CMS criteria for severe sepsis          Late onset Alzheimer's disease with behavioral disturbance (HCC)- (present on admission)  Assessment & Plan  -He is on donezepil, escitalopram, Namenda, no  repeat resolved.  Continue current home medication.  Higher risk for agitation and delirium.    Vision loss of left eye- (present on admission)  Assessment & Plan  -Status post cataract surgery.      DVT Ppx: Lovenox

## 2020-12-05 NOTE — ASSESSMENT & PLAN NOTE
This is Sepsis Present on admission  SIRS criteria identified on my evaluation include: Fever, with temperature greater than 101 deg F, tachycardia with heart rate above 90 bpm and tachypnea  Source is Covid pneumonia.  I am unable to exclude underlying superimposed bacterial pneumonia at this time, as he also has left midlung infiltrates.  Sepsis protocol initiated  Fluid resuscitation ordered per protocol, he received 30 mg/kg IV fluid resuscitation  While organ dysfunction may be noted elsewhere in this problem list or in the chart, degree of organ dysfunction does not meet CMS criteria for severe sepsis  Appears to be 2nd to covid   Will monitor  bp on the low side but pt has no symptoms  repeat lactic acid level is normal  Will repeat lactic acid levels  Recent covid and will assess to see if pt should receive any iv fluid

## 2020-12-05 NOTE — PROGRESS NOTES
Pt admitted to [facility] at 02:15hrs by stretcher. Vital signs stable (see flowsheet). Patient is alert to self only, resisting care. Per report and hx, pt is blind. No s/s of pain. Pt toileted and cleaned. SR on tele, 98-99% on room air. Antibiotics administered per order. No needs at this time. WCTM.     Fall precautions/hourly rounding maintained, call light within reach and functioning, all items within reach. Patient encouraged to call for assistance.

## 2020-12-05 NOTE — H&P
Hospital Medicine History & Physical Note    Date of Service  12/5/2020    Primary Care Physician  Mando Lazo M.D.    Consultants  None    Code Status  DNAR/DNI    Chief Complaint  Chief Complaint   Patient presents with   • Fever     sent from [facility] for fever   • Coronavirus Screening       History of Presenting Illness  85 y.o. male who presented 12/4/2020 with ***    Review of Systems  Review of Systems   Constitutional: Negative for fever.   HENT: Negative for congestion and sore throat.    Eyes: Negative for blurred vision and double vision.   Respiratory: Negative for cough and shortness of breath.    Cardiovascular: Negative for chest pain and palpitations.   Gastrointestinal: Negative for nausea and vomiting.   Genitourinary: Negative for dysuria and urgency.   Musculoskeletal: Negative for myalgias and neck pain.   Skin: Negative for itching and rash.   Neurological: Negative for dizziness, weakness and headaches.   Endo/Heme/Allergies: Does not bruise/bleed easily.   Psychiatric/Behavioral: Negative for depression. The patient does not have insomnia.        Past Medical History   has a past medical history of Alzheimer's dementia ([facility]) and Stroke ([facility]).    Surgical History   has a past surgical history that includes hernia repair.     Family History  family history includes No Known Problems in his father, maternal grandfather, maternal grandmother, mother, paternal grandfather, and paternal grandmother.     Social History   reports that he quit smoking about 24 years ago. His smoking use included cigarettes. He has a 144.00 pack-year smoking history. He quit smokeless tobacco use about 52 years ago.  His smokeless tobacco use included chew. He reports that he does not drink alcohol or use drugs.    Allergies  No Known Allergies    Medications  Prior to Admission Medications   Prescriptions Last Dose Informant Patient Reported? Taking?   ARIPiprazole (ABILIFY) 2 MG tablet   No No   Sig: TAKE  1 TABLET BY MOUTH DAILY   aspirin EC (ECOTRIN) 81 MG Tablet Delayed Response   No No   Sig: Take 1 Tab by mouth every day.   cyanocobalamin (VITAMIN B12) 1000 MCG Tab   No No   Sig: Take 1 Tab by mouth every day.   donepezil (ARICEPT) 10 MG tablet   No No   Sig: TAKE 1 TAB BY MOUTH EVERY 24 HOURS AS   NEEDED.   escitalopram (LEXAPRO) 20 MG tablet   No No   Sig: TAKE 1 TABLET BY MOUTH DAILY   memantine (NAMENDA) 10 MG Tab   No No   Sig: TAKE 1 TABLET BY MOUTH TWICE A DAY   montelukast (SINGULAIR) 10 MG Tab   No No   Sig: TAKE 1 TAB BY MOUTH EVERY DAY.   polyethylene glycol 3350 (MIRALAX) Powder   No No   Sig: MIX 17GM (1 CAPFUL) IN 8OZ GLASS OF WATER AND DRINK IT BY MOUTH DAILY   polyethylene glycol/lytes (MIRALAX) Pack   No No   Sig: Take 1 Packet by mouth every day.   simvastatin (ZOCOR) 10 MG Tab   No No   Sig: TAKE 1 TABLET BY MOUTH EACH EVENING   vitamin D, Ergocalciferol, (DRISDOL) 57145 units Cap capsule   No No   Sig: Take 1 Cap by mouth every 7 days.      Facility-Administered Medications: None       Physical Exam  Temp:  [38.7 °C (101.7 °F)] 38.7 °C (101.7 °F)  Pulse:  [] 82  Resp:  [13-24] 18  BP: (72-83)/(49-55) 83/55  SpO2:  [93 %-98 %] 98 %    Physical Exam    Laboratory:  Recent Labs     12/04/20 2145   WBC 3.2*   RBC 3.82*   HEMOGLOBIN 12.5*   HEMATOCRIT 37.6*   MCV 98.4*   MCH 32.7   MCHC 33.2*   RDW 45.8   PLATELETCT 140*   MPV 10.5     Recent Labs     12/04/20 2145   SODIUM 132*   POTASSIUM 3.7   CHLORIDE 99   CO2 23   GLUCOSE 105*   BUN 17   CREATININE 0.88   CALCIUM 8.7     Recent Labs     12/04/20 2145   ALTSGPT 10   ASTSGOT 16   ALKPHOSPHAT 67   TBILIRUBIN 1.1   GLUCOSE 105*         No results for input(s): NTPROBNP in the last 72 hours.      Recent Labs     12/04/20 2145   TROPONINT 20*       Imaging:  DX-CHEST-PORTABLE (1 VIEW)   Final Result         1.  Hazy left midlung infiltrates, new since prior study.            Assessment/Plan:  I anticipate this patient {Anticipated  Status:99349}    No new Assessment & Plan notes have been filed under this hospital service since the last note was generated.  Service: Hospital Medicine

## 2020-12-05 NOTE — ED TRIAGE NOTES
"Chief Complaint   Patient presents with   • Fever     sent from [facility] for fever   • Coronavirus Screening     ED Triage Vitals [12/04/20 2131]   Enc Vitals Group      Blood Pressure  (!) 81/55      Pulse (!) 106      Respiration (!) 24      Temperature (!) 38.7 °C (101.7 °F)      Temp src Temporal      Pulse Oximetry 95 %      Weight 59 kg (130 lb)      Height 1.575 m (5' 2\")       "

## 2020-12-05 NOTE — ED PROVIDER NOTES
CHIEF COMPLAINT  Chief Complaint   Patient presents with   • Fever     sent from [facility] for fever   • Coronavirus Screening       ADRI Denise is a 85 y.o. male who was referred from [facility] for fever and coronavirus screening.  The patient reports a recent cough.  No shortness of breath or chest pain.  No burning with urination.  No abdominal pain.  No headache.  No nausea vomiting or diarrhea.  The patient is blind and also very hard of hearing.  He is a poor historian and does have a history of dementia.    REVIEW OF SYSTEMS  All other systems are negative.     PAST MEDICAL HISTORY  Past Medical History:   Diagnosis Date   • Alzheimer's dementia    • Stroke (CMS-HCC) (HCC)     intestial blockage, memory       FAMILY HISTORY  Family History   Problem Relation Age of Onset   • No Known Problems Mother    • No Known Problems Father    • No Known Problems Maternal Grandmother    • No Known Problems Maternal Grandfather    • No Known Problems Paternal Grandmother    • No Known Problems Paternal Grandfather        SOCIAL HISTORY  Social History     Socioeconomic History   • Marital status:      Spouse name: Not on file   • Number of children: Not on file   • Years of education: Not on file   • Highest education level: Not on file   Occupational History   • Not on file   Social Needs   • Financial resource strain: Not on file   • Food insecurity     Worry: Not on file     Inability: Not on file   • Transportation needs     Medical: Not on file     Non-medical: Not on file   Tobacco Use   • Smoking status: Former Smoker     Packs/day: 3.00     Years: 48.00     Pack years: 144.00     Types: Cigarettes     Quit date: 3/19/1996     Years since quittin.7   • Smokeless tobacco: Former User     Types: Chew     Quit date: 3/19/1968   Substance and Sexual Activity   • Alcohol use: No     Alcohol/week: 0.0 oz     Comment: recoverying alcoholic  1973   • Drug use: No     Comment: former  "user 1950   • Sexual activity: Never   Lifestyle   • Physical activity     Days per week: Not on file     Minutes per session: Not on file   • Stress: Not on file   Relationships   • Social connections     Talks on phone: Not on file     Gets together: Not on file     Attends Hindu service: Not on file     Active member of club or organization: Not on file     Attends meetings of clubs or organizations: Not on file     Relationship status: Not on file   • Intimate partner violence     Fear of current or ex partner: Not on file     Emotionally abused: Not on file     Physically abused: Not on file     Forced sexual activity: Not on file   Other Topics Concern   • Not on file   Social History Narrative   • Not on file       SURGICAL HISTORY  Past Surgical History:   Procedure Laterality Date   • HERNIA REPAIR      reconstruction 2011       CURRENT MEDICATIONS  Home Medications    **Home medications have not yet been reviewed for this encounter**         ALLERGIES  No Known Allergies    PHYSICAL EXAM  VITAL SIGNS: BP (!) 81/55   Pulse (!) 106   Temp (!) 38.7 °C (101.7 °F) (Temporal)   Resp (!) 24   Ht 1.575 m (5' 2\")   Wt 59 kg (130 lb)   SpO2 95%   BMI 23.78 kg/m²      Constitutional: Well developed, Well nourished, No acute distress, Non-toxic appearance.   HENT: Normocephalic, Atraumatic, TMs normal, mucous membranes moist, no erythema, exudates, swelling, or masses, nares patent  Eyes: nonicteric  Neck: Supple, no meningismus  Lymphatic: No lymphadenopathy noted.   Cardiovascular: Tachycardic with regular rhythm, no gallops rubs or murmurs  Lungs: Clear bilaterally   Abdomen: Soft and nontender throughout  Skin: Warm, Dry, no rash  Back: No tenderness, No CVA tenderness.   Genitalia: Deferred  Rectal: Deferred  Extremities: No edema  Neurologic: Alert, appropriate, follows commands, moving all extremities, normal speech   Psychiatric: Affect normal    EKG  Time is 2214, rate 79, PAC noted, no ST-T " change, sinus rhythm, axis intervals normal    RADIOLOGY/PROCEDURES  DX-CHEST-PORTABLE (1 VIEW)   Final Result         1.  Hazy left midlung infiltrates, new since prior study.        Results for orders placed or performed during the hospital encounter of 12/04/20   CBC WITH DIFFERENTIAL   Result Value Ref Range    WBC 3.2 (L) 4.8 - 10.8 K/uL    RBC 3.82 (L) 4.70 - 6.10 M/uL    Hemoglobin 12.5 (L) 14.0 - 18.0 g/dL    Hematocrit 37.6 (L) 42.0 - 52.0 %    MCV 98.4 (H) 81.4 - 97.8 fL    MCH 32.7 27.0 - 33.0 pg    MCHC 33.2 (L) 33.7 - 35.3 g/dL    RDW 45.8 35.9 - 50.0 fL    Platelet Count 140 (L) 164 - 446 K/uL    MPV 10.5 9.0 - 12.9 fL    Neutrophils-Polys 80.70 (H) 44.00 - 72.00 %    Lymphocytes 7.80 (L) 22.00 - 41.00 %    Monocytes 10.30 0.00 - 13.40 %    Eosinophils 0.00 0.00 - 6.90 %    Basophils 0.30 0.00 - 1.80 %    Immature Granulocytes 0.90 0.00 - 0.90 %    Nucleated RBC 0.00 /100 WBC    Neutrophils (Absolute) 2.59 1.82 - 7.42 K/uL    Lymphs (Absolute) 0.25 (L) 1.00 - 4.80 K/uL    Monos (Absolute) 0.33 0.00 - 0.85 K/uL    Eos (Absolute) 0.00 0.00 - 0.51 K/uL    Baso (Absolute) 0.01 0.00 - 0.12 K/uL    Immature Granulocytes (abs) 0.03 0.00 - 0.11 K/uL    NRBC (Absolute) 0.00 K/uL   COMP METABOLIC PANEL   Result Value Ref Range    Sodium 132 (L) 135 - 145 mmol/L    Potassium 3.7 3.6 - 5.5 mmol/L    Chloride 99 96 - 112 mmol/L    Co2 23 20 - 33 mmol/L    Anion Gap 10.0 7.0 - 16.0    Glucose 105 (H) 65 - 99 mg/dL    Bun 17 8 - 22 mg/dL    Creatinine 0.88 0.50 - 1.40 mg/dL    Calcium 8.7 8.4 - 10.2 mg/dL    AST(SGOT) 16 12 - 45 U/L    ALT(SGPT) 10 2 - 50 U/L    Alkaline Phosphatase 67 30 - 99 U/L    Total Bilirubin 1.1 0.1 - 1.5 mg/dL    Albumin 3.6 3.2 - 4.9 g/dL    Total Protein 6.5 6.0 - 8.2 g/dL    Globulin 2.9 1.9 - 3.5 g/dL    A-G Ratio 1.2 g/dL   COVID/SARS CoV-2 PCR    Specimen: Nasopharyngeal; Respirate   Result Value Ref Range    COVID Order Status Received    TROPONIN   Result Value Ref Range    Troponin  T 20 (H) 6 - 19 ng/L   LACTIC ACID   Result Value Ref Range    Lactic Acid 1.3 0.5 - 2.0 mmol/L   CoV-2, Flu A/B, And RSV by PCR   Result Value Ref Range    SARS-CoV-2 Source NP Swab    ESTIMATED GFR   Result Value Ref Range    GFR If African American >60 >60 mL/min/1.73 m 2    GFR If Non African American >60 >60 mL/min/1.73 m 2   EKG (Now)   Result Value Ref Range    Report       [facility] Emergency Dept.    Test Date:  2020  Pt Name:    MIGUEL CHANDLER                 Department: [facility]  MRN:        [id number]                      [facility]  Gender:     Male                         Technician: [id number]  :        [id number]                   Requested By:RONIT LOPEZ  Order #:    [id number]                    Reading MD:    Measurements  Intervals                                Axis  Rate:       79                           P:          37  OR:         192                          QRS:        53  QRSD:       86                           T:          52  QT:         361  QTc:        414    Interpretive Statements  Sinus rhythm  Atrial premature complexes  Compared to ECG 2018 13:56:41  Atrial premature complex(es) now present  Right ventricular hypertrophy no longer present           COURSE & MEDICAL DECISION MAKING  Pertinent Labs & Imaging studies reviewed. (See chart for details)  This is an 85-year-old male who has a history of dementia and is in a SNF.  He presents for fever and was noted to be hypotensive on arrival.  His blood pressure is in the 70s to 80s.  He is currently receiving IV fluids.  He is febrile here in addition to his hypotension.  White count is borderline low.  Lactate is 1.3.  Chest x-ray demonstrates hazy left midlung infiltrates.  Patient was treated with IV fluids at 30 cc/kg as well as Zosyn for pneumonia.  COVID-19 is currently pending.  A POLST form was located which designates selective treatment and that the patient is a DNR.    /67 on  recheck (11:46pm), after completion of the 30 cc/kg bolus.    CRITICAL CARE  The very real possibilty of a deterioration of this patient's condition required the highest level of my preparedness for sudden, emergent intervention.  I provided critical care services, which included medication orders, frequent reevaluations of the patient's condition and response to treatment, ordering and reviewing test results, and discussing the case with various consultants.  The critical care time associated with the care of the patient was 30 minutes. Review chart for interventions. This time is exclusive of any other billable procedures.      FINAL IMPRESSION  1.  Pneumonia  2.  Hypotension  3.         Electronically signed by: Bartolome Mccarthy M.D., 12/4/2020 9:35 PM

## 2020-12-05 NOTE — PROGRESS NOTES
Pt is seen at the bed side and chart is reviewed,he is admitted early this morning by the admitting physician.

## 2020-12-05 NOTE — ASSESSMENT & PLAN NOTE
-Inpatient status on telemetry, as he presented with underlying sepsis and hypotension, just responded appropriately to IV fluids.  -Positive COVID test in the ED.   -Patient is requiring supplemental oxygen: None  - on dexamethasone 6 mg  daily  -procalcitonin is negative therefore no need for antibiotics  -Other inflammatory markers: Added  -D-dimer: 1.59  -Wife is notified the patient is positive for Covid.  I also was able to confirm CODE STATUS.  He had a post sign in January 2018 and since then has been DNR-DNI.  In case of severe hypotension and hemodynamic instability.  Wife requests for him to have a central line placed.    As per  pt can be discharged once facility can accept him back and so far no acceptance  Has spiked fever again and he has increased  O2 need   Blood cultures are negative times 2  UA  Is negative  cxray is pending  procalcitonin is pending  On zosyn  On vancomycin

## 2020-12-05 NOTE — ASSESSMENT & PLAN NOTE
-He is on donezepil, escitalopram, Namenda, no repeat resolved.  Continue current home medication.  Higher risk for agitation and delirium.

## 2020-12-06 NOTE — PROGRESS NOTES
Bedside report given to MULUGETA Andre. POC discussed, all questions answered. Safety precautions in place. Care released.

## 2020-12-06 NOTE — PROGRESS NOTES
Dr. Salvador notified of elevated LA. Also stated to notify if MAP is less than 60 after giving NS bolus. MAP 65 after giving bolus. Inquired as to whether or not to check LA again. No new orders at this time.

## 2020-12-06 NOTE — PROGRESS NOTES
Received report from MULUGETA Dubois. Safety precautions in place. Bed locked and low. Offered assist. Call light and belongings within reach.

## 2020-12-06 NOTE — PROGRESS NOTES
[facility] Medicine Daily Progress Note    Date of Service  12/6/2020    Chief Complaint  85 y.o. male admitted 12/4/2020 with cough    Hospital Course  Patient has underlying dementia and is pleasantly confused unable to answer much of my questions.  History was obtained from ER chart documentation, conversations with care home and his wife Brenda over the phone ([phone])     85 y.o. male, DNR-DNI (POLST on chart reviewed, signed on January 2018 and confirmed with Wife over the phone), who is a resident over [facility] ([phone]), who was brought to the emergency department on 12/4/2020 with fever and need to be screened for coronavirus.  Alva also report associated cough.  No chest pain, no shortness of breath, no headache, no abdominal pain.  Actually patient has no complaints whatsoever.  He is blind in is currently undergoing recent cataract surgery.  He is also hard of hearing.    Interval Problem Update  none    Consultants/Specialty  none    Code Status  DNAR/DNI    Disposition  pending    Review of Systems  Review of Systems   Unable to perform ROS: Dementia        Physical Exam  Temp:  [37.6 °C (99.6 °F)-38.8 °C (101.9 °F)] 37.6 °C (99.6 °F)  Pulse:  [68-90] 75  Resp:  [14-22] 20  BP: ()/(39-65) 80/52  SpO2:  [89 %-95 %] 93 %    Physical Exam  Constitutional:       General: He is not in acute distress.     Appearance: He is not toxic-appearing.   HENT:      Head: Normocephalic and atraumatic.      Nose: Nose normal.      Mouth/Throat:      Mouth: Mucous membranes are dry.   Eyes:      Extraocular Movements: Extraocular movements intact.      Pupils: Pupils are equal, round, and reactive to light.   Neck:      Musculoskeletal: Normal range of motion and neck supple.   Cardiovascular:      Rate and Rhythm: Normal rate and regular rhythm.      Pulses: Normal pulses.      Heart sounds: Normal heart sounds.   Pulmonary:      Effort: Pulmonary effort is normal.      Breath sounds: Normal  breath sounds.   Abdominal:      General: Abdomen is flat.      Palpations: Abdomen is soft.   Musculoskeletal: Normal range of motion.   Skin:     General: Skin is warm and dry.   Neurological:      Mental Status: He is alert. He is disoriented.         Fluids  No intake or output data in the 24 hours ending 12/06/20 0924    Laboratory  Recent Labs     12/04/20 2145 12/06/20  0114   WBC 3.2* 3.6*   RBC 3.82* 3.22*   HEMOGLOBIN 12.5* 10.6*   HEMATOCRIT 37.6* 33.1*   MCV 98.4* 102.8*   MCH 32.7 32.9   MCHC 33.2* 32.0*   RDW 45.8 48.0   PLATELETCT 140* 106*   MPV 10.5 10.7     Recent Labs     12/04/20 2145 12/06/20  0114   SODIUM 132* 134*   POTASSIUM 3.7 3.7   CHLORIDE 99 104   CO2 23 17*   GLUCOSE 105* 94   BUN 17 17   CREATININE 0.88 1.08   CALCIUM 8.7 7.6*                   Imaging       Assessment/Plan  * Pneumonia due to COVID-19 virus  Assessment & Plan  -Inpatient status on telemetry, as he presented with underlying sepsis and hypotension, just responded appropriately to IV fluids.  -Positive COVID test in the ED.   -Patient is requiring supplemental oxygen: None  - on dexamethasone 6 mg  daily  -procalcitonin is negative therefore no need for antibiotics  -Other inflammatory markers: Added  -D-dimer: 1.59  -Wife is notified the patient is positive for Covid.  I also was able to confirm CODE STATUS.  He had a post sign in January 2018 and since then has been DNR-DNI.  In case of severe hypotension and hemodynamic instability.  Wife requests for him to have a central line placed.      Sepsis (HCC)  Assessment & Plan  This is Sepsis Present on admission  SIRS criteria identified on my evaluation include: Fever, with temperature greater than 101 deg F, tachycardia with heart rate above 90 bpm and tachypnea  Source is Covid pneumonia.  I am unable to exclude underlying superimposed bacterial pneumonia at this time, as he also has left midlung infiltrates.  Sepsis protocol initiated  Fluid resuscitation ordered  per protocol, he received 30 mg/kg IV fluid resuscitation  While organ dysfunction may be noted elsewhere in this problem list or in the chart, degree of organ dysfunction does not meet CMS criteria for severe sepsis  Appears to be 2nd to covid   Will monitor  bp on the low side but pt has no symptoms          Late onset Alzheimer's disease with behavioral disturbance (HCC)- (present on admission)  Assessment & Plan  -He is on donezepil, escitalopram, Namenda, no repeat resolved.  Continue current home medication.  Higher risk for agitation and delirium.    Vision loss of left eye- (present on admission)  Assessment & Plan  -Status post cataract surgery.       VTE prophylaxis: lovenox

## 2020-12-07 PROBLEM — I95.9 HYPOTENSION: Status: ACTIVE | Noted: 2020-01-01

## 2020-12-07 NOTE — DISCHARGE PLANNING
Anticipated Discharge Disposition: Group home    Action: LSW left a  for [facility] Manager Matt at [phone].    Barriers to Discharge: [facility] acceptance    Plan: Care coordination to follow up with [facility].

## 2020-12-07 NOTE — PROGRESS NOTES
Patient lying pleasantly through the evening although he did not seem to sleep much. Very limited exam due to patient not allowing for staff to assess.     Refusing medications for the most part; several attempts made by RN without success.     Sinus rhythm throughout the night. 95 % on room air. Will continue to monitor.

## 2020-12-07 NOTE — DISCHARGE PLANNING
"Hospital Care Management Discharge Planning       Anticipated Discharge Disposition:   · [facility]     Action:   · DC needs unclear at this time. RN CM to follow for DC needs.   · [facility] can accept pt back in 1-2 days.      Barriers to Discharge:   · Medical clearance      Plan:   · CM to follow up with Matt Farias ([phone]) about room availability.    · Hospital Care Management Team to continue to provide support services and assistance with discharge planning as needed.       Current Expected Day of Discharge: 12/9       For further assistance please contact the assigned RN Case Manager or  at the extension listed under \"Treatment Teams\".    "

## 2020-12-07 NOTE — PROGRESS NOTES
[facility] Medicine Daily Progress Note    Date of Service  12/7/2020    Chief Complaint  85 y.o. male admitted 12/4/2020 with cough    Hospital Course  Patient has underlying dementia and is pleasantly confused unable to answer much of my questions.  History was obtained from ER chart documentation, conversations with care home and his wife Brenda over the phone ([phone])     85 y.o. male, DNR-DNI (POLST on chart reviewed, signed on January 2018 and confirmed with Wife over the phone), who is a resident over [place] [facility] ([phone]), who was brought to the emergency department on 12/4/2020 with fever and need to be screened for coronavirus.  Alva also report associated cough.  No chest pain, no shortness of breath, no headache, no abdominal pain.  Actually patient has no complaints whatsoever.  He is blind in is currently undergoing recent cataract surgery.  He is also hard of hearing.    Interval Problem Update  none    Consultants/Specialty  none    Code Status  DNAR/DNI    Disposition  pending    Review of Systems  Review of Systems   Unable to perform ROS: Dementia        Physical Exam  Temp:  [37 °C (98.6 °F)-39.4 °C (102.9 °F)] 37.9 °C (100.2 °F)  Pulse:  [89-99] 99  Resp:  [15-32] 25  BP: ()/(57-88) 92/63  SpO2:  [94 %-96 %] 95 %    Physical Exam  Constitutional:       Appearance: He is not toxic-appearing or diaphoretic.   HENT:      Head: Normocephalic and atraumatic.      Nose: No congestion or rhinorrhea.      Mouth/Throat:      Mouth: Mucous membranes are dry.   Eyes:      Conjunctiva/sclera: Conjunctivae normal.      Pupils: Pupils are equal, round, and reactive to light.   Neck:      Musculoskeletal: No neck rigidity or muscular tenderness.   Cardiovascular:      Rate and Rhythm: Normal rate and regular rhythm.      Heart sounds: No murmur. No friction rub.   Pulmonary:      Effort: No respiratory distress.      Breath sounds: No stridor.   Abdominal:      General: Abdomen is flat.  Bowel sounds are normal.   Musculoskeletal:         General: No swelling or tenderness.   Skin:     General: Skin is warm and dry.   Neurological:      Mental Status: He is alert. He is disoriented.         Fluids  No intake or output data in the 24 hours ending 12/07/20 1058    Laboratory  Recent Labs     12/04/20 2145 12/06/20  0114   WBC 3.2* 3.6*   RBC 3.82* 3.22*   HEMOGLOBIN 12.5* 10.6*   HEMATOCRIT 37.6* 33.1*   MCV 98.4* 102.8*   MCH 32.7 32.9   MCHC 33.2* 32.0*   RDW 45.8 48.0   PLATELETCT 140* 106*   MPV 10.5 10.7     Recent Labs     12/04/20 2145 12/06/20  0114   SODIUM 132* 134*   POTASSIUM 3.7 3.7   CHLORIDE 99 104   CO2 23 17*   GLUCOSE 105* 94   BUN 17 17   CREATININE 0.88 1.08   CALCIUM 8.7 7.6*                   Imaging       Assessment/Plan  * Pneumonia due to COVID-19 virus  Assessment & Plan  -Inpatient status on telemetry, as he presented with underlying sepsis and hypotension, just responded appropriately to IV fluids.  -Positive COVID test in the ED.   -Patient is requiring supplemental oxygen: None  - on dexamethasone 6 mg  daily  -procalcitonin is negative therefore no need for antibiotics  -Other inflammatory markers: Added  -D-dimer: 1.59  -Wife is notified the patient is positive for Covid.  I also was able to confirm CODE STATUS.  He had a post sign in January 2018 and since then has been DNR-DNI.  In case of severe hypotension and hemodynamic instability.  Wife requests for him to have a central line placed.    As per  pt can be accepted by the facility in 1 to 2 days    Sepsis (HCC)  Assessment & Plan  This is Sepsis Present on admission  SIRS criteria identified on my evaluation include: Fever, with temperature greater than 101 deg F, tachycardia with heart rate above 90 bpm and tachypnea  Source is Covid pneumonia.  I am unable to exclude underlying superimposed bacterial pneumonia at this time, as he also has left midlung infiltrates.  Sepsis protocol initiated  Fluid  resuscitation ordered per protocol, he received 30 mg/kg IV fluid resuscitation  While organ dysfunction may be noted elsewhere in this problem list or in the chart, degree of organ dysfunction does not meet CMS criteria for severe sepsis  Appears to be 2nd to covid   Will monitor  bp on the low side but pt has no symptoms  Will repeat lactic acid level  He loos stable          Late onset Alzheimer's disease with behavioral disturbance (HCC)- (present on admission)  Assessment & Plan  -He is on donezepil, escitalopram, Namenda, no repeat resolved.  Continue current home medication.  Higher risk for agitation and delirium.    Hypotension  Assessment & Plan  Do not believe it is 2nd to sepsis    I have added midodrine  His bp has improved    Vision loss of left eye- (present on admission)  Assessment & Plan  -Status post cataract surgery.       VTE prophylaxis: lovenox

## 2020-12-08 NOTE — DIETARY
"Nutrition services: Day 3 of admit.  Lui Denise is a 85 y.o. male with admitting DX of Pneumonia due to COVID-19 virus, Hypotension.  Poor PO intake noted.    Assessment:  Height: 157.5 cm (5' 2\")  Weight: 60.3 kg (132 lb 15 oz)  Body mass index is 24.31 kg/m²., BMI classification: Normal  Diet/Intake: Regular    Evaluation:   1. Pt is COVID + which places him at risk for poor appetite. Pt also with history of Alzheimer's.   2. Little PO intake recorded. Per CNA, pt not eating though he is a 1:1 feed.  3. Pt with discharge orders and D/C summary done, but unclear if pt able to return to group home. Pt may benefit from addition of oral nutrition supplement if he stays.  4. No skin breakdown per flow sheets.  5. Labs and medications reviewed.    Malnutrition Risk: At risk due to poor PO intake.    Recommendations/Plan:  1. Recommend addition of Boost Plus supplements.   2. Encourage intake of meals and supplements.  3. Document intake of all meals and supplements as % taken in ADL's to provide interdisciplinary communication across all shifts.   4. Monitor weight.  5. Nutrition rep will continue to see patient for ongoing meal and snack preferences.     RD following.          "

## 2020-12-08 NOTE — PROGRESS NOTES
Received pt A&O to self, with Hx of alzheimer's and dementia, [place]. ON RA with O2 sats >94% while sleeping. Pt incontinent of urine and stool. Condom cath maintained. Pt washed up and linens changed. WCTM.

## 2020-12-08 NOTE — DISCHARGE PLANNING
Anticipated Disposition:  Group Home    Action:   This CM called Matt [facility] Owner [phone], inquired bed availability, left VM for Matt to call back.           Barriers to Discharge:   Room availability       Plan:  Please follow up with Matt for acceptance.     Addendum   1 PM   Matt called this CM back, reported, their caregivers is recovering from COVID, he doesn't have enough staff to take care of Pt right now.   Please follow up with Matt for acceptance back in AM.

## 2020-12-08 NOTE — DISCHARGE SUMMARY
Discharge Summary    CHIEF COMPLAINT ON ADMISSION  Chief Complaint   Patient presents with   • Fever     sent from [facility] for fever   • Coronavirus Screening       Reason for Admission  ems     Admission Date  12/4/2020    CODE STATUS  DNAR/DNI    HPI & HOSPITAL COURSE  This is a 85 y.o. male here with   underlying dementia and is pleasantly confused unable to answer much of my questions.  History was obtained from ER chart documentation, conversations with care home and his wife Brenda over the phone ([phone])     85 y.o. male, DNR-DNI (POLST on chart reviewed, signed on January 2018 and confirmed with Wife over the phone), who is a resident over [facility] ([phone]), who was brought to the emergency department on 12/4/2020 with fever and need to be screened for coronavirus.  Alva also report associated cough.  No chest pain, no shortness of breath, no headache, no abdominal pain.  Actually patient has no complaints whatsoever.  He is blind in is currently undergoing recent cataract surgery.  He is also hard of hearing.     ER COURSE:  -Upon arrival to the ED he is febrile with temperature of 101.7.  Tachycardic and tachypneic and initially hypotensive with blood pressure 81/55.  -Rapid Covid test came back positive.  -White count is 3.2, hemoglobin 12.5.  D-dimer is 1.59.  Imaging is showing hazy left mid lung infiltrates.  -Patient received 30 mg/kg IV fluid hydration, 1 dose of Zosyn and most recent blood pressure is much improved at 100/67.  She was admitted with covid 19 and was placed on dexamethasone.no antibiotic deemed needed since his procalcitonin was negative.he also had lactis acidosis which has improved.his o2 room air in in low 90s.  He also was noted to have sepsis 2nd to covid and it has resolved.he also has advanced dementia and he is at his baseline at this time.  He will be discharged to his group home.    Therefore, he is discharged in fair and stable condition to home  with close outpatient follow-up.    The patient met 2-midnight criteria for an inpatient stay at the time of discharge.    Discharge Date  12/8/20    FOLLOW UP ITEMS POST DISCHARGE  pcp next week    DISCHARGE DIAGNOSES  Principal Problem:    Pneumonia due to COVID-19 virus POA: Unknown  Active Problems:    Sepsis (HCC) POA: Unknown    Late onset Alzheimer's disease with behavioral disturbance (HCC) POA: Yes    Hypotension POA: Unknown    Vision loss of left eye POA: Yes  Resolved Problems:    * No resolved hospital problems. *      FOLLOW UP  No future appointments.  No follow-up provider specified.    MEDICATIONS ON DISCHARGE     Medication List      START taking these medications      Instructions   dexamethasone 6 MG Tabs  Commonly known as: DECADRON   Take 1 Tab by mouth every day.  Dose: 6 mg        CONTINUE taking these medications      Instructions   ARIPiprazole 2 MG tablet  Commonly known as: Abilify   TAKE 1 TABLET BY MOUTH DAILY     aspirin EC 81 MG Tbec  Commonly known as: ECOTRIN   Take 1 Tab by mouth every day.  Dose: 81 mg     cyanocobalamin 1000 MCG Tabs  Commonly known as: VITAMIN B12   Take 1 Tab by mouth every day.  Dose: 1,000 mcg     donepezil 10 MG tablet  Commonly known as: ARICEPT   TAKE 1 TAB BY MOUTH EVERY 24 HOURS AS   NEEDED.  Dose: 10 mg     escitalopram 20 MG tablet  Commonly known as: LEXAPRO   TAKE 1 TABLET BY MOUTH DAILY     LORazepam 1 MG Tabs  Commonly known as: ATIVAN   Take 0.5 mg by mouth 2 Times a Day. Take 1/2 tablet (0.25mg) by mouth twice daily  Dose: 0.5 mg     memantine 10 MG Tabs  Commonly known as: Namenda   TAKE 1 TABLET BY MOUTH TWICE A DAY     montelukast 10 MG Tabs  Commonly known as: SINGULAIR   TAKE 1 TAB BY MOUTH EVERY DAY.  Dose: 10 mg     PARoxetine 30 MG Tabs  Commonly known as: PAXIL   Take 40 mg by mouth every day.  Dose: 40 mg     * polyethylene glycol/lytes 17 g Pack  Commonly known as: MIRALAX   Take 1 Packet by mouth every day.  Dose: 17 g     *  polyethylene glycol 3350 17 GM/SCOOP Powd  Commonly known as: MIRALAX   MIX 17GM (1 CAPFUL) IN 8OZ GLASS OF WATER AND DRINK IT BY MOUTH DAILY     simvastatin 10 MG Tabs  Commonly known as: ZOCOR   TAKE 1 TABLET BY MOUTH EACH EVENING     tamsulosin 0.4 MG capsule  Commonly known as: FLOMAX   Take 0.4 mg by mouth. Take 1 tablet by mouth every night with dinner  Dose: 0.4 mg     traZODone 100 MG Tabs  Commonly known as: DESYREL   Take 100 mg by mouth every evening. Take 1 tablet by mouth at bedtime  Dose: 100 mg     vitamin D (Ergocalciferol) 1.25 MG (24939 UT) Caps capsule  Commonly known as: DRISDOL   Take 1 Cap by mouth every 7 days.  Dose: 50,000 Units         * This list has 2 medication(s) that are the same as other medications prescribed for you. Read the directions carefully, and ask your doctor or other care provider to review them with you.                Allergies  No Known Allergies    DIET  Orders Placed This Encounter   Procedures   • Diet Order Diet: Regular     Standing Status:   Standing     Number of Occurrences:   1     Order Specific Question:   Diet:     Answer:   Regular [1]       ACTIVITY  As tolerated.  Weight bearing as tolerated    CONSULTATIONS  none    PROCEDURES  none    LABORATORY  Lab Results   Component Value Date    SODIUM 137 12/08/2020    POTASSIUM 3.8 12/08/2020    CHLORIDE 107 12/08/2020    CO2 19 (L) 12/08/2020    GLUCOSE 124 (H) 12/08/2020    BUN 26 (H) 12/08/2020    CREATININE 0.77 12/08/2020        Lab Results   Component Value Date    WBC 6.4 12/08/2020    HEMOGLOBIN 11.8 (L) 12/08/2020    HEMATOCRIT 35.3 (L) 12/08/2020    PLATELETCT 104 (L) 12/08/2020        Total time of the discharge process exceeds 35 minutes.

## 2020-12-09 NOTE — PROGRESS NOTES
Received pt A&O to self. Temp 101.6F, pt was agreeable to take Tylenol. Temp 98.6F approx. 2 hours later. NSR on tele. [facility].

## 2020-12-09 NOTE — PROGRESS NOTES
[facility] Medicine Daily Progress Note    Date of Service  12/9/2020    Chief Complaint  85 y.o. male admitted 12/4/2020 with cough    Hospital Course  Patient has underlying dementia and is pleasantly confused unable to answer much of my questions.  History was obtained from ER chart documentation, conversations with care home and his wife Brenda over the phone ([phone])     85 y.o. male, DNR-DNI (POLST on chart reviewed, signed on January 2018 and confirmed with Wife over the phone), who is a resident over [facility] ([phone]), who was brought to the emergency department on 12/4/2020 with fever and need to be screened for coronavirus.  Alva also report associated cough.  No chest pain, no shortness of breath, no headache, no abdominal pain.  Actually patient has no complaints whatsoever.  He is blind in is currently undergoing recent cataract surgery.  He is also hard of hearing.    Interval Problem Update  none    Consultants/Specialty  none    Code Status  DNAR/DNI    Disposition  pending    Review of Systems  Review of Systems   Unable to perform ROS: Dementia        Physical Exam  Temp:  [36.8 °C (98.3 °F)-38.7 °C (101.6 °F)] 37.7 °C (99.9 °F)  Pulse:  [] 125  Resp:  [15-20] 15  BP: ()/(52-75) 86/52  SpO2:  [90 %-96 %] 90 %    Physical Exam  Constitutional:       General: He is not in acute distress.     Appearance: He is not toxic-appearing.   HENT:      Head: Normocephalic and atraumatic.      Nose: Nose normal.      Mouth/Throat:      Mouth: Mucous membranes are dry.   Eyes:      Conjunctiva/sclera: Conjunctivae normal.      Pupils: Pupils are equal, round, and reactive to light.   Neck:      Musculoskeletal: Normal range of motion and neck supple.   Cardiovascular:      Rate and Rhythm: Normal rate and regular rhythm.      Pulses: Normal pulses.      Heart sounds: Normal heart sounds.   Pulmonary:      Effort: Pulmonary effort is normal.      Breath sounds: Normal breath sounds.    Abdominal:      General: Abdomen is flat.      Palpations: Abdomen is soft.   Musculoskeletal: Normal range of motion.   Skin:     Coloration: Skin is not jaundiced or pale.   Neurological:      Mental Status: He is alert. He is disoriented.         Fluids  No intake or output data in the 24 hours ending 12/09/20 1103    Laboratory  Recent Labs     12/08/20  0033   WBC 6.4   RBC 3.60*   HEMOGLOBIN 11.8*   HEMATOCRIT 35.3*   MCV 98.1*   MCH 32.8   MCHC 33.4*   RDW 45.4   PLATELETCT 104*   MPV 11.5     Recent Labs     12/08/20  0033   SODIUM 137   POTASSIUM 3.8   CHLORIDE 107   CO2 19*   GLUCOSE 124*   BUN 26*   CREATININE 0.77   CALCIUM 8.0*                   Imaging       Assessment/Plan  * Pneumonia due to COVID-19 virus  Assessment & Plan  -Inpatient status on telemetry, as he presented with underlying sepsis and hypotension, just responded appropriately to IV fluids.  -Positive COVID test in the ED.   -Patient is requiring supplemental oxygen: None  - on dexamethasone 6 mg  daily  -procalcitonin is negative therefore no need for antibiotics  -Other inflammatory markers: Added  -D-dimer: 1.59  -Wife is notified the patient is positive for Covid.  I also was able to confirm CODE STATUS.  He had a post sign in January 2018 and since then has been DNR-DNI.  In case of severe hypotension and hemodynamic instability.  Wife requests for him to have a central line placed.    As per  pt can be discharged once facility can accept him back    Sepsis (HCC)  Assessment & Plan  This is Sepsis Present on admission  SIRS criteria identified on my evaluation include: Fever, with temperature greater than 101 deg F, tachycardia with heart rate above 90 bpm and tachypnea  Source is Covid pneumonia.  I am unable to exclude underlying superimposed bacterial pneumonia at this time, as he also has left midlung infiltrates.  Sepsis protocol initiated  Fluid resuscitation ordered per protocol, he received 30 mg/kg IV fluid  resuscitation  While organ dysfunction may be noted elsewhere in this problem list or in the chart, degree of organ dysfunction does not meet CMS criteria for severe sepsis  Appears to be 2nd to covid   Will monitor  bp on the low side but pt has no symptoms  repeat lactic acid level is normal  Has resolved          Late onset Alzheimer's disease with behavioral disturbance (HCC)- (present on admission)  Assessment & Plan  -He is on donezepil, escitalopram, Namenda, no repeat resolved.  Continue current home medication.  Higher risk for agitation and delirium.    Hypotension  Assessment & Plan  Do not believe it is 2nd to sepsis    I have increaed midodrine  Will monitor    Vision loss of left eye- (present on admission)  Assessment & Plan  -Status post cataract surgery.       VTE prophylaxis: lovenox

## 2020-12-09 NOTE — PROGRESS NOTES
Patient alert to self only, sleeps between care. VSS. Refusing meds, does not tolerate regular diet d/t dentition. Switched to minced/soft diet. Boost added. Incontinent bowel/bladder, condom cath in place. Patient is medically discharged and awaiting discharge to group home, possibly tomorrow.

## 2020-12-10 NOTE — CARE PLAN
Problem: Nutritional:  Goal: Achieve adequate nutritional intake  Description: Patient will consume >50% of meals  Outcome: PROGRESSING SLOWER THAN EXPECTED   See RD note.

## 2020-12-10 NOTE — DIETARY
Nutrition Services: Update   Day 5 of admit.  Lui Denise is a 85 y.o. male with admitting DX of Pneumonia due to COVID-19 virus, Hypotension    Pt is currently on Level 5 - minced and moist diet with thin liquids. PO intake not recorded. Per CNA, pt did not eat this morning. Obtained supplement order. Added Boost Plus with meals.     Malnutrition Risk: Pt at risk with poor PO intake.    Recommendations/Plan:  1. Boost Plus with meals.   2. Encourage intake of meals and supplements.  3. Document intake of all meals and supplements as % taken in ADL's to provide interdisciplinary communication across all shifts.   4. Monitor weight.  5. Nutrition rep will continue to see patient for ongoing meal and snack preferences.    RD following

## 2020-12-10 NOTE — PROGRESS NOTES
[facility] Medicine Daily Progress Note    Date of Service  12/10/2020    Chief Complaint  85 y.o. male admitted 12/4/2020 with cough    Hospital Course  Patient has underlying dementia and is pleasantly confused unable to answer much of my questions.  History was obtained from ER chart documentation, conversations with care home and his wife Brenda over the phone ([phone])     85 y.o. male, DNR-DNI (POLST on chart reviewed, signed on January 2018 and confirmed with Wife over the phone), who is a resident over [facility] ([phone]), who was brought to the emergency department on 12/4/2020 with fever and need to be screened for coronavirus.  Alva also report associated cough.  No chest pain, no shortness of breath, no headache, no abdominal pain.  Actually patient has no complaints whatsoever.  He is blind in is currently undergoing recent cataract surgery.  He is also hard of hearing.    Interval Problem Update  none    Consultants/Specialty  none    Code Status  DNAR/DNI    Disposition  pending    Review of Systems  Review of Systems   Unable to perform ROS: Dementia        Physical Exam  Temp:  [36.4 °C (97.5 °F)-38.7 °C (101.6 °F)] 38.7 °C (101.6 °F)  Pulse:  [60-92] 69  Resp:  [15-18] 18  BP: ()/(52-84) 116/84  SpO2:  [87 %-94 %] 94 %    Physical Exam  Constitutional:       Appearance: He is not toxic-appearing or diaphoretic.   HENT:      Head: Normocephalic and atraumatic.      Nose: No congestion or rhinorrhea.      Mouth/Throat:      Mouth: Mucous membranes are dry.   Eyes:      Extraocular Movements: Extraocular movements intact.      Pupils: Pupils are equal, round, and reactive to light.   Neck:      Musculoskeletal: No neck rigidity or muscular tenderness.   Cardiovascular:      Rate and Rhythm: Normal rate and regular rhythm.      Heart sounds: No murmur. No friction rub.   Pulmonary:      Effort: No respiratory distress.      Breath sounds: No stridor.   Abdominal:      General:  Abdomen is flat. Bowel sounds are normal.   Musculoskeletal:         General: No swelling or tenderness.   Skin:     General: Skin is warm and dry.   Neurological:      Mental Status: He is alert. He is disoriented.         Fluids  No intake or output data in the 24 hours ending 12/10/20 1112    Laboratory  Recent Labs     12/08/20  0033   WBC 6.4   RBC 3.60*   HEMOGLOBIN 11.8*   HEMATOCRIT 35.3*   MCV 98.1*   MCH 32.8   MCHC 33.4*   RDW 45.4   PLATELETCT 104*   MPV 11.5     Recent Labs     12/08/20  0033   SODIUM 137   POTASSIUM 3.8   CHLORIDE 107   CO2 19*   GLUCOSE 124*   BUN 26*   CREATININE 0.77   CALCIUM 8.0*                   Imaging       Assessment/Plan  * Pneumonia due to COVID-19 virus  Assessment & Plan  -Inpatient status on telemetry, as he presented with underlying sepsis and hypotension, just responded appropriately to IV fluids.  -Positive COVID test in the ED.   -Patient is requiring supplemental oxygen: None  - on dexamethasone 6 mg  daily  -procalcitonin is negative therefore no need for antibiotics  -Other inflammatory markers: Added  -D-dimer: 1.59  -Wife is notified the patient is positive for Covid.  I also was able to confirm CODE STATUS.  He had a post sign in January 2018 and since then has been DNR-DNI.  In case of severe hypotension and hemodynamic instability.  Wife requests for him to have a central line placed.    As per  pt can be discharged once facility can accept him back and so far no acceptance  Has spiked fever again and he has no O2 need and will collect blood cultures times 2 and will get     Sepsis (HCC)  Assessment & Plan  This is Sepsis Present on admission  SIRS criteria identified on my evaluation include: Fever, with temperature greater than 101 deg F, tachycardia with heart rate above 90 bpm and tachypnea  Source is Covid pneumonia.  I am unable to exclude underlying superimposed bacterial pneumonia at this time, as he also has left midlung  infiltrates.  Sepsis protocol initiated  Fluid resuscitation ordered per protocol, he received 30 mg/kg IV fluid resuscitation  While organ dysfunction may be noted elsewhere in this problem list or in the chart, degree of organ dysfunction does not meet CMS criteria for severe sepsis  Appears to be 2nd to covid   Will monitor  bp on the low side but pt has no symptoms  repeat lactic acid level is normal  Has resolved          Late onset Alzheimer's disease with behavioral disturbance (HCC)- (present on admission)  Assessment & Plan  -He is on donezepil, escitalopram, Namenda, no repeat resolved.  Continue current home medication.  Higher risk for agitation and delirium.    Hypotension  Assessment & Plan  Do not believe it is 2nd to sepsis    I have increaed midodrine  Will monitor    Vision loss of left eye- (present on admission)  Assessment & Plan  -Status post cataract surgery.       VTE prophylaxis: lovenox

## 2020-12-10 NOTE — PROGRESS NOTES
Received pt on room air. A&O to self, confused. Incontinent of urine. Noted to desat to 87-88%; RN offered to put on NC but pt refused. WCTM.

## 2020-12-11 NOTE — PROGRESS NOTES
WBC 17.5 (6.4 three days ago). Febrile 100.9--PRN Tylenol given. UA resulted. Dr. Perez notified of results. Awaiting further orders, if any. WCTM.

## 2020-12-11 NOTE — PROGRESS NOTES
[facility] Medicine Daily Progress Note    Date of Service  12/11/2020    Chief Complaint  85 y.o. male admitted 12/4/2020 with cough    Hospital Course  Patient has underlying dementia and is pleasantly confused unable to answer much of my questions.  History was obtained from ER chart documentation, conversations with care home and his wife Brenda over the phone ([phone])     85 y.o. male, DNR-DNI (POLST on chart reviewed, signed on January 2018 and confirmed with Wife over the phone), who is a resident over [facility] ([phone]), who was brought to the emergency department on 12/4/2020 with fever and need to be screened for coronavirus.  Alva also report associated cough.  No chest pain, no shortness of breath, no headache, no abdominal pain.  Actually patient has no complaints whatsoever.  He is blind in is currently undergoing recent cataract surgery.  He is also hard of hearing.    Interval Problem Update  fever and leokocytosis    Consultants/Specialty  none    Code Status  DNAR/DNI    Disposition  pending    Review of Systems  Review of Systems   Unable to perform ROS: Dementia        Physical Exam  Temp:  [37.2 °C (98.9 °F)-38.3 °C (100.9 °F)] 37.2 °C (98.9 °F)  Pulse:  [] 88  Resp:  [18-24] 24  BP: ()/(70-73) 96/70  SpO2:  [87 %-92 %] 87 %    Physical Exam  Constitutional:       General: He is not in acute distress.     Appearance: He is not toxic-appearing.   HENT:      Head: Normocephalic and atraumatic.      Nose: Nose normal.      Mouth/Throat:      Mouth: Mucous membranes are dry.   Eyes:      Conjunctiva/sclera: Conjunctivae normal.      Pupils: Pupils are equal, round, and reactive to light.   Neck:      Musculoskeletal: Normal range of motion and neck supple.   Cardiovascular:      Rate and Rhythm: Normal rate and regular rhythm.      Pulses: Normal pulses.   Pulmonary:      Effort: No respiratory distress.      Breath sounds: No wheezing.   Abdominal:      General:  Abdomen is flat.      Palpations: Abdomen is soft.   Musculoskeletal: Normal range of motion.   Skin:     Coloration: Skin is not jaundiced or pale.   Neurological:      Mental Status: He is alert. He is disoriented.         Fluids  No intake or output data in the 24 hours ending 12/11/20 1006    Laboratory  Recent Labs     12/11/20 0229   WBC 17.5*   RBC 4.46*   HEMOGLOBIN 14.6   HEMATOCRIT 46.9   .2*   MCH 32.7   MCHC 31.1*   RDW 51.5*   PLATELETCT 196   MPV 11.4     Recent Labs     12/11/20 0229   SODIUM 142   POTASSIUM 4.3   CHLORIDE 108   CO2 20   GLUCOSE 115*   BUN 39*   CREATININE 1.06   CALCIUM 8.9                   Imaging       Assessment/Plan  * Pneumonia due to COVID-19 virus  Assessment & Plan  -Inpatient status on telemetry, as he presented with underlying sepsis and hypotension, just responded appropriately to IV fluids.  -Positive COVID test in the ED.   -Patient is requiring supplemental oxygen: None  - on dexamethasone 6 mg  daily  -procalcitonin is negative therefore no need for antibiotics  -Other inflammatory markers: Added  -D-dimer: 1.59  -Wife is notified the patient is positive for Covid.  I also was able to confirm CODE STATUS.  He had a post sign in January 2018 and since then has been DNR-DNI.  In case of severe hypotension and hemodynamic instability.  Wife requests for him to have a central line placed.    As per  pt can be discharged once facility can accept him back and so far no acceptance  Has spiked fever again and he has increased  O2 need   Blood cultures are negative times 2  UA  Is negative  cxray is pending  procalcitonin is pending  On zosyn  On vancomycin    Sepsis (HCC)  Assessment & Plan  This is Sepsis Present on admission  SIRS criteria identified on my evaluation include: Fever, with temperature greater than 101 deg F, tachycardia with heart rate above 90 bpm and tachypnea  Source is Covid pneumonia.  I am unable to exclude underlying superimposed  bacterial pneumonia at this time, as he also has left midlung infiltrates.  Sepsis protocol initiated  Fluid resuscitation ordered per protocol, he received 30 mg/kg IV fluid resuscitation  While organ dysfunction may be noted elsewhere in this problem list or in the chart, degree of organ dysfunction does not meet CMS criteria for severe sepsis  Appears to be 2nd to covid   Will monitor  bp on the low side but pt has no symptoms  repeat lactic acid level is normal  Will repeat lactic acid levels  Recent covid and will assess to see if pt should receive any iv fluid          Late onset Alzheimer's disease with behavioral disturbance (HCC)- (present on admission)  Assessment & Plan  -He is on donezepil, escitalopram, Namenda, no repeat resolved.  Continue current home medication.  Higher risk for agitation and delirium.    Hypotension  Assessment & Plan  Do not believe it is 2nd to sepsis    I have increaed midodrine  Will monitor    Vision loss of left eye- (present on admission)  Assessment & Plan  -Status post cataract surgery.       VTE prophylaxis: lovenox

## 2020-12-11 NOTE — DISCHARGE PLANNING
"Hospital Care Management Discharge Planning       Anticipated Discharge Disposition:   ·      Action:   · Per  owner, Matt Farias, they will not have abed for this pt until Monday.      Barriers to Discharge:   · Bed at      Plan:    · Hospital Care Management Team to continue to provide support services and assistance with discharge planning as needed.       Current Expected Day of Discharge: 12/14       For further assistance please contact the assigned RN Case Manager or  at the extension listed under \"Treatment Teams\".    "

## 2020-12-11 NOTE — PROGRESS NOTES
"Pharmacy Kinetics 85 y.o. male on vancomycin day # 1  2020    Currently on Vancomycin 1500 mg iv - loading dose     Indication for Treatment: unknown source of infection (possible superimposed bacterial pneumonia with COVID)     Pertinent history per medical record: 85 y.o. male, who is a resident over [facility], who was brought to the emergency department on 2020 with fever and need to be screened for coronavirus.  No chest pain, no shortness of breath, no headache, no abdominal pain.  Actually patient has no complaints whatsoever.  He is blind in is currently undergoing recent cataract surgery.  He is also hard of hearing. Source is Covid pneumonia.  Unable to exclude underlying superimposed bacterial pneumonia at this time, as he also has left midlung infiltrates.    Other antibiotics: Zosyn 3.375 mg iv q8hr     Allergies: Patient has no known allergies.     List concerns for renal function (possible concerns include abnormal LFTs, BUN/SCr ratio > 20:1, CHF, obesity, malnutrition/low albumin, hypermetabolic state (SIRS), pressors/hypotension, nephrotoxic drugs, etc.): age, renal function, BUN/SCr ratio >20:1, SIRS     Pertinent cultures to date:    - blood x 2: preliminary NEG  12/10 - blood x 2: Preliminary NEG     Recent Labs     20  0229   WBC 17.5*   NEUTSPOLYS 86.90*     Recent Labs     20  0229   BUN 39*   CREATININE 1.06   ALBUMIN 2.7*     No results for input(s): VANCOTROUGH, VANCOPEAK, VANCORANDOM in the last 72 hours.No intake or output data in the 24 hours ending 20 1142   BP (!) 96/70   Pulse 88   Temp 37.2 °C (98.9 °F) (Temporal)   Resp (!) 24   Ht 1.575 m (5' 2\")   Wt 60.3 kg (132 lb 15 oz)   SpO2 (!) 87%  Temp (24hrs), Av.7 °C (99.8 °F), Min:37.2 °C (98.9 °F), Max:38.3 °C (100.9 °F)      A/P   1. Vancomycin dose change: 1000 mg iv q24hr (maintance dose)   2. Next vancomycin level:  @ 1130  3. Goal trough: 15 - 20 mcg/mL  4. Comments: Pharmacy will " continue to monitor and dose adjust per protocol     Sunita Evans, Pharmacy Intern

## 2020-12-11 NOTE — PROGRESS NOTES
Pt with hiccups at the beginning of shift. Pt given cold water, repositioned yet the hiccups persisted for several hours. Dr. Perez notified and PRN Thorkristen ordered. When RN entered to admin the med, pt was sleeping and no longer had hiccups. WCTM.

## 2020-12-11 NOTE — PROGRESS NOTES
Text paged Dr. Lewis with critical results from blood cultures.  Dr. Lewis confirmed receipt of my text.

## 2020-12-11 NOTE — PROGRESS NOTES
Spoke with wife Brenda with an update of POC and patient's status.  I held the phone to Lui's ear so he could talk with her.  She encouraged her  to eat, take meds, and keep 02 on.

## 2020-12-12 PROBLEM — J96.90 RESPIRATORY FAILURE (HCC): Status: ACTIVE | Noted: 2020-01-01

## 2020-12-12 PROBLEM — N17.9 AKI (ACUTE KIDNEY INJURY) (HCC): Status: ACTIVE | Noted: 2020-01-01

## 2020-12-12 NOTE — ASSESSMENT & PLAN NOTE
Likely in the setting of hypotension from sepsis.  Will monitor renal functions and urine OP closely.    Dehydration too and on fluids Na was 148

## 2020-12-12 NOTE — PROGRESS NOTES
Pt found tachy in the 160s, RR in the 30s; temp 102.2, pt with chills, pt removed nasal cannula, satting in the 60s, BP previously 132/104, then down to 119/90. Tylenol administered; nasal cannula replaced, pt still satting in the 60-70s @ 15L. NC replaced with non-rebreather at 15L; pt then satting at 92%. Dr Landaverde informed; EKG ordered and results sent to MD. Metoprolol and 1L NS ordered and administered. Lactic acid 8.5; Dr wolff. Pt ordered to be tranferred to the ICU. Pt transferred to ICU at 22:00hrs.

## 2020-12-12 NOTE — ASSESSMENT & PLAN NOTE
- Positive COVID-19 test. Started on dexamethasone 6 mg daily.  - Was not considered for Remdesivir for possibly reduced oxygen requirements.   - Started Remdesivir. Discussed the indications and EUA by FDA with the wife  - monitor creatinine clearance

## 2020-12-12 NOTE — PROGRESS NOTES
2215: Pt up to with with @ RN. Pt transferred to bed via slide board. Pt is alert, oriented to self. Pt not complaining of any pain at this time. Pt attached to ICU monitoring. Pt is in SR at this time. Pt unable to take medications or PO intake at this time. Updates given to pts wife Brenda. Skin check completed, skin not WNL, see flowsheet.

## 2020-12-12 NOTE — CONSULTS
Critical Care Consultation    Date of consult: 12/12/2020    Referring Physician  Omer Tomlin M.D.    Reason for Consultation  High lactic acid    History of Presenting Illness  85 y.o. male who presented 12/4/2020 with high lactic acid and increasing hypoxic respiratory failure. Patient is not able to give much history. I called his wife and obtained some history and also discussed with patients medical and nursing staff.   84 yo male with known dementia who is DNR/DNI presented with fevers. Admitted to the floor and was started on dexamethasone after he was diagnosed with COVID-19. Last night patient became tachycardic and hypoxic. He was started on broad spectrum abx and transferred here.     Code Status  DNAR/DNI    Review of Systems  Review of Systems   Unable to perform ROS: Dementia       Past Medical History   has a past medical history of Alzheimer's dementia (HCC) and Stroke (HCC).    Surgical History   has a past surgical history that includes hernia repair.    Family History  family history includes No Known Problems in his father, maternal grandfather, maternal grandmother, mother, paternal grandfather, and paternal grandmother.    Social History   reports that he quit smoking about 24 years ago. His smoking use included cigarettes. He has a 144.00 pack-year smoking history. He quit smokeless tobacco use about 52 years ago.  His smokeless tobacco use included chew. He reports that he does not drink alcohol or use drugs.    Medications  Home Medications     Reviewed by Danika Myers R.N. (Registered Nurse) on 12/05/20 at 0630  Med List Status: <None>   Medication Last Dose Status   ARIPiprazole (ABILIFY) 2 MG tablet 12/4/2020 Active   aspirin EC (ECOTRIN) 81 MG Tablet Delayed Response 12/4/2020 Active   cyanocobalamin (VITAMIN B12) 1000 MCG Tab 12/4/2020 Active   donepezil (ARICEPT) 10 MG tablet 12/4/2020 Active   escitalopram (LEXAPRO) 20 MG tablet 12/4/2020 Active   LORazepam (ATIVAN) 1 MG  Tab 12/4/2020 Active   memantine (NAMENDA) 10 MG Tab 12/4/2020 Active   montelukast (SINGULAIR) 10 MG Tab 12/4/2020 Active   PARoxetine (PAXIL) 30 MG Tab 12/4/2020 Active   polyethylene glycol 3350 (MIRALAX) Powder 12/4/2020 Active   polyethylene glycol/lytes (MIRALAX) Pack  Active   simvastatin (ZOCOR) 10 MG Tab 12/4/2020 Active   tamsulosin (FLOMAX) 0.4 MG capsule 12/4/2020 Active   traZODone (DESYREL) 100 MG Tab 12/4/2020 Active   vitamin D, Ergocalciferol, (DRISDOL) 39442 units Cap capsule 12/4/2020 Active              Current Facility-Administered Medications   Medication Dose Route Frequency Provider Last Rate Last Admin   • remdesivir (Veklury) 200 mg in  mL IVPB  200 mg Intravenous Once Omer Tomlin M.D.       • [START ON 12/13/2020] remdesivir (Veklury) 100 mg in  mL IVPB  100 mg Intravenous DAILY AT 1800 Omer Tomlin M.D.       • chlorproMAZINE (THORAZINE) tablet 25 mg  25 mg Oral TID PRN Jose L Perez D.O.       • MD Alert...Vancomycin per Pharmacy   Other PHARMACY TO DOSE H Josefina Lewis M.D.       • piperacillin-tazobactam (ZOSYN) 3.375 g in  mL IVPB  3.375 g Intravenous Q8HRS H Josefina Lewis M.D.   Stopped at 12/12/20 1213   • vancomycin (VANCOCIN) 1,000 mg in  mL IVPB  15 mg/kg Intravenous Q24HR H Josefina Lewis M.D.       • adenosine (ADENOCARD) injection 6 mg  6 mg Intravenous Once Jayme Landaverde M.D.   Stopped at 12/11/20 2145   • Metoprolol Tartrate (LOPRESSOR) injection 5 mg  5 mg Intravenous Q5 MIN PRN Jayme Landaverde M.D.   5 mg at 12/11/20 2144   • NS infusion 1,000 mL  1,000 mL Intravenous Continuous Jayme Landaverde M.D.   Stopped at 12/12/20 0749   • midodrine (PROAMATINE) tablet 7.5 mg  7.5 mg Oral TID WITH MEALS H Josefina Lewis M.D.   7.5 mg at 12/12/20 1146   • enoxaparin (LOVENOX) inj 30 mg  30 mg Subcutaneous DAILY H Josefina Lewis M.D.   30 mg at 12/12/20 0538   • ARIPiprazole (Abilify) tablet 2 mg  2 mg Oral DAILY Trish Lizama M.D.    2 mg at 12/12/20 0813   • aspirin EC (ECOTRIN) tablet 81 mg  81 mg Oral DAILY Trish Lizama M.D.   81 mg at 12/12/20 0538   • donepezil (ARICEPT) tablet 10 mg  10 mg Oral Nightly Trish Lizama M.D.   Stopped at 12/11/20 2100   • escitalopram (Lexapro) tablet 20 mg  20 mg Oral DAILY Trish Lizama M.D.   20 mg at 12/12/20 0538   • memantine (Namenda) tablet 10 mg  10 mg Oral DAILY Trish Lizama M.D.   10 mg at 12/12/20 0538   • montelukast (SINGULAIR) tablet 10 mg  10 mg Oral DAILY CIPRIANO TranDJerri   10 mg at 12/12/20 0538   • senna-docusate (PERICOLACE or SENOKOT S) 8.6-50 MG per tablet 2 Tab  2 Tab Oral BID Trish Lizama M.D.   2 Tab at 12/12/20 0538    And   • polyethylene glycol/lytes (MIRALAX) PACKET 1 Packet  1 Packet Oral QDAY PRN Trish Lizama M.D.        And   • magnesium hydroxide (MILK OF MAGNESIA) suspension 30 mL  30 mL Oral QDAY PRN Trish Lizama M.D.        And   • bisacodyl (DULCOLAX) suppository 10 mg  10 mg Rectal QDAY PRN Trish Lizama M.D.       • acetaminophen (Tylenol) tablet 650 mg  650 mg Oral Q6HRS PRN Trish Lizama M.D.   650 mg at 12/12/20 0813   • guaiFENesin dextromethorphan (ROBITUSSIN DM) 100-10 MG/5ML syrup 10 mL  10 mL Oral Q6HRS PRN Trish Lizama M.D.       • ondansetron (ZOFRAN) syringe/vial injection 4 mg  4 mg Intravenous Q4HRS PRN Trish Lizama M.D.       • ondansetron (ZOFRAN ODT) dispertab 4 mg  4 mg Oral Q4HRS PRN Trish Lizama M.D.       • dexamethasone (DECADRON) injection 6 mg  6 mg Intravenous DAILY Trish Lizama M.D.   6 mg at 12/12/20 0538       Allergies  No Known Allergies    Vital Signs last 24 hours  Temp:  [37.1 °C (98.8 °F)-39 °C (102.2 °F)] 37.3 °C (99.2 °F)  Pulse:  [] 91  Resp:  [21-47] 26  BP: ()/() 95/62  SpO2:  [65 %-97 %] 90 %    Physical Exam  Physical Exam  Constitutional:       Appearance: He is  ill-appearing.   HENT:      Head: Normocephalic.      Nose: Congestion present.      Mouth/Throat:      Pharynx: Oropharyngeal exudate present.   Cardiovascular:      Rate and Rhythm: Normal rate.   Pulmonary:      Breath sounds: Wheezing and rhonchi present.   Abdominal:      General: Abdomen is flat.      Palpations: Abdomen is soft.   Neurological:      Mental Status: He is alert.         Fluids    Intake/Output Summary (Last 24 hours) at 12/12/2020 1226  Last data filed at 12/12/2020 1000  Gross per 24 hour   Intake 2251.98 ml   Output 400 ml   Net 1851.98 ml       Laboratory  Recent Results (from the past 48 hour(s))   BLOOD CULTURE    Collection Time: 12/10/20 12:40 PM    Specimen: Peripheral; Blood   Result Value Ref Range    Significant Indicator POS (POS)     Source BLD     Site PERIPHERAL     Culture Result (A)      Growth detected by Bactec instrument. 12/11/2020  15:09  Gram Stain: Gram positive cocci: Possible Staphylococcus sp.  Negative for Staphylococcus aureus and MRSA by PCR. Correlate  ongoing need for antibiotics with clinical condition.  Further report to follow.      Culture Result Coagulase-negative Staphylococcus species (A)    BLOOD CULTURE    Collection Time: 12/10/20 12:40 PM    Specimen: Peripheral; Blood   Result Value Ref Range    Significant Indicator NEG     Source BLD     Site PERIPHERAL     Culture Result       No Growth  Note: Blood cultures are incubated for 5 days and  are monitored continuously.Positive blood cultures  are called to the RN and reported as soon as  they are identified.     URINALYSIS    Collection Time: 12/10/20  7:35 PM    Specimen: Urine, Clean Catch   Result Value Ref Range    Color Yellow     Character Sl Cloudy (A)     Specific Gravity 1.025 <1.035    Ph 6.0 5.0 - 8.0    Glucose Negative Negative mg/dL    Ketones Trace (A) Negative mg/dL    Protein 100 (A) Negative mg/dL    Bilirubin Small (A) Negative    Nitrite Negative Negative    Leukocyte Esterase  Negative Negative    Occult Blood Small (A) Negative    Micro Urine Req Microscopic    URINE MICROSCOPIC (W/UA)    Collection Time: 12/10/20  7:35 PM   Result Value Ref Range    WBC 0-2 (A) /hpf    RBC 2-5 (A) /hpf    Bacteria Few (A) None /hpf    Epithelial Cells Rare Few /hpf    Mucous Threads Moderate /hpf    Urine Crystals Mod Amorphous /hpf    Granular Casts 0-2 /lpf   CBC WITH DIFFERENTIAL    Collection Time: 12/11/20  2:29 AM   Result Value Ref Range    WBC 17.5 (H) 4.8 - 10.8 K/uL    RBC 4.46 (L) 4.70 - 6.10 M/uL    Hemoglobin 14.6 14.0 - 18.0 g/dL    Hematocrit 46.9 42.0 - 52.0 %    .2 (H) 81.4 - 97.8 fL    MCH 32.7 27.0 - 33.0 pg    MCHC 31.1 (L) 33.7 - 35.3 g/dL    RDW 51.5 (H) 35.9 - 50.0 fL    Platelet Count 196 164 - 446 K/uL    MPV 11.4 9.0 - 12.9 fL    Neutrophils-Polys 86.90 (H) 44.00 - 72.00 %    Lymphocytes 4.30 (L) 22.00 - 41.00 %    Monocytes 2.90 0.00 - 13.40 %    Eosinophils 0.00 0.00 - 6.90 %    Basophils 0.80 0.00 - 1.80 %    Immature Granulocytes 5.10 (H) 0.00 - 0.90 %    Nucleated RBC 0.10 /100 WBC    Neutrophils (Absolute) 15.26 (H) 1.82 - 7.42 K/uL    Lymphs (Absolute) 0.75 (L) 1.00 - 4.80 K/uL    Monos (Absolute) 0.50 0.00 - 0.85 K/uL    Eos (Absolute) 0.00 0.00 - 0.51 K/uL    Baso (Absolute) 0.14 (H) 0.00 - 0.12 K/uL    Immature Granulocytes (abs) 0.89 (H) 0.00 - 0.11 K/uL    NRBC (Absolute) 0.02 K/uL   Comp Metabolic Panel    Collection Time: 12/11/20  2:29 AM   Result Value Ref Range    Sodium 142 135 - 145 mmol/L    Potassium 4.3 3.6 - 5.5 mmol/L    Chloride 108 96 - 112 mmol/L    Co2 20 20 - 33 mmol/L    Anion Gap 14.0 7.0 - 16.0    Glucose 115 (H) 65 - 99 mg/dL    Bun 39 (H) 8 - 22 mg/dL    Creatinine 1.06 0.50 - 1.40 mg/dL    Calcium 8.9 8.4 - 10.2 mg/dL    AST(SGOT) 96 (H) 12 - 45 U/L    ALT(SGPT) 71 (H) 2 - 50 U/L    Alkaline Phosphatase 80 30 - 99 U/L    Total Bilirubin 1.7 (H) 0.1 - 1.5 mg/dL    Albumin 2.7 (L) 3.2 - 4.9 g/dL    Total Protein 6.5 6.0 - 8.2 g/dL     Globulin 3.8 (H) 1.9 - 3.5 g/dL    A-G Ratio 0.7 g/dL   ESTIMATED GFR    Collection Time: 20  2:29 AM   Result Value Ref Range    GFR If African American >60 >60 mL/min/1.73 m 2    GFR If Non African American >60 >60 mL/min/1.73 m 2   PROCALCITONIN    Collection Time: 20 11:14 AM   Result Value Ref Range    Procalcitonin 0.71 (H) <0.25 ng/mL   LACTIC ACID    Collection Time: 20 11:14 AM   Result Value Ref Range    Lactic Acid 2.0 0.5 - 2.0 mmol/L   LACTIC ACID    Collection Time: 20  5:12 PM   Result Value Ref Range    Lactic Acid 2.6 (H) 0.5 - 2.0 mmol/L   EKG    Collection Time: 20  8:32 PM   Result Value Ref Range    Report       [facility]    Test Date:  2020  Pt Name:    MIGUEL CHANDLER                 Department: [facility]  MRN:        [id number]                      Room:       [facility]  Gender:     Male                         Technician: [id number]  :        [id number]                   Requested By:MICHAEL GARCIA  Order #:    [id number]                    Reading MD: Fei Mazariegos MD    Measurements  Intervals                                Axis  Rate:       128                          P:          27  IA:         126                          QRS:        -20  QRSD:       79                           T:          41  QT:         333  QTc:        486    Interpretive Statements  Sinus tachycardia  Abnormal R-wave progression, late transition      Electronically Signed On 2020 6:42:24 PST by Fei Mazariegos MD     ACCU-CHEK GLUCOSE    Collection Time: 20  9:16 PM   Result Value Ref Range    Glucose - Accu-Ck 122 (H) 65 - 99 mg/dL   LACTIC ACID    Collection Time: 20  9:25 PM   Result Value Ref Range    Lactic Acid 8.5 (HH) 0.5 - 2.0 mmol/L   proBrain Natriuretic Peptide, NT    Collection Time: 20  9:25 PM   Result Value Ref Range    NT-proBNP 668 (H) 0 - 125 pg/mL   CBC WITH DIFFERENTIAL    Collection Time: 20  1:33 AM   Result Value Ref Range    WBC 13.8 (H)  4.8 - 10.8 K/uL    RBC 3.66 (L) 4.70 - 6.10 M/uL    Hemoglobin 12.4 (L) 14.0 - 18.0 g/dL    Hematocrit 37.4 (L) 42.0 - 52.0 %    .2 (H) 81.4 - 97.8 fL    MCH 33.9 (H) 27.0 - 33.0 pg    MCHC 33.2 (L) 33.7 - 35.3 g/dL    RDW 50.4 (H) 35.9 - 50.0 fL    Platelet Count 185 164 - 446 K/uL    MPV 10.9 9.0 - 12.9 fL    Neutrophils-Polys 77.00 (H) 44.00 - 72.00 %    Lymphocytes 1.00 (L) 22.00 - 41.00 %    Monocytes 2.00 0.00 - 13.40 %    Eosinophils 1.00 0.00 - 6.90 %    Basophils 1.00 0.00 - 1.80 %    Nucleated RBC 0.10 /100 WBC    Neutrophils (Absolute) 12.56 (H) 1.82 - 7.42 K/uL    Lymphs (Absolute) 0.14 (L) 1.00 - 4.80 K/uL    Monos (Absolute) 0.28 0.00 - 0.85 K/uL    Eos (Absolute) 0.14 0.00 - 0.51 K/uL    Baso (Absolute) 0.14 (H) 0.00 - 0.12 K/uL    NRBC (Absolute) 0.02 K/uL    Macrocytosis 1+    Comp Metabolic Panel    Collection Time: 12/12/20  1:33 AM   Result Value Ref Range    Sodium 149 (H) 135 - 145 mmol/L    Potassium 4.1 3.6 - 5.5 mmol/L    Chloride 118 (H) 96 - 112 mmol/L    Co2 21 20 - 33 mmol/L    Anion Gap 10.0 7.0 - 16.0    Glucose 115 (H) 65 - 99 mg/dL    Bun 46 (H) 8 - 22 mg/dL    Creatinine 1.23 0.50 - 1.40 mg/dL    Calcium 8.0 (L) 8.4 - 10.2 mg/dL    AST(SGOT) 53 (H) 12 - 45 U/L    ALT(SGPT) 44 2 - 50 U/L    Alkaline Phosphatase 70 30 - 99 U/L    Total Bilirubin 1.2 0.1 - 1.5 mg/dL    Albumin 2.1 (L) 3.2 - 4.9 g/dL    Total Protein 5.2 (L) 6.0 - 8.2 g/dL    Globulin 3.1 1.9 - 3.5 g/dL    A-G Ratio 0.7 g/dL   LACTIC ACID    Collection Time: 12/12/20  1:33 AM   Result Value Ref Range    Lactic Acid 2.1 (H) 0.5 - 2.0 mmol/L   ESTIMATED GFR    Collection Time: 12/12/20  1:33 AM   Result Value Ref Range    GFR If African American >60 >60 mL/min/1.73 m 2    GFR If Non  56 (A) >60 mL/min/1.73 m 2   DIFFERENTIAL MANUAL    Collection Time: 12/12/20  1:33 AM   Result Value Ref Range    Bands-Stabs 14.00 (H) 0.00 - 10.00 %    Metamyelocytes 4.00 %    Manual Diff Status PERFORMED     PLATELET ESTIMATE    Collection Time: 12/12/20  1:33 AM   Result Value Ref Range    Plt Estimation Normal    MORPHOLOGY    Collection Time: 12/12/20  1:33 AM   Result Value Ref Range    RBC Morphology Present     Poikilocytosis 1+     Ovalocytes 1+     Echinocytes 1+    ISTAT ARTERIAL BLOOD GAS    Collection Time: 12/12/20  3:36 AM   Result Value Ref Range    Ph 7.420 7.400 - 7.500    Pco2 48.2 (H) 26.0 - 37.0 mmHg    Po2 104 (H) 64 - 87 mmHg    Tco2 33 20 - 33 mmol/L    S02 98 93 - 99 %    Hco3 31.2 (H) 17.0 - 25.0 mmol/L    BE 6 (H) -4 - 3 mmol/L    Body Temp see below degrees    O2 Therapy 85 %    iPF Ratio 122     Specimen Arterial     Action Range Triggered NO     Inst. Qualified Patient YES    LACTIC ACID    Collection Time: 12/12/20  8:10 AM   Result Value Ref Range    Lactic Acid 2.0 0.5 - 2.0 mmol/L       Imaging  DX-CHEST-PORTABLE (1 VIEW)   Final Result      Significant worsening now with multifocal bilateral opacification compatible with mild-to-moderate Covid pneumonia      DX-CHEST-PORTABLE (1 VIEW)   Final Result         1.  Hazy left midlung infiltrates, new since prior study.          Assessment/Plan  * Pneumonia due to COVID-19 virus  Assessment & Plan  - Positive COVID-19 test. Started on dexamethasone 6 mg daily.  - Was not considered for Remdesivir for possibly reduced oxygen requirements.   - Will consider initiating Remdesivir. Discussed the indications and EUA by FDA with the wife      Sepsis (HCC)  Assessment & Plan  This is Sepsis Present on admission  SIRS criteria identified on my evaluation include: Fever, with temperature greater than 101 deg F, Tachycardia, with heart rate greater than 90 BPM and Leukocytosis, with WBC greater than 12,000  Source is Lungs  Sepsis protocol initiated  Fluid resuscitation ordered per protocol  IV antibiotics as appropriate for source of sepsis  While organ dysfunction may be noted elsewhere in this problem list or in the chart, degree of organ  dysfunction does not meet CMS criteria for severe sepsis  - 12/12: Admitted to ICU with elevated lactic acid and increasing oxygen requirement. On 8L. Doing well. Started on broad spectrum abx. BC positive for CoNS - likely contaminant. Will repeat cultures.           PAULINA (acute kidney injury) (HCC)  Assessment & Plan  Likely in the setting of hypotension from sepsis.  Will monitor renal functions and urine OP closely.      Respiratory failure (HCC)  Assessment & Plan  - Hypoxic resp failure worsening.   - CXR consistent with worsening bilateral opacities - could be secondary to worsening resp failure sec to COVID or superadded bacterial pna.   - Covered with broad spectrum abx. Will check for NP swab for MRSA - if negative we will discontinue Vancomycin and continue Zosyn for 7 days.     Late onset Alzheimer's disease with behavioral disturbance (HCC)- (present on admission)  Assessment & Plan  - He is on donezepil, escitalopram, Namenda, no repeat resolved.    - Continue current home medication.  Higher risk for agitation and delirium.      Discussed patient condition and risk of morbidity and/or mortality with Hospitalist, Family, RN, RT and Therapies.    The patient remains critically ill.  Critical care time = 75 minutes in directly providing and coordinating critical care and extensive data review.  No time overlap and excludes procedures.

## 2020-12-12 NOTE — ASSESSMENT & PLAN NOTE
- Hypoxic resp failure worsening.   - CXR consistent with worsening bilateral opacities - secondary to covid pna  - no obvious signs of secondary infection

## 2020-12-12 NOTE — ASSESSMENT & PLAN NOTE
- He is on donezepil, escitalopram, Namenda   - Continue current home medication.  Higher risk for agitation and delirium.

## 2020-12-12 NOTE — PROGRESS NOTES
"Pharmacy Kinetics 85 y.o. male on vancomycin day # 2 2020    Currently on Vancomycin 1500 mg iv given  1233  Provider specified end date: to be determined    Indication for Treatment: unknown source of infection (possible superimposed bacterial pneumonia with COVID)      Pertinent history per medical record: 85 y.o. male, who is a resident over [facility], who was brought to the emergency department on 2020 with fever and need to be screened for coronavirus.  No chest pain, no shortness of breath, no headache, no abdominal pain.  Actually patient has no complaints whatsoever.  He is blind in is currently undergoing recent cataract surgery.  He is also hard of hearing. Source is Covid pneumonia.  Unable to exclude underlying superimposed bacterial pneumonia at this time, as he also has left midlung infiltrates.     Other antibiotics: Zosyn 3.375 mg iv q8hr extended infusion     Allergies: Patient has no known allergies.      List concerns for renal function: age, renal function, BUN/SCr ratio >20:1, SIRS, nephrotoxic agents (Vanc + Zosyn)     Pertinent cultures to date:    - blood x 2: preliminary NEG  12/10 - blood x 2: Preliminary 1 of 2 possible Staph (not MRSA)    MRSA nares swab if pneumonia is a concern (ordered/positive/negative/n-a): ordered    Recent Labs     20  0133   WBC 17.5* 13.8*   NEUTSPOLYS 86.90* 77.00*   BANDSSTABS  --  14.00*     Recent Labs     20  0133   BUN 39* 46*   CREATININE 1.06 1.23   ALBUMIN 2.7* 2.1*     No results for input(s): VANCOTROUGH, VANCOPEAK, VANCORANDOM in the last 72 hours.    Intake/Output Summary (Last 24 hours) at 2020 0834  Last data filed at 2020 0600  Gross per 24 hour   Intake 2151.98 ml   Output --   Net 2151.98 ml      BP (!) 86/62   Pulse 85   Temp 37.8 °C (100 °F) (Temporal)   Resp (!) 33   Ht 1.575 m (5' 2\")   Wt 60.3 kg (132 lb 15 oz)   SpO2 97%  Temp (24hrs), Av.5 °C (99.5 °F), " Min:36.4 °C (97.6 °F), Max:39 °C (102.2 °F)      A/P   1. Vancomycin dose change: 1000 mg IV Q24H  2. Next vancomycin level: 12/13 1130  3. Goal trough: 15 - 20 mcg/ml  4. Comments: Plan to check trough prior to 3rd dose and adjust if needed per protocol.  Pharmacy to recommend de-escalation of antibiotics when appropriate.  MRSA nasal swab needs to be collected.    ADDI BurciagaD

## 2020-12-12 NOTE — PROGRESS NOTES
9:25pm Called about patient tachycardia to the 160s, temperature 102.2, got Tylenol.  SPO2 now 92% on nonrebreather, BP was 132/104, now 119/90.  I ordered EKG as well as adenosine to see what patient's underlying rhythm is.  Also checking BNP to decide whether to give patient fluids.  9:33: Heart rate decreased to 120 so will trial IV metoprolol instead.  Lactic acid returned at 8.5 so will transfer to ICU.  Patient is already on Vanco Zosyn and did have positive blood culture.  Likely sepsis has worsened.  Will gently give IV fluids given lactic acidosis but this risks causing pulmonary edema so will not bolus.  Patient is noted to be DNR.  Patient likely has very poor prognosis if pulmonary edema worsens.

## 2020-12-12 NOTE — CARE PLAN
Problem: Nutritional:  Goal: Achieve adequate nutritional intake  Description: Patient will consume >50% of meals  Outcome: PROGRESSING SLOWER THAN EXPECTED    Limited PO intake information for review. <25% breakfast/<25% Boost this am per ADL documentation. Continue Boost Plus with meals. RD following.

## 2020-12-12 NOTE — ASSESSMENT & PLAN NOTE
This is Sepsis Present on admission  SIRS criteria identified on my evaluation include: Fever, with temperature greater than 101 deg F, Tachycardia, with heart rate greater than 90 BPM and Leukocytosis, with WBC greater than 12,000  Source is Lungs - covid pna  Sepsis protocol initiated  Fluid resuscitation ordered per protocol - no as COVID pna  While organ dysfunction may be noted elsewhere in this problem list or in the chart, degree of organ dysfunction does not meet CMS criteria for severe sepsis  - 12/12: Admitted to ICU with elevated lactic acid and increasing oxygen requirement. On 8L. Doing well. Started on broad spectrum abx. BC positive for CoNS - likely contaminant. Will repeat cultures.   - 12/13: Repeated cultures. Will discontinue vancomycin.   - 12/14 no signs of secondary infection so all Abx stopped

## 2020-12-13 NOTE — FLOWSHEET NOTE
12/13/20 1333   Vital Signs   Pulse 88   Respiration (!) 36   Pulse Oximetry 92 %   $ Pulse Oximetry (Spot Check) Yes   Oxygen   O2 (LPM) 60   FiO2% 100 %   O2 Delivery Device Heated High Flow Nasal Cannula   Aerosols   $ Aerosol Delivery Device Heated High Flow Nasal Cannula   Aerosol Temperature 31 °C (87.8 °F)

## 2020-12-13 NOTE — FLOWSHEET NOTE
12/13/20 0624   Vital Signs   Pulse (!) 107   Respiration (!) 30   Pulse Oximetry 92 %   $ Pulse Oximetry (Spot Check) Yes   Respiratory Assessment   Level of Consciousness Alert   Oxygen   O2 (LPM) 60   FiO2% 100 %   O2 Delivery Device Heated High Flow Nasal Cannula   Aerosols   $ Aerosol Delivery Device Heated High Flow Nasal Cannula   Aerosol Temperature 31 °C (87.8 °F)

## 2020-12-13 NOTE — FLOWSHEET NOTE
12/12/20 2249   Vital Signs   Pulse (!) 114   Respiration (!) 35   Pulse Oximetry (!) 83 %   $ Pulse Oximetry (Spot Check) Yes   Chest Exam   Work Of Breathing / Effort Shallow;Tachypnea   Breath Sounds   RUL Breath Sounds Diminished   RML Breath Sounds Diminished   RLL Breath Sounds Diminished   SANTY Breath Sounds Diminished   LLL Breath Sounds Diminished   Oxygen   O2 (LPM) 50   FiO2% 100 %   O2 Delivery Device High Flow Nasal Cannula   Aerosols   $ Aerosol Delivery Device Heated High Flow Nasal Cannula   Aerosol Temperature 31 °C (87.8 °F)

## 2020-12-13 NOTE — FLOWSHEET NOTE
12/12/20 1921   Vital Signs   Pulse 85   Respiration (!) 32   Pulse Oximetry 90 %   $ Pulse Oximetry (Spot Check) Yes   Breath Sounds   RUL Breath Sounds Diminished   RML Breath Sounds Diminished   RLL Breath Sounds Diminished   SANTY Breath Sounds Diminished   LLL Breath Sounds Diminished   Oxygen   O2 (LPM) 40   FiO2% 95 %   O2 Delivery Device High Flow Nasal Cannula   Aerosols   $ Aerosol Delivery Device Heated High Flow Nasal Cannula   Aerosol Temperature 31 °C (87.8 °F)

## 2020-12-14 NOTE — PROGRESS NOTES
Critical Care Progress Note    Date of admission  12/4/2020    Chief Complaint  85 y.o. male who presented 12/4/2020 with high lactic acid and increasing hypoxic respiratory failure.known dementia who is DNR/DNI presented with fevers. Admitted to the floor and was started on dexamethasone after he was diagnosed with COVID-19. Last night patient became tachycardic and hypoxic. He was started on broad spectrum abx and transferred here.     Hospital Course  12/4: Admitted with COVID-19.   12/12: Became hypoxic and tachycardic. Transferred to ICU. 8L via NC initially and escalated to 60L 100%. Treated with broad spectrum abx. Started Remdesivir.   12/13: Hypernatremic with free water deficit calculated 3.0L, stable SpO2 on 100% 60L.  Stopped vancomycin.   12/14: no obvious secondary infections..    Interval Problem Update  Reviewed last 24 hour events:  Neuro: A&O x1. Baseline.   CVS: Stable.On midodrine.   Pulm: 60L 90%. Stable SpO2 of 85-86%  . Stopped vanc as MRSA nares negative 12/12/20.    H/Onc: WBC normal  I/Os: Incontinent so I/Os not accurate. Creatinine worsening but clearance still > 30 so ok to continue remdesivir      Review of Systems  ROS   Pt unable to give a review of systems non verbal    Vital Signs for last 24 hours   Temp:  [37.8 °C (100 °F)] 37.8 °C (100 °F)  Pulse:  [] 80  Resp:  [17-37] 26  BP: ()/(55-90) 133/78  SpO2:  [70 %-93 %] 89 %     Physical Exam   Physical Exam  HENT:      Head: Normocephalic and atraumatic.      Nose: Nose normal.      Mouth/Throat:      Mouth: Mucous membranes are dry.   Eyes:      Pupils: Pupils are equal, round, and reactive to light.   Neck:      Musculoskeletal: Normal range of motion.   Cardiovascular:      Rate and Rhythm: Normal rate.      Pulses: Normal pulses.   Pulmonary:      Effort: Pulmonary effort is normal.      Breath sounds: Rhonchi present.   Abdominal:      General: Abdomen is flat.      Palpations: Abdomen is soft.   Skin:     Capillary  Refill: Capillary refill takes less than 2 seconds.   Neurological:      Mental Status: He is alert.         Medications  Current Facility-Administered Medications   Medication Dose Route Frequency Provider Last Rate Last Admin   • D5 1/2 NS infusion   Intravenous Continuous Omer Tomlin M.D. 75 mL/hr at 12/14/20 1319 New Bag at 12/14/20 1319   • remdesivir (Veklury) 100 mg in  mL IVPB  100 mg Intravenous DAILY AT 1800 Omer Tomlin M.D.   Stopped at 12/13/20 1800   • chlorproMAZINE (THORAZINE) tablet 25 mg  25 mg Oral TID PRN Jose L Perez D.O.       • midodrine (PROAMATINE) tablet 7.5 mg  7.5 mg Oral TID WITH MEALS H Josefina Lewis M.D.   7.5 mg at 12/14/20 1011   • enoxaparin (LOVENOX) inj 30 mg  30 mg Subcutaneous DAILY H Josefina Lewis M.D.   30 mg at 12/14/20 0628   • ARIPiprazole (Abilify) tablet 2 mg  2 mg Oral DAILY Trish Alda M.D.   2 mg at 12/14/20 0710   • aspirin EC (ECOTRIN) tablet 81 mg  81 mg Oral DAILY Trish Alda M.D.   81 mg at 12/14/20 0627   • donepezil (ARICEPT) tablet 10 mg  10 mg Oral Nightly Trish Maribel-Modesto M.D.   10 mg at 12/13/20 2045   • escitalopram (Lexapro) tablet 20 mg  20 mg Oral DAILY Trish Maribel-Paula, M.D.   20 mg at 12/14/20 0627   • memantine (Namenda) tablet 10 mg  10 mg Oral DAILY Trish Maribel-Paula, M.D.   10 mg at 12/14/20 0627   • montelukast (SINGULAIR) tablet 10 mg  10 mg Oral DAILY Trish Maribel-Lista, M.D.   10 mg at 12/14/20 0627   • senna-docusate (PERICOLACE or SENOKOT S) 8.6-50 MG per tablet 2 Tab  2 Tab Oral BID Trish Maribel-Lista, M.D.   Stopped at 12/14/20 0600    And   • polyethylene glycol/lytes (MIRALAX) PACKET 1 Packet  1 Packet Oral QDAY PRN Trish Lizama M.D.        And   • magnesium hydroxide (MILK OF MAGNESIA) suspension 30 mL  30 mL Oral QDAY PRN Trish Lizama M.D.        And   • bisacodyl (DULCOLAX) suppository 10 mg  10 mg Rectal QDAY PRN Trish  XAVIER Lizama       • acetaminophen (Tylenol) tablet 650 mg  650 mg Oral Q6HRS PRN Trish Lizama M.D.   650 mg at 12/14/20 0254   • guaiFENesin dextromethorphan (ROBITUSSIN DM) 100-10 MG/5ML syrup 10 mL  10 mL Oral Q6HRS PRN Trish Lizama M.D.       • ondansetron (ZOFRAN) syringe/vial injection 4 mg  4 mg Intravenous Q4HRS PRN Trish Lizama M.D.       • ondansetron (ZOFRAN ODT) dispertab 4 mg  4 mg Oral Q4HRS PRN Trish Lizama M.D.           Fluids    Intake/Output Summary (Last 24 hours) at 12/14/2020 1619  Last data filed at 12/14/2020 1027  Gross per 24 hour   Intake 1542.5 ml   Output --   Net 1542.5 ml       Laboratory  Recent Labs     12/12/20  0336   ISTATAPH 7.420   ISTATAPCO2 48.2*   ISTATAPO2 104*   ISTATATCO2 33   OMYULZV6THT 98   ISTATARTHCO3 31.2*   ISTATARTBE 6*   ISTATTEMP see below   ISTATFIO2 85   ISTATSPEC Arterial         Recent Labs     12/12/20 0133 12/13/20  0450 12/14/20  0510   SODIUM 149* 156* 157*   POTASSIUM 4.1 4.2 3.5*   CHLORIDE 118* 124* 126*   CO2 21 22 20   BUN 46* 43* 43*   CREATININE 1.23 1.37 1.47*   CALCIUM 8.0* 8.3* 8.1*     Recent Labs     12/12/20 0133 12/13/20  0450 12/14/20  0510   ALTSGPT 44 38 37   ASTSGOT 53* 70* 84*   ALKPHOSPHAT 70 109* 91   TBILIRUBIN 1.2 1.2 0.9   GLUCOSE 115* 97 120*     Recent Labs     12/12/20 0133 12/13/20  0450 12/14/20  0510   WBC 13.8*  --  7.8   NEUTSPOLYS 77.00*  --  72.00   LYMPHOCYTES 1.00*  --  5.00*   MONOCYTES 2.00  --  2.00   EOSINOPHILS 1.00  --  1.00   BASOPHILS 1.00  --  1.00   ASTSGOT 53* 70* 84*   ALTSGPT 44 38 37   ALKPHOSPHAT 70 109* 91   TBILIRUBIN 1.2 1.2 0.9     Recent Labs     12/12/20  0133 12/14/20  0510   RBC 3.66* 2.82*   HEMOGLOBIN 12.4* 9.8*   HEMATOCRIT 37.4* 30.3*   PLATELETCT 185 199       Imaging  X-Ray:  I have personally reviewed the images and compared with prior images.    Assessment/Plan  * Pneumonia due to COVID-19 virus  Assessment & Plan  - Positive COVID-19  test. Started on dexamethasone 6 mg daily.  - Was not considered for Remdesivir for possibly reduced oxygen requirements.   - Started Remdesivir. Discussed the indications and EUA by FDA with the wife  - monitor creatinine clearance     Respiratory failure ([facility])  Assessment & Plan  - Hypoxic resp failure worsening.   - CXR consistent with worsening bilateral opacities - secondary to covid pna  - no obvious signs of secondary infection    PAULINA (acute kidney injury) ([facility])  Assessment & Plan  Likely in the setting of hypotension from sepsis.  Will monitor renal functions and urine OP closely.    Dehydration too and on fluids Na was 148    Sepsis ([facility])  Assessment & Plan  This is Sepsis Present on admission  SIRS criteria identified on my evaluation include: Fever, with temperature greater than 101 deg F, Tachycardia, with heart rate greater than 90 BPM and Leukocytosis, with WBC greater than 12,000  Source is Lungs - covid pna  Sepsis protocol initiated  Fluid resuscitation ordered per protocol - no as COVID pna  While organ dysfunction may be noted elsewhere in this problem list or in the chart, degree of organ dysfunction does not meet CMS criteria for severe sepsis  - 12/12: Admitted to ICU with elevated lactic acid and increasing oxygen requirement. On 8L. Doing well. Started on broad spectrum abx. BC positive for CoNS - likely contaminant. Will repeat cultures.   - 12/13: Repeated cultures. Will discontinue vancomycin.   - 12/14 no signs of secondary infection so all Abx stopped          Hypernatremia- (present on admission)  Assessment & Plan  - Likely related to insensible losses.   - Free water deficit (Weight 60kg; Na 148 and desired sodium of 140) calculated to be ~2L. Will increase oral water intake. Discussed with nursing. Will also add D5-1/2 NS if oral intake is inadequate.     Late onset Alzheimer's disease with behavioral disturbance (HCC)- (present on admission)  Assessment & Plan  - He is on donezepil,  escitalopram, Namenda   - Continue current home medication.  Higher risk for agitation and delirium.       Due to late alzeihmers and high Fio2 requirement and progression of COVID pna - requesting palliative care consult.  Prognosis very guarded  Due to severity of Alzeihmers not a candidate for BIPAP as would not tolerate and BIPAP is interim for intubation    VTE:  Lovenox  Ulcer: H2 Antagonist  Lines: None    I have performed a physical exam and reviewed and updated ROS and Plan today (12/14/2020). In review of yesterday's note (12/13/2020), there are no changes except as documented above.

## 2020-12-14 NOTE — PROGRESS NOTES
Report received from MULUGETA Lima. Patient transferred from ICU to [facility] via bed accompanied by RT, CNA and RN. Patient oriented to room. Call bell within reach. Bilateral Wrist Restraints on. Patient clean and dry. This RN agrees with previous RN's assessment Vitals taken, Patient on 60L/100% O2. Patient A&O X2. Bed in lowest position with wheels locked. Patient denies needs currently.

## 2020-12-14 NOTE — ASSESSMENT & PLAN NOTE
- Likely related to insensible losses.   - Free water deficit (Weight 60kg; Na 148 and desired sodium of 140) calculated to be ~2L. Will increase oral water intake. Discussed with nursing. Will also add D5-1/2 NS if oral intake is inadequate.

## 2020-12-14 NOTE — PROGRESS NOTES
Critical Care Progress Note    Date of admission  12/4/2020    Chief Complaint  85 y.o. male who presented 12/4/2020 with high lactic acid and increasing hypoxic respiratory failure. Patient is not able to give much history. I called his wife and obtained some history and also discussed with patients medical and nursing staff.  84 yo male with known dementia who is DNR/DNI presented with fevers. Admitted to the floor and was started on dexamethasone after he was diagnosed with COVID-19. Last night patient became tachycardic and hypoxic. He was started on broad spectrum abx and transferred here.     Hospital Course  12/4: Admitted with COVID-19.   12/12: Became hypoxic and tachycardic. Transferred to ICU. 8L via NC initially and escalated to 60L 100%. Treated with broad spectrum abx. Started Remdesivir.   12/13: Hypernatremic with free water deficit calculated 3.0L, stable SpO2 on 100% 60L.  Stopped vancomycin.     Interval Problem Update  Reviewed last 24 hour events:  Neuro: A&O x1. Baseline.   CVS: Stable.On midodrine.   Pulm: 60L 100%. Stable SpO2. Raised D dimers/procalcitonin on admission. On Zosyn. Stopped vanc as MRSA nares negative 12/12/20.    H/Onc: WBC improving.   I/Os: Incontinent so I/Os not accurate.     Review of Systems  ROS     Vital Signs for last 24 hours   Temp:  [37.2 °C (98.9 °F)-39.2 °C (102.6 °F)] 38.2 °C (100.7 °F)  Pulse:  [] 92  Resp:  [20-39] 31  BP: ()/(57-91) 106/73  SpO2:  [79 %-97 %] 94 %    Hemodynamic parameters for last 24 hours       Respiratory Information for the last 24 hours       Physical Exam   Physical Exam  HENT:      Head: Normocephalic and atraumatic.      Nose: Nose normal.      Mouth/Throat:      Mouth: Mucous membranes are dry.   Eyes:      Pupils: Pupils are equal, round, and reactive to light.   Neck:      Musculoskeletal: Normal range of motion.   Cardiovascular:      Rate and Rhythm: Normal rate.      Pulses: Normal pulses.   Pulmonary:      Effort:  Pulmonary effort is normal.      Breath sounds: Wheezing present.   Abdominal:      General: Abdomen is flat.      Palpations: Abdomen is soft.   Skin:     Capillary Refill: Capillary refill takes less than 2 seconds.   Neurological:      Mental Status: He is alert.         Medications  Current Facility-Administered Medications   Medication Dose Route Frequency Provider Last Rate Last Admin   • remdesivir (Veklury) 100 mg in  mL IVPB  100 mg Intravenous DAILY AT 1800 Omer Tomlin M.D.   Stopped at 12/13/20 1800   • chlorproMAZINE (THORAZINE) tablet 25 mg  25 mg Oral TID PRN Jose L Perez D.O.       • piperacillin-tazobactam (ZOSYN) 3.375 g in  mL IVPB  3.375 g Intravenous Q8HRS H Josefina Lewis M.D.   Stopped at 12/13/20 1842   • Metoprolol Tartrate (LOPRESSOR) injection 5 mg  5 mg Intravenous Q5 MIN PRN Jayme Landaverde M.D.   5 mg at 12/12/20 2253   • NS infusion 1,000 mL  1,000 mL Intravenous Continuous Jayme Landaverde M.D.   Stopped at 12/12/20 0749   • midodrine (PROAMATINE) tablet 7.5 mg  7.5 mg Oral TID WITH MEALS H Josefina Lewis M.D.   7.5 mg at 12/13/20 1730   • enoxaparin (LOVENOX) inj 30 mg  30 mg Subcutaneous DAILY H Josefina Lewis M.D.   30 mg at 12/13/20 0604   • ARIPiprazole (Abilify) tablet 2 mg  2 mg Oral DAILY Trish Lizama M.D.   2 mg at 12/13/20 0600   • aspirin EC (ECOTRIN) tablet 81 mg  81 mg Oral DAILY Trish Lizama M.D.   81 mg at 12/13/20 0604   • donepezil (ARICEPT) tablet 10 mg  10 mg Oral Nightly CIPRIANO TranDJerri   10 mg at 12/12/20 2055   • escitalopram (Lexapro) tablet 20 mg  20 mg Oral DAILY Trish Lizama M.D.   20 mg at 12/13/20 0604   • memantine (Namenda) tablet 10 mg  10 mg Oral DAILY Trish Lizama M.D.   10 mg at 12/13/20 0604   • montelukast (SINGULAIR) tablet 10 mg  10 mg Oral DAILY Trish Lizama M.D.   10 mg at 12/13/20 0604   • senna-docusate (PERICOLACE or SENOKOT S) 8.6-50 MG per tablet 2 Tab  2  Tab Oral BID Trish Lizama M.D.   2 Tab at 12/13/20 1800    And   • polyethylene glycol/lytes (MIRALAX) PACKET 1 Packet  1 Packet Oral QDAY PRN Trish Lizama M.D.        And   • magnesium hydroxide (MILK OF MAGNESIA) suspension 30 mL  30 mL Oral QDAY PRN Trish Lizama M.D.        And   • bisacodyl (DULCOLAX) suppository 10 mg  10 mg Rectal QDAY PRN Trish Lizama M.D.       • acetaminophen (Tylenol) tablet 650 mg  650 mg Oral Q6HRS PRN Trish Lizama M.D.   650 mg at 12/12/20 2226   • guaiFENesin dextromethorphan (ROBITUSSIN DM) 100-10 MG/5ML syrup 10 mL  10 mL Oral Q6HRS PRN Trish Lizama M.D.       • ondansetron (ZOFRAN) syringe/vial injection 4 mg  4 mg Intravenous Q4HRS PRN Trish Lizama M.D.       • ondansetron (ZOFRAN ODT) dispertab 4 mg  4 mg Oral Q4HRS PRN Trish Lizama M.D.       • dexamethasone (DECADRON) injection 6 mg  6 mg Intravenous DAILY Trish Lizama M.D.   6 mg at 12/13/20 0604       Fluids    Intake/Output Summary (Last 24 hours) at 12/13/2020 1803  Last data filed at 12/13/2020 1500  Gross per 24 hour   Intake 302.5 ml   Output --   Net 302.5 ml       Laboratory  Recent Labs     12/12/20  0336   ISTATAPH 7.420   ISTATAPCO2 48.2*   ISTATAPO2 104*   ISTATATCO2 33   DLPBSCJ2FPG 98   ISTATARTHCO3 31.2*   ISTATARTBE 6*   ISTATTEMP see below   ISTATFIO2 85   ISTATSPEC Arterial         Recent Labs     12/11/20  0229 12/12/20  0133 12/13/20  0450   SODIUM 142 149* 156*   POTASSIUM 4.3 4.1 4.2   CHLORIDE 108 118* 124*   CO2 20 21 22   BUN 39* 46* 43*   CREATININE 1.06 1.23 1.37   CALCIUM 8.9 8.0* 8.3*     Recent Labs     12/11/20 0229 12/12/20 0133 12/13/20  0450   ALTSGPT 71* 44 38   ASTSGOT 96* 53* 70*   ALKPHOSPHAT 80 70 109*   TBILIRUBIN 1.7* 1.2 1.2   GLUCOSE 115* 115* 97     Recent Labs     12/11/20 0229 12/12/20 0133 12/13/20 0450   WBC 17.5* 13.8*  --    NEUTSPOLYS 86.90* 77.00*  --    LYMPHOCYTES 4.30*  1.00*  --    MONOCYTES 2.90 2.00  --    EOSINOPHILS 0.00 1.00  --    BASOPHILS 0.80 1.00  --    ASTSGOT 96* 53* 70*   ALTSGPT 71* 44 38   ALKPHOSPHAT 80 70 109*   TBILIRUBIN 1.7* 1.2 1.2     Recent Labs     12/11/20  0229 12/12/20  0133   RBC 4.46* 3.66*   HEMOGLOBIN 14.6 12.4*   HEMATOCRIT 46.9 37.4*   PLATELETCT 196 185       Imaging  X-Ray:  I have personally reviewed the images and compared with prior images.    Assessment/Plan  * Pneumonia due to COVID-19 virus  Assessment & Plan  - Positive COVID-19 test. Started on dexamethasone 6 mg daily.  - Was not considered for Remdesivir for possibly reduced oxygen requirements.   - Will consider initiating Remdesivir. Discussed the indications and EUA by FDA with the wife      PAULINA (acute kidney injury) (HCC)  Assessment & Plan  Likely in the setting of hypotension from sepsis.  Will monitor renal functions and urine OP closely.      Respiratory failure (HCC)  Assessment & Plan  - Hypoxic resp failure worsening.   - CXR consistent with worsening bilateral opacities - could be secondary to worsening resp failure sec to COVID or superadded bacterial pna.   - Covered with broad spectrum abx. Will check for NP swab for MRSA - if negative we will discontinue Vancomycin and continue Zosyn for 7 days.     Sepsis (HCC)  Assessment & Plan  This is Sepsis Present on admission  SIRS criteria identified on my evaluation include: Fever, with temperature greater than 101 deg F, Tachycardia, with heart rate greater than 90 BPM and Leukocytosis, with WBC greater than 12,000  Source is Lungs  Sepsis protocol initiated  Fluid resuscitation ordered per protocol  IV antibiotics as appropriate for source of sepsis  While organ dysfunction may be noted elsewhere in this problem list or in the chart, degree of organ dysfunction does not meet CMS criteria for severe sepsis  - 12/12: Admitted to ICU with elevated lactic acid and increasing oxygen requirement. On 8L. Doing well. Started on broad  spectrum abx. BC positive for CoNS - likely contaminant. Will repeat cultures.   - 12/13: Repeated cultures. Will discontinue vancomycin.           Hypernatremia- (present on admission)  Assessment & Plan  - Likely related to insensible losses.   - Free water deficit (Weight 60kg; Na 156 and desired sodium of 140) calculated to be ~3L. Will increase oral water intake. Discussed with nursing. Will also add D5-1/2 NS if oral intake is inadequate.     Late onset Alzheimer's disease with behavioral disturbance (HCC)- (present on admission)  Assessment & Plan  - He is on donezepil, escitalopram, Namenda, no repeat resolved.    - Continue current home medication.  Higher risk for agitation and delirium.         VTE:  Lovenox  Ulcer: H2 Antagonist  Lines: None    I have performed a physical exam and reviewed and updated ROS and Plan today (12/13/2020). In review of yesterday's note (12/12/2020), there are no changes except as documented above.     Discussed patient condition and risk of morbidity and/or mortality with Hospitalist, Family, RN and RT  The patient remains critically ill.  Critical care time = 54 minutes in directly providing and coordinating critical care and extensive data review.  No time overlap and excludes procedures.

## 2020-12-15 NOTE — FLOWSHEET NOTE
12/15/20 0918   Events/Summary/Plan   Events/Summary/Plan Patient now comfort care.  Oxygen removed.

## 2020-12-15 NOTE — PROGRESS NOTES
Received bedside patient report from MULUGETA Ortega. Patient resting comfortably in bed, no complaints at this time. Safety precautions in place. Will continue to monitor.

## 2020-12-15 NOTE — PROGRESS NOTES
Patient passed away at 1450. Time of Death called by MULUGETA Ortega and MULUGETA Varghese. MD Ratliff notified at 1455. MD Ratliff notified Wife.  Notified at 1505. [facility] notified at 1523.

## 2020-12-15 NOTE — DISCHARGE SUMMARY
Death Summary    Cause of Death  Cardiopulmonary arrest due to acute hypoxic respiratory failure due to severe sepsis  due to Covid Pneumonia.    Comorbid Conditions at the Time of Death  Principal Problem:    Pneumonia due to COVID-19 virus POA: Unknown  Active Problems:    Respiratory failure (HCC) POA: Unknown    Sepsis (HCC) POA: Unknown    PAULINA (acute kidney injury) (HCC) POA: Unknown    Hypotension POA: Unknown    Late onset Alzheimer's disease with behavioral disturbance (HCC) POA: Yes    Hypernatremia POA: Yes    Vision loss of left eye POA: Yes  Resolved Problems:    * No resolved hospital problems. *      History of Presenting Illness and Hospital Course  85 y.o. male who presented 12/4/2020 with high lactic acid and increasing hypoxic respiratory failure.known dementia who is DNR/DNI presented with fevers. Admitted to the floor and was started on dexamethasone after he was diagnosed with COVID-19.  On 12/12 patient hypoxic and tachycardic and was transferred to ICU and his oxygenation was escalated and he was placed on 60 L of 100% oxygen via high flow nasal cannula.  Over the next 3 days patient consistently near 60 L of oxygen via high flow nasal cannula and he demonstrated tachycardia tachypnea and air hunger.  Patient family agreed for comfort care patient was treated with IV morphine and Ativan for anxiety and dyspnea.  Today at 2.59 patient found without any pulse or blood pressure.  Patient was evaluated by 2 RNs as per hospital policy and was pronounced dead at 2.5 9 PM on 12/15/2020.

## 2020-12-15 NOTE — PROGRESS NOTES
Telemetry Shift Summary     Rhythm SR  HR Range 81 - 94  Ectopy R PVC; R TRIGEM  Measurements 0.20 / 0.08 / 0.32           Normal Values  Rhythm SR  HR Range    Measurements 0.12-0.20 / 0.06-0.10  / 0.30-0.52

## 2020-12-15 NOTE — CARE PLAN
Problem: Nutritional:  Goal: Achieve adequate nutritional intake  Description: Patient will consume >50% of meals  Outcome: DISCHARGED-GOAL NOT MET     Pt is now comfort care. Goal is for pt to be comfortable. RD will follow PRN.

## 2020-12-15 NOTE — FLOWSHEET NOTE
12/15/20 0634   Vital Signs   Pulse (!) 104   Respiration (!) 34   Pulse Oximetry 89 %   $ Pulse Oximetry (Spot Check) Yes   Chest Exam   Work Of Breathing / Effort Mild;Moderate   Oxygen   O2 (LPM) 60   FiO2% 100 %   O2 Delivery Device Heated High Flow Nasal Cannula   Aerosols   $ Aerosol Delivery Device Heated High Flow Nasal Cannula   Aerosol Temperature 30 °C (86 °F)

## 2020-12-15 NOTE — PROGRESS NOTES
Patient desated to 59%. Maxed out on High flow at 60L/100%. Non-rebreathe at 15L applied. RT aware. . MD Lara aware. MD Lara to call wife (See Notes). Patient back to 87%. Report given to MULUGETA Diaz and he is aware of currently events. Continuous pulse ox on. Patient was in no acute distress when this RN left room.

## 2020-12-15 NOTE — FLOWSHEET NOTE
12/15/20 0741   Events/Summary/Plan   Events/Summary/Plan Went to check back on patient.  He is back on NRB and HFNC.   Vital Signs   Pulse Oximetry   (86-91%)   Oxygen   O2 (LPM) 60   FiO2% 100 %   O2 Delivery Device Heated High Flow Nasal Cannula;Non-Rebreather Mask

## 2020-12-15 NOTE — PROGRESS NOTES
Pulmonary follow up    Called Brenda to let her know that Quan fio2 requirement is up and needing a NRB  She has stated that if he worsens or we cant keep his michael up or he is struggling for breath despite the support to transition to comfort  She is aware that he may pass tonight

## 2021-02-17 NOTE — PROGRESS NOTES
Received bedside report from day shift RN. Pt is alert and oriented x3, disoriented to time. Denies pain at this time. Compliant with meds. Bed alarm on, call light within reach, bed in lowest position, and treaded slipper socks on pt.      Additional Progress Note...
